# Patient Record
Sex: FEMALE | Race: WHITE | Employment: FULL TIME | URBAN - METROPOLITAN AREA
[De-identification: names, ages, dates, MRNs, and addresses within clinical notes are randomized per-mention and may not be internally consistent; named-entity substitution may affect disease eponyms.]

---

## 2017-01-03 ENCOUNTER — OFFICE VISIT (OUTPATIENT)
Dept: INTERNAL MEDICINE CLINIC | Age: 52
End: 2017-01-03

## 2017-01-03 VITALS
HEIGHT: 67 IN | DIASTOLIC BLOOD PRESSURE: 98 MMHG | BODY MASS INDEX: 35.06 KG/M2 | OXYGEN SATURATION: 99 % | WEIGHT: 223.4 LBS | RESPIRATION RATE: 20 BRPM | HEART RATE: 98 BPM | SYSTOLIC BLOOD PRESSURE: 132 MMHG

## 2017-01-03 DIAGNOSIS — R23.2 HOT FLASHES: ICD-10-CM

## 2017-01-03 DIAGNOSIS — F98.8 ADD (ATTENTION DEFICIT DISORDER): ICD-10-CM

## 2017-01-03 DIAGNOSIS — R13.11 ORAL PHASE DYSPHAGIA: ICD-10-CM

## 2017-01-03 DIAGNOSIS — J40 BRONCHITIS: ICD-10-CM

## 2017-01-03 DIAGNOSIS — J02.9 SORE THROAT: Primary | ICD-10-CM

## 2017-01-03 DIAGNOSIS — E03.9 ACQUIRED HYPOTHYROIDISM: ICD-10-CM

## 2017-01-03 DIAGNOSIS — F41.8 DEPRESSION WITH ANXIETY: ICD-10-CM

## 2017-01-03 RX ORDER — VENLAFAXINE 37.5 MG/1
37.5 TABLET ORAL 2 TIMES DAILY
Qty: 60 TAB | Refills: 0 | Status: SHIPPED | OUTPATIENT
Start: 2017-01-03 | End: 2017-01-26 | Stop reason: SDUPTHER

## 2017-01-03 RX ORDER — AZITHROMYCIN 250 MG/1
250 TABLET, FILM COATED ORAL SEE ADMIN INSTRUCTIONS
Qty: 6 TAB | Refills: 0 | Status: SHIPPED | OUTPATIENT
Start: 2017-01-03 | End: 2017-01-08

## 2017-01-03 NOTE — PROGRESS NOTES
1. Have you been to the ER, urgent care clinic since your last visit? Hospitalized since your last visit?no    2. Have you seen or consulted any other health care providers outside of the [facility] since your last visit? Include any pap smears or colon screening. No  Chief Complaint   Patient presents with    Sore Throat     sore throat x4 days with some nasal congestion and sinus pressure.

## 2017-01-03 NOTE — MR AVS SNAPSHOT
Visit Information Date & Time Provider Department Dept. Phone Encounter #  
 1/3/2017 11:30 AM Marcela Prescott, [address] [facility],[address] Internal Medicine [phone] [id number] Your Appointments 1/10/2017 10:00 AM  
New Patient with Juan Luis John PsyD Neurology [address] [place] [address]) Appt Note: NP. ..consult. ... ADD. ..lws; May be RTA eligible.; NP. ..consult. ... ADD. Denise Hurtado [facility]; NP. ..consult. .. Denise Hurtado ADD/ ajn; . [place] [address] [facility] [address] [place] [id number] [phone]  
  
   
 [place] [address] [facility] [address] [id number] [address] [place] Upcoming Health Maintenance Date Due DTaP/Tdap/Td series (1 - Tdap) 3/7/1988 PAP AKA CERVICAL CYTOLOGY 3/7/1988 Allergies as of 1/3/2017  Review Complete On: 1/3/2017 By: Marcela Prescott MD  
  
 Severity Noted Reaction Type Reactions Tylenol-codeine #3 [Acetaminophen-codeine]  01/13/2016    Unknown (comments) Takes hydrocodone at home. Has used oxycodone at well Current Immunizations  Never Reviewed No immunizations on file. Not reviewed this visit You Were Diagnosed With   
  
 Codes Comments Sore throat    -  Primary ICD-10-CM: J02.9 ICD-9-CM: 344 Oral phase dysphagia     ICD-10-CM: R13.11 ICD-9-CM: 787.21   
 ADD (attention deficit disorder)     ICD-10-CM: F98.8 ICD-9-CM: 314.00 Acquired hypothyroidism     ICD-10-CM: E03.9 ICD-9-CM: 244.9 Bronchitis     ICD-10-CM: J40 ICD-9-CM: 411 Hot flashes     ICD-10-CM: R23.2 ICD-9-CM: 782.62 Depression with anxiety     ICD-10-CM: F41.8 ICD-9-CM: 300.4 Vitals BP Pulse Resp Height(growth percentile) Weight(growth percentile) SpO2  
 (!) 132/98 (BP 1 Location: Left arm, BP Patient Position: Sitting) 98 20 5' 7\" (1.702 m) 223 lb 6.4 oz (101.3 kg) 99% BMI OB Status Smoking Status 34.99 kg/m2 Menopause Former Smoker BMI and BSA Data Body Mass Index Body Surface Area  34.99 kg/m 2 2.19 m 2  
  
 Preferred Pharmacy Pharmacy Name Phone [address] Your Updated Medication List  
  
   
This list is accurate as of: 1/3/17 12:34 PM.  Always use your most recent med list.  
  
  
  
  
 azithromycin 250 mg tablet Commonly known as:  Leanne Pickup Take 1 Tab by mouth See Admin Instructions for 5 days. dextroamphetamine-amphetamine 20 mg tablet Commonly known as:  ADDERALL Take 1 Tab by mouth two (2) times a day for 30 days. Max Daily Amount: 40 mg.  
  
 levothyroxine 75 mcg tablet Commonly known as:  SYNTHROID Take 1 Tab by mouth Daily (before breakfast). traMADol 50 mg tablet Commonly known as:  ULTRAM  
Take 100 mg by mouth every six (6) hours as needed for Pain. venlafaxine 37.5 mg tablet Commonly known as:  [facility] Take 1 Tab by mouth two (2) times a day for 30 days. Prescriptions Sent to Pharmacy Refills  
 azithromycin (ZITHROMAX) 250 mg tablet 0 Sig: Take 1 Tab by mouth See Admin Instructions for 5 days. Class: Normal  
 Pharmacy: [address] [place] [address] Ph #: [phone] Route: Oral  
 venlafaxine (EFFEXOR) 37.5 mg tablet 0 Sig: Take 1 Tab by mouth two (2) times a day for 30 days. Class: Normal  
 Pharmacy: [address] [place] [address] Ph #: [phone] Route: Oral  
  
Referral Information Referral ID Referred By Referred To  
  
 [id number] [facility] [address] [place] [address] [phone] [address] [place] [address] Visits Status Start Date End Date 1 New Request 1/3/17 1/3/18 If your referral has a status of pending review or denied, additional information will be sent to support the outcome of this decision. [facility]! Dear BODØ: Thank you for requesting a [employer] account. Our records indicate that you already have an active [employer] account. You can access your account anytime at [email or web address] Did you know that you can access your hospital and ER discharge instructions at any time in [employer]? You can also review all of your test results from your hospital stay or ER visit. Additional Information If you have questions, please visit the Frequently Asked Questions section of the [employer] website at [email or web address] Remember, [employer] is NOT to be used for urgent needs. For medical emergencies, dial 911. Now available from your iPhone and Android! Please provide this summary of care documentation to your next provider. Your primary care clinician is listed as Juan Bull. If you have any questions after today's visit, please call [phone].

## 2017-01-03 NOTE — PROGRESS NOTES
Written by Lorne Bueno, as dictated by Dr. Cary Corona MD.    Melvina Garcia is a 54 y.o. female. HPI  The patient comes in today C/O sore throat x 3 days. She has started coughing today and she has post nasal drip. She took mucinex x 2 days which has helped with her throat pain. She has been having trouble swallowing and she sometimes will vomit because the food will get stuck. This occurs 3-4 times a day. Her hair has been falling out and she has also been struggling with depression and not wanting to spend time with anyone. She has not had a period in 18 months and she has very bad hot flashes. She had her flu shot in November. 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 Max Daily Amount: 40 mg. 60 Tab 0    levothyroxine (SYNTHROID) 75 mcg tablet Take 1 Tab by mouth Daily (before breakfast). 90 Tab 1    traMADol (ULTRAM) 50 mg tablet Take 100 mg by mouth every six (6) hours as needed for Pain. No current facility-administered medications on file prior to visit. Allergies   Allergen Reactions    Tylenol-Codeine #3 [Acetaminophen-Codeine] Unknown (comments)     Takes hydrocodone at home.  Has used oxycodone at well       Past Medical History   Diagnosis Date    Arthritis     Colitis     [facility] [address] [address]     Ill-defined condition      ADHD    Ill-defined condition      Hep C    Thyroid disease     Ulcerative colitis ([facility] [address]        Past Surgical History   Procedure Laterality Date    Hx tonsillectomy  1988    Hx knee arthroscopy       x3- last done 9/2015 to harvest cartilage    Hx orthopaedic Left 1/14/2016     articular cartilage transplant       Family History   Problem Relation Age of Onset    Cancer Mother      melanoma    Hypertension Father     Cancer Father      melanoma    Arthritis-osteo Father     No Known Problems Sister        Social History     Social History    Marital status:      Spouse name: N/A    Number of children: N/A    Years of education: N/A     Occupational History    Not on file. Social History Main Topics    Smoking status: Former Smoker     Quit date: 11/25/2014    Smokeless tobacco: Current User      Comment: uses e cigarettes    Alcohol use No    Drug use: No    Sexual activity: No     Other Topics Concern    Not on file     Social History Narrative           Review of Systems   Constitutional: Negative for malaise/fatigue. HENT: Positive for sore throat. Negative for congestion. Respiratory: Positive for cough. Negative for wheezing. Cardiovascular: Negative for chest pain and palpitations. Musculoskeletal: Negative for joint pain and myalgias. Neurological: Negative for weakness and headaches. Psychiatric/Behavioral: Positive for depression. Negative for suicidal ideas. The patient is nervous/anxious. Visit Vitals    BP (!) 132/98 (BP 1 Location: Left arm, BP Patient Position: Sitting)    Pulse 98    Resp 20    Ht 5' 7\" (1.702 m)    Wt 223 lb 6.4 oz (101.3 kg)    SpO2 99%    BMI 34.99 kg/m2     Physical Exam   Constitutional: She is oriented to person, place, and time. She appears well-nourished. No distress. HENT:   Right Ear: External ear normal.   Left Ear: External ear normal.   Mouth/Throat: Posterior oropharyngeal edema present.    R ear erythema   Eyes: Conjunctivae and EOM are normal. Right eye exhibits no discharge. Left eye exhibits no discharge. Neck: Normal range of motion. Neck supple. Cardiovascular: Normal rate and regular rhythm. Pulmonary/Chest: Effort normal and breath sounds normal. She has no wheezes. L sided coarse breath sounds    Abdominal: Soft. Bowel sounds are normal. She exhibits no distension. Lymphadenopathy:     She has no cervical adenopathy. Neurological: She is alert and oriented to person, place, and time. Skin: Skin is intact. Psychiatric: She has a normal mood and affect. Nursing note and vitals reviewed. ASSESSMENT and PLAN    ICD-10-CM ICD-9-CM    1. Sore throat J02.9 462 I want her to do salt water gargles 4-5 times a day for 3-4 days. 2. Oral phase dysphagia R13.11 787.21 REFERRAL TO GASTROENTEROLOGY    I will send her to a gastroenterologist for an endoscopy. 3. ADD (attention deficit disorder) F98.8 314.00 She has an appointment for testing in February. 4. Acquired hypothyroidism E03.9 244.9 TSH 3RD GENERATION    I will check her levels today. LIPID PANEL   5. Bronchitis J40 490 azithromycin (ZITHROMAX) 250 mg tablet sent to pharmacy. I discussed that I will start her on Z-pack. 6. Hot flashes R23.2 782.62 venlafaxine (EFFEXOR) 37.5 mg tablet sent to pharmacy. 7. Depression with anxiety F41.8 300.4 venlafaxine (EFFEXOR) 37.5 mg tablet sent to pharmacy. I am going to start her on Effexor once she stops the Z-pack for hot flashes and I want to see her back in 4 weeks after she has been on the medication and this will help her with her anxiety as well. This plan was reviewed with the patient and patient agrees. All questions were answered. This scribe documentation was reviewed by me and accurately reflects the examination and decisions made by me. This note will not be viewable in [address]

## 2017-01-04 LAB
CHOLEST SERPL-MCNC: 185 MG/DL (ref 100–199)
HDLC SERPL-MCNC: 44 MG/DL
INTERPRETATION, 910389: NORMAL
LDLC SERPL CALC-MCNC: 118 MG/DL (ref 0–99)
TRIGL SERPL-MCNC: 113 MG/DL (ref 0–149)
TSH SERPL DL<=0.005 MIU/L-ACNC: 1.71 UIU/ML (ref 0.45–4.5)
VLDLC SERPL CALC-MCNC: 23 MG/DL (ref 5–40)

## 2017-01-04 NOTE — PROGRESS NOTES
Let her know TSH came back normal. Should continue same dose synthroid. LDL slightly elevated ,need exercise & low fat diet.

## 2017-01-15 DIAGNOSIS — F90.0 ATTENTION DEFICIT HYPERACTIVITY DISORDER (ADHD), PREDOMINANTLY INATTENTIVE TYPE: ICD-10-CM

## 2017-01-17 DIAGNOSIS — E03.9 ACQUIRED HYPOTHYROIDISM: ICD-10-CM

## 2017-01-17 RX ORDER — LEVOTHYROXINE SODIUM 75 UG/1
75 TABLET ORAL
Qty: 90 TAB | Refills: 1 | Status: SHIPPED | OUTPATIENT
Start: 2017-01-17 | End: 2017-11-30 | Stop reason: DRUGHIGH

## 2017-01-17 NOTE — TELEPHONE ENCOUNTER
From: Héctor Cardoza  To: Lauren Acevedo NP  Sent: 1/17/2017 10:22 AM EST  Subject: Medication Renewal Request    Original authorizing provider: PETER Laguna would like a refill of the following medications:  levothyroxine (SYNTHROID) 75 mcg tablet Lauren Acevedo NP]    Preferred pharmacy: [address] [place], [place] [address]    Comment:

## 2017-01-17 NOTE — TELEPHONE ENCOUNTER
From: Mechelle Genao  To: Rebecca Friend NP  Sent: 1/15/2017 2:11 PM EST  Subject: Medication Renewal Request    Original authorizing provider: PETER Perez would like a refill of the following medications:  dextroamphetamine-amphetamine (ADDERALL) 20 mg tablet Rebecca Freind NP]    Preferred pharmacy: [facility] [place]    Comment:

## 2017-01-18 RX ORDER — DEXTROAMPHETAMINE SACCHARATE, AMPHETAMINE ASPARTATE, DEXTROAMPHETAMINE SULFATE AND AMPHETAMINE SULFATE 5; 5; 5; 5 MG/1; MG/1; MG/1; MG/1
20 TABLET ORAL 2 TIMES DAILY
Qty: 60 TAB | Refills: 0 | Status: SHIPPED | OUTPATIENT
Start: 2017-01-18 | End: 2017-01-26 | Stop reason: SDUPTHER

## 2017-01-26 ENCOUNTER — OFFICE VISIT (OUTPATIENT)
Dept: INTERNAL MEDICINE CLINIC | Age: 52
End: 2017-01-26

## 2017-01-26 VITALS
HEART RATE: 106 BPM | TEMPERATURE: 99.6 F | SYSTOLIC BLOOD PRESSURE: 126 MMHG | HEIGHT: 67 IN | OXYGEN SATURATION: 98 % | DIASTOLIC BLOOD PRESSURE: 78 MMHG | BODY MASS INDEX: 34.34 KG/M2 | WEIGHT: 218.8 LBS | RESPIRATION RATE: 16 BRPM

## 2017-01-26 DIAGNOSIS — F51.01 PRIMARY INSOMNIA: ICD-10-CM

## 2017-01-26 DIAGNOSIS — G25.81 RESTLESS LEG SYNDROME: ICD-10-CM

## 2017-01-26 DIAGNOSIS — F41.8 DEPRESSION WITH ANXIETY: ICD-10-CM

## 2017-01-26 DIAGNOSIS — F90.0 ATTENTION DEFICIT HYPERACTIVITY DISORDER (ADHD), PREDOMINANTLY INATTENTIVE TYPE: Primary | ICD-10-CM

## 2017-01-26 RX ORDER — TRAZODONE HYDROCHLORIDE 100 MG/1
TABLET ORAL
Qty: 90 TAB | Refills: 2 | Status: SHIPPED | OUTPATIENT
Start: 2017-01-26 | End: 2017-07-07 | Stop reason: ALTCHOICE

## 2017-01-26 RX ORDER — GABAPENTIN 300 MG/1
600 CAPSULE ORAL
Qty: 60 CAP | Refills: 2 | Status: SHIPPED | OUTPATIENT
Start: 2017-01-26 | End: 2017-01-26 | Stop reason: SDUPTHER

## 2017-01-26 RX ORDER — TRAZODONE HYDROCHLORIDE 100 MG/1
100 TABLET ORAL
Qty: 30 TAB | Refills: 2 | Status: SHIPPED | OUTPATIENT
Start: 2017-01-26 | End: 2017-01-26 | Stop reason: SDUPTHER

## 2017-01-26 RX ORDER — DEXTROAMPHETAMINE SACCHARATE, AMPHETAMINE ASPARTATE, DEXTROAMPHETAMINE SULFATE AND AMPHETAMINE SULFATE 5; 5; 5; 5 MG/1; MG/1; MG/1; MG/1
20 TABLET ORAL 2 TIMES DAILY
Qty: 60 TAB | Refills: 0 | Status: SHIPPED | OUTPATIENT
Start: 2017-03-18 | End: 2017-04-17

## 2017-01-26 RX ORDER — DEXTROAMPHETAMINE SACCHARATE, AMPHETAMINE ASPARTATE, DEXTROAMPHETAMINE SULFATE AND AMPHETAMINE SULFATE 5; 5; 5; 5 MG/1; MG/1; MG/1; MG/1
20 TABLET ORAL 2 TIMES DAILY
Qty: 60 TAB | Refills: 0 | Status: SHIPPED | OUTPATIENT
Start: 2017-04-17 | End: 2017-05-17

## 2017-01-26 RX ORDER — GABAPENTIN 300 MG/1
CAPSULE ORAL
Qty: 180 CAP | Refills: 2 | Status: SHIPPED | OUTPATIENT
Start: 2017-01-26 | End: 2017-05-22 | Stop reason: DRUGHIGH

## 2017-01-26 RX ORDER — DEXTROAMPHETAMINE SACCHARATE, AMPHETAMINE ASPARTATE, DEXTROAMPHETAMINE SULFATE AND AMPHETAMINE SULFATE 5; 5; 5; 5 MG/1; MG/1; MG/1; MG/1
20 TABLET ORAL 2 TIMES DAILY
Qty: 60 TAB | Refills: 0 | Status: SHIPPED | OUTPATIENT
Start: 2017-02-16 | End: 2017-05-22 | Stop reason: SDUPTHER

## 2017-01-26 NOTE — PROGRESS NOTES
HISTORY OF PRESENT ILLNESS  Ramya Ugarte is a 54 y.o. female here to discuss her blood pressure and restless leg syndrome. 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 Has used oxycodone at well     Current Outpatient Prescriptions on File Prior to Visit   Medication Sig Dispense Refill    venlafaxine (EFFEXOR) 37.5 mg tablet Take 1 Tab by mouth two (2) times a day for 30 days. 60 Tab 0    levothyroxine (SYNTHROID) 75 mcg tablet Take 1 Tab by mouth Daily (before breakfast). 90 Tab 1    traMADol (ULTRAM) 50 mg tablet Take 100 mg by mouth every six (6) hours as needed for Pain. No current facility-administered medications on file prior to visit. Past Medical History   Diagnosis Date    Arthritis     Colitis     [facility] ulcerative ([facility] [address]     Ill-defined condition      ADHD    Ill-defined condition      Hep C    Thyroid disease     Ulcerative colitis ([facility] [address]      Past Surgical History   Procedure Laterality Date    Hx tonsillectomy  1988    Hx knee arthroscopy       x3- last done 9/2015 to harvest cartilage    Hx orthopaedic Left 1/14/2016     articular cartilage transplant     Social History     Social History    Marital status:      Spouse name: N/A    Number of children: N/A    Years of education: N/A     Occupational History    Not on file.      Social History Main Topics    Smoking status: Former Smoker     Quit date: 11/25/2014    Smokeless tobacco: Current User      Comment: uses e cigarettes    Alcohol use No    Drug use: No    Sexual activity: No     Other Topics Concern    Not on file     Social History Narrative     Family History   Problem Relation Age of Onset    Cancer Mother      melanoma    Hypertension Father     Cancer Father      melanoma    Arthritis-osteo Father     No Known Problems Sister      This note will not be viewable in [address] If Narcotics or controlled substances were prescribed, I personally reviewed the patient  history. HPI   Patient with chronic history of left leg pain reports that she has been seeing ortho who tells her she has restless leg syndrome which she reports has worsened over the past few months since she decided to come off of the chronic tramadol treatment of the leg pain. She feels that she has to move her legs all night long and has even had to get up and walk to relieve the symptoms. She denies having this problem previously as she would take a Tramadol \"or two\" before bed and she would sleep \"ok\"   She is concerned about her blood pressure that was elevated at two different appointments she has had in the past few weeks. She does have a history of depression/anxiety and she is unsure if this is related. She denies personal history of hypertension. She is having insomnia regularly and would like to take Trazodone until she gets the restless leg syndrome under control. She was on trazodone \"years ago\" and it worked for her. She also would like to schedule every 3 months for the adderall refills instead of having to come to the office monthly to receive her Rx. She will make quarterly appointments and we will provide the 3 months at a time for this. She is slightly tachycardic but she also admits she is anxious as well. Otherwise, no acute abnormalities are noted. Review of Systems   Constitutional: Negative for chills, fever, malaise/fatigue and weight loss. HENT: Negative for congestion, ear pain and sore throat.     Eyes: Negative for blurred vision and double vision. Respiratory: Negative for cough and shortness of breath. Cardiovascular: Negative for chest pain, palpitations and leg swelling. Gastrointestinal: Negative for abdominal pain, constipation, diarrhea, nausea and vomiting. Genitourinary: Negative for dysuria, frequency and urgency. Musculoskeletal: Negative for back pain, joint pain, myalgias and neck pain. Restless leg syndrome bilaterally     Skin: Negative for rash. Neurological: Negative for dizziness, tingling, sensory change, speech change, focal weakness and headaches. Psychiatric/Behavioral: Positive for depression. The patient is nervous/anxious and has insomnia. Physical Exam   Constitutional: She is oriented to person, place, and time. She appears well-developed. She is cooperative. She does not appear ill. No distress. Patient obese. Patient tachycardic. Patient anxious. Neck: Normal range of motion. Cardiovascular: Regular rhythm and normal heart sounds. Tachycardia present. Pulmonary/Chest: Effort normal and breath sounds normal. No respiratory distress. Musculoskeletal: Normal range of motion. She exhibits no edema. Neurological: She is alert and oriented to person, place, and time. She has normal strength. No cranial nerve deficit or sensory deficit. Gait normal.   Skin: Skin is warm and dry. She is not diaphoretic. Psychiatric: Her behavior is normal. Her mood appears anxious. Nursing note and vitals reviewed. ASSESSMENT and PLAN    ICD-10-CM ICD-9-CM    1. Attention deficit hyperactivity disorder (ADHD), predominantly inattentive type F90.0 314.00 dextroamphetamine-amphetamine (ADDERALL) 20 mg tablet      dextroamphetamine-amphetamine (ADDERALL) 20 mg tablet      dextroamphetamine-amphetamine (ADDERALL) 20 mg tablet   2. Restless leg syndrome G25.81 333.94 DISCONTINUED: gabapentin (NEURONTIN) 300 mg capsule   3.  Primary insomnia F51.01 307.42 DISCONTINUED: traZODone (DESYREL) 100 mg tablet   4. Depression with anxiety F41.8 300.4    Refill of adderall provided for 3 months (post-dated). Medication benefits, risks, indication, dosage, potential side effects and alternate medication options were discussed with patient who expressed understanding. Encouraged patient to continue current treatment plan as she has been doing well with this. Follow up in 3 months for routine visit and refills. I started the patient on Neurontin for the restless leg syndrome and gave trazodone refill to use until the medication is being effective. Medication benefits, risks, indication, dosage, potential side effects and alternate medication options were discussed with patient who expressed understanding. Return to office sooner if needed for new concerns or symptoms. Patient expressed understanding of instructions and agreement with treatment plan.

## 2017-01-26 NOTE — PROGRESS NOTES
Chief Complaint   Patient presents with    Blood Pressure Check     states that she went to doctor orthopedic and blood pressure, states it was elevated so she bought a cuff to keep track and is having a lot of elevated . with bottom number coming in 111 and 170 for top.  having blurred vision and seeing spots. can feel heart racing.  Leg Pain     has had three surgeries on left leg and ok during the day but after getting off from work cant sleep because the leg is in pain and feels like she has to move the leg to get it to feel better.

## 2017-01-26 NOTE — PATIENT INSTRUCTIONS
Thank you for choosing [facility]. We know you have many options when it comes to your healthcare; we appreciate that you picked us. Our goal is to provide exceptional  service and world class care for every patient. You may receive a survey in the mail or by email asking for your feedback. Please take a few minutes to share your thoughts about your recent visit. Your comments helps us understand what we do well and what we can do better. To ensure confidentiality, this survey is administered by an independent third-party, [employer] [place] participation will help us to improve the quality of care that we provide to you, your family, friends, and neighbors. Thank you! Learning About Anxiety Disorders  What are anxiety disorders? Anxiety disorders are a type of medical problem. They cause severe anxiety. When you feel anxious, you feel that something bad is about to happen. This feeling interferes with your life. These disorders include:  · Generalized anxiety disorder. You feel worried and stressed about many everyday events and activities. This goes on for several months and disrupts your life on most days. · Panic disorder. You have repeated panic attacks. A panic attack is a sudden, intense fear or anxiety. It may make you feel short of breath. Your heart may pound. · Social anxiety disorder. You feel very anxious about what you will say or do in front of people. For example, you may be scared to talk or eat in public. This problem affects your daily life. · Phobias. You are very scared of a specific object, situation, or activity. For example, you may fear spiders, high places, or small spaces. What are the symptoms? Generalized anxiety disorder  Symptoms may include:  · Feeling worried and stressed about many things almost every day. · Feeling tired or irritable. You may have a hard time concentrating. · Having headaches or muscle aches.   · Having a hard time swallowing. · Feeling shaky, sweating, or having hot flashes. Panic disorder  You may have repeated panic attacks when there is no reason for feeling afraid. You may change your daily activities because you worry that you will have another attack. Symptoms may include:  · Intense fear, terror, or anxiety. · Trouble breathing or very fast breathing. · Chest pain or tightness. · A heartbeat that races or is not regular. Social anxiety disorder  Symptoms may include:  · Fear about a social situation, such as eating in front of others or speaking in public. You may worry a lot. Or you may be afraid that something bad will happen. · Anxiety that can cause you to blush, sweat, and feel shaky. · A heartbeat that is faster than normal.  · A hard time focusing. Phobias  Symptoms may include:  · More fear than most people of being around an object, being in a situation, or doing an activity. You might also be stressed about the chance of being around the thing you fear. · Worry about losing control, panicking, fainting, or having physical symptoms like a faster heartbeat when you are around the situation or object. How are these disorders treated? Anxiety disorders can be treated with medicines or counseling. A combination of both may be used. Medicines may include:  · Antidepressants. These may help your symptoms by keeping chemicals in your brain in balance. · Benzodiazepines. These may give you short-term relief of your symptoms. Some people use cognitive-behavioral therapy. A therapist helps you learn to change stressful or bad thoughts into helpful thoughts. Lead a healthy lifestyle  A healthy lifestyle may help you feel better. · Get at least 30 minutes of exercise on most days of the week. Walking is a good choice. · Eat a healthy diet. Include fruits, vegetables, lean proteins, and whole grains in your diet each day. · Try to go to bed at the same time every night.  Try for 8 hours of sleep a night. · Find ways to manage stress. Try relaxation exercises. · Avoid alcohol and illegal drugs. Follow-up care is a key part of your treatment and safety. Be sure to make and go to all appointments, and call your doctor if you are having problems. It's also a good idea to know your test results and keep a list of the medicines you take. Where can you learn more? Go to http://rosales-gorge.info/. Enter E622 in the search box to learn more about \"Learning About Anxiety Disorders. \"  Current as of: July 26, 2016  Content Version: 11.1  © 4770-8897 [employer]. Care instructions adapted under license by [employer] (which disclaims liability or warranty for this information). If you have questions about a medical condition or this instruction, always ask your healthcare professional. [address] any warranty or liability for your use of this information. Learning About Attention Deficit Hyperactivity Disorder (ADHD) in Adults  What is ADHD? Attention deficit hyperactivity disorder (ADHD) is a condition in which people have a hard time paying attention. Adults with ADHD also may be more active than normal. They tend to act without thinking. ADHD may make it harder for them to focus, get organized, and finish tasks. ADHD most often starts in childhood and lasts into adulthood. Many adults don't know that they have ADHD until their children are diagnosed. Then they begin to see their own symptoms. Doctors don't know what causes ADHD. But it tends to run in families. What are the symptoms? The most common types of ADHD symptoms in adults are attention problems and hyperactivity. Attention problems  Adults with ADHD often find it hard to:  · Finish tasks that don't interest them or aren't easy. But they may become obsessed with activities that they find interesting and enjoy. · Keep relationships.   · Focus their attention on conversations, reading materials, or jobs. They may change jobs a lot. · Remember things. They may misplace or lose things. · Pay attention. They are easily distracted. They find it hard to focus on one task. · Think before they act. They may make quick decisions. They may act before they think about the effect of their actions. Hyperactivity  Adults with ADHD may:  · Fidget. They may swing their legs, shift in their seats, or tap their fingers. · Move around a lot. They may feel \"revved up\" or on the go. They may not be able to slow down until they are very tired. · Find it hard to relax. They may feel restless and find it hard to do quiet things like read or watch TV. How does ADHD affect daily life? ADHD in adults may affect:  · Job performance. They may find it hard to organize their work, manage their time, and focus on one task at a time. They may forget, misplace, or lose things. They may quit their jobs out of boredom. · Relationships. Adults with ADHD may find it hard to focus their attention on conversations. It is hard for them to \"read\" the behavior and moods of others and express their own feelings. · Temper. They may get easily frustrated. This often can make it harder for them to deal with stress. These adults may overreact and have a short, quick temper. · The ability to solve problems. Adults who have a hard time waiting for things they want may act before they think about the effect of their actions. They may take part in risky behaviors. These include unprotected sex, unsafe driving, alcohol and drug use, or unwise business ventures. How is ADHD treated? ADHD can be treated with medicines, behavior training, or counseling. Or it may be a combination of these treatments. Medicines  Stimulant medicines are most often used to treat ADHD. These may include:  · Amphetamines (such as Adderall and Dexedrine).   · Methylphenidate (such as Concerta, Daytrana, Focalin, Metadate, and Ritalin). Other medicines that may be used are:  · Atomoxetine, such as Strattera, a nonstimulant medicine for ADHD. · Antihypertensives. These include clonidine (such as Catapres) and guanfacine (such as Tenex). · Antidepressants, which include bupropion (Wellbutrin). Behavior training  Behavior training can help adults with ADHD learn how to:  · Get organized. A daily organizer or planner can help these adults organize their daily tasks. They can write down appointments and other things they need to remember. · Decrease distractions. They can set up their work or home environment so that there are fewer things that will distract them. They may find using headphones or a \"white noise\" machine helpful. College students can arrange a quiet living situation. They may need a single dorm room. · Work on relationships. Social skills training can help adults with ADHD relate to family, friends, and coworkers. Couples counseling or family therapy can also help improve relationships. Counseling  Counseling is not meant to treat inattention, hyperactivity, or impulsiveness. But it can help with some of the problems that go along with ADHD. These include not getting along well with others and having problems following rules. Where can you learn more? Go to http://rosales-gorge.info/. Enter T928 in the search box to learn more about \"Learning About Attention Deficit Hyperactivity Disorder (ADHD) in Adults. \"  Current as of: July 26, 2016  Content Version: 11.1  © 7375-4173 [employer]. Care instructions adapted under license by [employer] (which disclaims liability or warranty for this information). If you have questions about a medical condition or this instruction, always ask your healthcare professional. [place] [address] any warranty or liability for your use of this information.        Attention Deficit Hyperactivity Disorder (ADHD) in Adults: Care Instructions  Your Care Instructions  Attention deficit hyperactivity disorder, or ADHD, is a condition that makes it hard to pay attention. So you may have problems when you try to focus, get organized, and finish tasks. It might make you more active than other people. Or you might do things without thinking first.  ADHD is very common. It usually starts in early childhood. Many adults don't realize they have it until their children are diagnosed. Then they become aware of their own symptoms. Doctors don't know what causes ADHD. But it often runs in families. ADHD can be treated with medicines, behavior training, and counseling. Treatment can improve your life. Follow-up care is a key part of your treatment and safety. Be sure to make and go to all appointments, and call your doctor if you are having problems. It's also a good idea to know your test results and keep a list of the medicines you take. How can you care for yourself at home? · Learn all you can about ADHD. This will help you and your family understand it better. · Take your medicines exactly as prescribed. Call your doctor if you think you are having a problem with your medicine. You will get more details on the specific medicines your doctor prescribes. · If you miss a dose of your medicine, do not take an extra dose. · If your doctor suggests counseling, find a counselor you like and trust. Talk openly and honestly. Be willing to make some changes. · Find a support group for adults with ADHD. Talking to others with the same problems can help you feel better. It can also give you ideas about how to best cope with the condition. · Get rid of distractions at your work space. Keep your desk clean. Try not to face a window or busy hallway. · Use files, planners, and other tools to keep you organized. · Limit use of alcohol, and do not use illegal drugs. People with ADHD tend to become addicted more easily than others.  Tell your doctor if you need help to quit. Counseling, support groups, and sometimes medicines can help you stay free of alcohol or drugs. · Get at least 30 minutes of physical activity on most days of the week. Exercise has been shown to help people cope with ADHD. Walking is a good choice. You also may want to do other activities, such as running, swimming, cycling, or playing tennis or team sports. When should you call for help? Watch closely for changes in your health, and be sure to contact your doctor if:  · You feel sad a lot or cry all the time. · You have trouble sleeping, or you sleep too much. · You find it hard to concentrate, make decisions, or remember things. · You change how you normally eat. · You feel guilty for no reason. Where can you learn more? Go to http://rosales-gorge.info/. Enter B196 in the search box to learn more about \"Attention Deficit Hyperactivity Disorder (ADHD) in Adults: Care Instructions. \"  Current as of: July 26, 2016  Content Version: 11.1  © 6927-2833 [employer]. Care instructions adapted under license by [employer] (which disclaims liability or warranty for this information). If you have questions about a medical condition or this instruction, always ask your healthcare professional. [address] any warranty or liability for your use of this information. Insomnia: Care Instructions  Your Care Instructions  Insomnia is the inability to sleep well. It is a common problem for most people at some time. Insomnia may make it hard for you to get to sleep, stay asleep, or sleep as long as you need to. This can make you tired and grouchy during the day. It can also make you forgetful, less effective at work, and unhappy. Insomnia can be caused by conditions such as depression or anxiety. Pain can also affect your ability to sleep. When these problems are solved, the insomnia usually clears up.  But sometimes bad sleep habits can cause insomnia. If insomnia is affecting your work or your enjoyment of life, you can take steps to improve your sleep. Follow-up care is a key part of your treatment and safety. Be sure to make and go to all appointments, and call your doctor if you are having problems. It's also a good idea to know your test results and keep a list of the medicines you take. How can you care for yourself at home? What to avoid  · Do not have drinks with caffeine, such as coffee or black tea, for 8 hours before bed. · Do not smoke or use other types of tobacco near bedtime. Nicotine is a stimulant and can keep you awake. · Avoid drinking alcohol late in the evening, because it can cause you to wake in the middle of the night. · Do not eat a big meal close to bedtime. If you are hungry, eat a light snack. · Do not drink a lot of water close to bedtime, because the need to urinate may wake you up during the night. · Do not read or watch TV in bed. Use the bed only for sleeping and sexual activity. What to try  · Go to bed at the same time every night, and wake up at the same time every morning. Do not take naps during the day. · Keep your bedroom quiet, dark, and cool. · Sleep on a comfortable pillow and mattress. · If watching the clock makes you anxious, turn it facing away from you so you cannot see the time. · If you worry when you lie down, start a worry book. Well before bedtime, write down your worries, and then set the book and your concerns aside. · Try meditation or other relaxation techniques before you go to bed. · If you cannot fall asleep, get up and go to another room until you feel sleepy. Do something relaxing. Repeat your bedtime routine before you go to bed again. · Make your house quiet and calm about an hour before bedtime. Turn down the lights, turn off the TV, log off the computer, and turn down the volume on music. This can help you relax after a busy day.   When should you call for help?  Watch closely for changes in your health, and be sure to contact your doctor if:  · Your efforts to improve your sleep do not work. · Your insomnia gets worse. · You have been feeling down, depressed, or hopeless or have lost interest in things that you usually enjoy. Where can you learn more? Go to http://rosales-gorge.info/. Enter P513 in the search box to learn more about \"Insomnia: Care Instructions. \"  Current as of: July 26, 2016  Content Version: 11.1  © 2216-9465 [employer]. Care instructions adapted under license by [employer] (which disclaims liability or warranty for this information). If you have questions about a medical condition or this instruction, always ask your healthcare professional. [address] any warranty or liability for your use of this information. Restless Legs Syndrome: Care Instructions  Your Care Instructions  Restless legs syndrome is a common nervous system problem. People with this syndrome feel a creeping, achy, or unpleasant feeling in the legs and an overpowering urge to move them. It often occurs in the evening and at night and can lead to sleep problems and tiredness. Your doctor may suggest doing a study of your sleep patterns to figure out what is happening when you try to sleep. Many people get relief from symptoms when they get regular exercise, eat well, and avoid caffeine, alcohol, and tobacco.  Follow-up care is a key part of your treatment and safety. Be sure to make and go to all appointments, and call your doctor if you are having problems. It's also a good idea to know your test results and keep a list of the medicines you take. How can you care for yourself at home? · Take your medicines exactly as prescribed. Call your doctor if you think you are having a problem with your medicine. · Try bathing in hot or cold water.  Applying a heating pad or ice bag to your legs may also help symptoms. · Stretch and massage your legs before bed or when discomfort begins. · Get some exercise for at least 30 minutes a day on most days of the week. Stop exercising at least 3 hours before bedtime. · Try to plan for situations where you will need to remain seated for long stretches. For example, if you are traveling by car, plan some stops so you can get out and walk around. · Tell your doctor about any medicines you are taking. This includes all over-the-counter, prescription, and herbal medicines. Some medicines, such as antidepressants, antihistamines, and cold and sinus medicines, can make your symptoms worse. · Avoid caffeine products, such as coffee, tea, cola, and chocolate. Caffeine can interrupt your sleep and stimulate you. · Do not smoke. Nicotine can make restless legs worse. If you need help quitting, talk to your doctor about stop-smoking programs and medicines. These can increase your chances of quitting for good. · Do not drink alcohol late in the evening. Take steps to help you sleep better  · Get plenty of sunlight in the outdoors, particularly later in the afternoon. · Use the evening hours for settling down. Avoid activities that challenge you in the hours before bedtime. · Eat meals at regular times, and do not snack before bedtime. · Keep your bedroom quiet, dark, and cool. Try using a sleep mask and earplugs to help you sleep. · Limit how much you drink at night to reduce your need to get up to urinate. But do not go to bed thirsty. · Run a fan or other steady \"white noise\" during the night if noises wake you up. · [place] the bed for sleeping and sex. Do your reading or TV watching in another room. · Once you are in bed, relax from head to toe, and guide your mind to pleasant thoughts. · Do not stay in bed longer than 8 hours, and try to avoid naps.   · If your symptoms usually improve around 4 a.m. to 6 a.m., try going to bed later than usual or allowing extra time for sleeping in to help you get the rest you need. When should you call for help? Watch closely for changes in your health, and be sure to contact your doctor if:  · You are still not getting enough sleep. · Your symptoms become more severe or happen more often. Where can you learn more? Go to http://rosales-gorge.info/. Enter M013 in the search box to learn more about \"Restless Legs Syndrome: Care Instructions. \"  Current as of: February 19, 2016  Content Version: 11.1  © 0406-8023 [employer]. Care instructions adapted under license by [employer] (which disclaims liability or warranty for this information). If you have questions about a medical condition or this instruction, always ask your healthcare professional. [address] any warranty or liability for your use of this information.

## 2017-02-21 DIAGNOSIS — R23.2 HOT FLASHES: ICD-10-CM

## 2017-02-21 DIAGNOSIS — F41.8 DEPRESSION WITH ANXIETY: ICD-10-CM

## 2017-02-22 RX ORDER — VENLAFAXINE 37.5 MG/1
TABLET ORAL
Qty: 60 TAB | Refills: 0 | Status: SHIPPED | OUTPATIENT
Start: 2017-02-22 | End: 2017-04-17 | Stop reason: SDUPTHER

## 2017-04-17 DIAGNOSIS — R23.2 HOT FLASHES: ICD-10-CM

## 2017-04-17 DIAGNOSIS — F41.8 DEPRESSION WITH ANXIETY: ICD-10-CM

## 2017-04-21 RX ORDER — VENLAFAXINE 37.5 MG/1
TABLET ORAL
Qty: 60 TAB | Refills: 0 | Status: SHIPPED | OUTPATIENT
Start: 2017-04-21 | End: 2017-04-25 | Stop reason: SDUPTHER

## 2017-04-25 ENCOUNTER — TELEPHONE (OUTPATIENT)
Dept: INTERNAL MEDICINE CLINIC | Age: 52
End: 2017-04-25

## 2017-04-25 DIAGNOSIS — F41.8 DEPRESSION WITH ANXIETY: ICD-10-CM

## 2017-04-25 DIAGNOSIS — R23.2 HOT FLASHES: ICD-10-CM

## 2017-04-25 RX ORDER — VENLAFAXINE 37.5 MG/1
TABLET ORAL
Qty: 180 TAB | Refills: 0 | Status: SHIPPED | OUTPATIENT
Start: 2017-04-25 | End: 2017-07-07 | Stop reason: ALTCHOICE

## 2017-04-25 NOTE — TELEPHONE ENCOUNTER
[facility] is calling requesting a 90 day supply of the [facility].  60 tabs were approved on 4/21

## 2017-05-22 ENCOUNTER — OFFICE VISIT (OUTPATIENT)
Dept: INTERNAL MEDICINE CLINIC | Age: 52
End: 2017-05-22

## 2017-05-22 VITALS
DIASTOLIC BLOOD PRESSURE: 86 MMHG | SYSTOLIC BLOOD PRESSURE: 158 MMHG | OXYGEN SATURATION: 98 % | HEART RATE: 102 BPM | TEMPERATURE: 98.9 F | RESPIRATION RATE: 16 BRPM | BODY MASS INDEX: 35.56 KG/M2 | HEIGHT: 67 IN | WEIGHT: 226.6 LBS

## 2017-05-22 DIAGNOSIS — F90.0 ATTENTION DEFICIT HYPERACTIVITY DISORDER (ADHD), PREDOMINANTLY INATTENTIVE TYPE: Primary | ICD-10-CM

## 2017-05-22 DIAGNOSIS — G25.81 RESTLESS LEG SYNDROME: ICD-10-CM

## 2017-05-22 DIAGNOSIS — Z72.0 TOBACCO ABUSE: ICD-10-CM

## 2017-05-22 RX ORDER — GABAPENTIN 400 MG/1
800 CAPSULE ORAL
Qty: 180 CAP | Refills: 2 | Status: SHIPPED | OUTPATIENT
Start: 2017-05-22 | End: 2017-10-16 | Stop reason: ALTCHOICE

## 2017-05-22 RX ORDER — DEXTROAMPHETAMINE SACCHARATE, AMPHETAMINE ASPARTATE, DEXTROAMPHETAMINE SULFATE AND AMPHETAMINE SULFATE 5; 5; 5; 5 MG/1; MG/1; MG/1; MG/1
20 TABLET ORAL 2 TIMES DAILY
Qty: 60 TAB | Refills: 0 | Status: SHIPPED | OUTPATIENT
Start: 2017-05-22 | End: 2017-06-21

## 2017-05-22 RX ORDER — VARENICLINE TARTRATE 25 MG
KIT ORAL
Qty: 1 DOSE PACK | Refills: 0 | Status: SHIPPED | OUTPATIENT
Start: 2017-05-22 | End: 2017-07-07 | Stop reason: ALTCHOICE

## 2017-05-22 RX ORDER — DEXTROAMPHETAMINE SACCHARATE, AMPHETAMINE ASPARTATE, DEXTROAMPHETAMINE SULFATE AND AMPHETAMINE SULFATE 5; 5; 5; 5 MG/1; MG/1; MG/1; MG/1
20 TABLET ORAL 2 TIMES DAILY
Qty: 60 TAB | Refills: 0 | Status: SHIPPED | OUTPATIENT
Start: 2017-07-21 | End: 2017-08-28 | Stop reason: SDUPTHER

## 2017-05-22 RX ORDER — DEXTROAMPHETAMINE SACCHARATE, AMPHETAMINE ASPARTATE, DEXTROAMPHETAMINE SULFATE AND AMPHETAMINE SULFATE 5; 5; 5; 5 MG/1; MG/1; MG/1; MG/1
20 TABLET ORAL 2 TIMES DAILY
Qty: 60 TAB | Refills: 0 | Status: SHIPPED | OUTPATIENT
Start: 2017-06-21 | End: 2017-07-21

## 2017-05-22 NOTE — PROGRESS NOTES
HISTORY OF PRESENT ILLNESS  Elian Chahal is a 50 y.o. female here for adderall follow up. 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 Has used oxycodone at well     Current Outpatient Prescriptions on File Prior to Visit   Medication Sig Dispense Refill    venlafaxine (EFFEXOR) 37.5 mg tablet TAKE 1 TABLET BY MOUTH TWICE DAILY 180 Tab 0    levothyroxine (SYNTHROID) 75 mcg tablet Take 1 Tab by mouth Daily (before breakfast). 90 Tab 1    traZODone (DESYREL) 100 mg tablet TAKE 1 TABLET BY MOUTH EVERY NIGHT 90 Tab 2    traMADol (ULTRAM) 50 mg tablet Take 100 mg by mouth every six (6) hours as needed for Pain. No current facility-administered medications on file prior to visit. Past Medical History:   Diagnosis Date    Arthritis     Colitis     Colitis, ulcerative ([address]     Ill-defined condition     ADHD    Ill-defined condition     Hep C    Thyroid disease     Ulcerative colitis ([facility] [address]      Past Surgical History:   Procedure Laterality Date    HX KNEE ARTHROSCOPY      x3- last done 9/2015 to harvest cartilage    HX ORTHOPAEDIC Left 1/14/2016    articular cartilage transplant    HX TONSILLECTOMY  1988     Social History     Social History    Marital status:      Spouse name: N/A    Number of children: N/A    Years of education: N/A     Occupational History    Not on file.      Social History Main Topics    Smoking status: Former Smoker     Quit date: 11/25/2014    Smokeless tobacco: Current User      Comment: uses e cigarettes  Alcohol use No    Drug use: No    Sexual activity: No     Other Topics Concern    Not on file     Social History Narrative     Family History   Problem Relation Age of Onset    Cancer Mother      melanoma    Hypertension Father     Cancer Father      melanoma    Arthritis-osteo Father     No Known Problems Sister      This note will not be viewable in [address] If Narcotics or controlled substances were prescribed, I personally reviewed the patient  history. NAYANA Pantoja is a 50 y.o. female who is here for 3 month adderall refill. Denies insomnia, weight loss, chest pain, shortness of breath, palpitations, dizziness or headaches. Patient also reports doing well on the current dose. Denies any additional concerns or symptoms at this time. Would like to continue on this current treatment plan. She also has been doing well on the neurontin and wants to continue at 800 mg nightly. Finally, she would like a refill of the Chantix starter pack she used last year when she quit smoking. She has relapsed and wants to quit \"for good\". Denies any additional acute symptoms or abnormality. Review of Systems   Constitutional: Negative for chills and fever. HENT: Negative for congestion, ear pain and sore throat. Eyes: Negative for blurred vision and double vision. Respiratory: Negative for cough and shortness of breath. Cardiovascular: Negative for chest pain and palpitations. Gastrointestinal: Negative for abdominal pain, diarrhea, nausea and vomiting. Musculoskeletal: Negative for back pain, myalgias and neck pain. Skin: Negative for rash. Neurological: Negative for dizziness, tingling and headaches. Psychiatric/Behavioral: The patient does not have insomnia. Physical Exam   Constitutional: She is oriented to person, place, and time. She appears well-developed. She is cooperative. She does not appear ill. No distress. Patient obese.   Patient slightly hypertensive but asymptomatic. Neck: Normal range of motion. Neck supple. Cardiovascular: Normal rate, regular rhythm and normal heart sounds. Pulmonary/Chest: Effort normal and breath sounds normal. No respiratory distress. Musculoskeletal: She exhibits no edema. Lymphadenopathy:     She has no cervical adenopathy. Neurological: She is alert and oriented to person, place, and time. Skin: Skin is warm and dry. She is not diaphoretic. Psychiatric: She has a normal mood and affect. Her behavior is normal.   Nursing note and vitals reviewed. ASSESSMENT and PLAN    ICD-10-CM ICD-9-CM    1. Attention deficit hyperactivity disorder (ADHD), predominantly inattentive type F90.0 314.00 dextroamphetamine-amphetamine (ADDERALL) 20 mg tablet   2. Restless leg syndrome G25.81 333.94 gabapentin (NEURONTIN) 400 mg capsule   3. Tobacco abuse Z72.0 305.1 varenicline (CHANTIX STARTER VINICIUS) 0.5 mg (11)- 1 mg (42) DsPk   Refill of adderall provided for 3 months (post-dated). Medication benefits, risks, indication, dosage, potential side effects and alternate medication options were discussed with patient who expressed understanding. Encouraged patient to continue current treatment plan as she has been doing well with this. Follow up in 3 months for routine visit and refills. Return to office sooner if needed for new concerns or symptoms. I refilled the neurontin at a higher dose (800 mg QHS) and also provided a refill of the Chantix for smoking cessation. Medication benefits, risks, indication, dosage, potential side effects and alternate medication options were discussed with patient who expressed understanding. Encouraged smoking cessation and diet/weight loss for aid in blood pressure control. Patient expressed understanding of instructions and agreement with treatment plan.

## 2017-05-22 NOTE — PROGRESS NOTES
Chief Complaint   Patient presents with    Medication Refill     adderall, and wants to talk about upping the gabapentin a little

## 2017-05-22 NOTE — PATIENT INSTRUCTIONS
Thank you for choosing [facility]. We know you have many options when it comes to your healthcare; we appreciate that you picked us. Our goal is to provide exceptional  service and world class care for every patient. You may receive a survey in the mail or by email asking for your feedback. Please take a few minutes to share your thoughts about your recent visit. Your comments helps us understand what we do well and what we can do better. To ensure confidentiality, this survey is administered by an independent third-party, [employer] [place] participation will help us to improve the quality of care that we provide to you, your family, friends, and neighbors. Thank you! Attention Deficit Hyperactivity Disorder (ADHD) in Adults: Care Instructions  Your Care Instructions  Attention deficit hyperactivity disorder, or ADHD, is a condition that makes it hard to pay attention. So you may have problems when you try to focus, get organized, and finish tasks. It might make you more active than other people. Or you might do things without thinking first.  ADHD is very common. It usually starts in early childhood. Many adults don't realize they have it until their children are diagnosed. Then they become aware of their own symptoms. Doctors don't know what causes ADHD. But it often runs in families. ADHD can be treated with medicines, behavior training, and counseling. Treatment can improve your life. Follow-up care is a key part of your treatment and safety. Be sure to make and go to all appointments, and call your doctor if you are having problems. It's also a good idea to know your test results and keep a list of the medicines you take. How can you care for yourself at home? · Learn all you can about ADHD. This will help you and your family understand it better. · Take your medicines exactly as prescribed.  Call your doctor if you think you are having a problem with your medicine. You will get more details on the specific medicines your doctor prescribes. · If you miss a dose of your medicine, do not take an extra dose. · If your doctor suggests counseling, find a counselor you like and trust. Talk openly and honestly. Be willing to make some changes. · Find a support group for adults with ADHD. Talking to others with the same problems can help you feel better. It can also give you ideas about how to best cope with the condition. · Get rid of distractions at your work space. Keep your desk clean. Try not to face a window or busy hallway. · Use files, planners, and other tools to keep you organized. · Limit use of alcohol, and do not use illegal drugs. People with ADHD tend to become addicted more easily than others. Tell your doctor if you need help to quit. Counseling, support groups, and sometimes medicines can help you stay free of alcohol or drugs. · Get at least 30 minutes of physical activity on most days of the week. Exercise has been shown to help people cope with ADHD. Walking is a good choice. You also may want to do other activities, such as running, swimming, cycling, or playing tennis or team sports. When should you call for help? Watch closely for changes in your health, and be sure to contact your doctor if:  · You feel sad a lot or cry all the time. · You have trouble sleeping, or you sleep too much. · You find it hard to concentrate, make decisions, or remember things. · You change how you normally eat. · You feel guilty for no reason. Where can you learn more? Go to http://rosales-gorge.info/. Enter B196 in the search box to learn more about \"Attention Deficit Hyperactivity Disorder (ADHD) in Adults: Care Instructions. \"  Current as of: July 26, 2016  Content Version: 11.2  © 6407-9197 [employer].  Care instructions adapted under license by [employer] (which disclaims liability or warranty for this information). If you have questions about a medical condition or this instruction, always ask your healthcare professional. [address] any warranty or liability for your use of this information. Learning About Attention Deficit Hyperactivity Disorder (ADHD) in Adults  What is ADHD? Attention deficit hyperactivity disorder (ADHD) is a condition in which people have a hard time paying attention. Adults with ADHD also may be more active than normal. They tend to act without thinking. ADHD may make it harder for them to focus, get organized, and finish tasks. ADHD most often starts in childhood and lasts into adulthood. Many adults don't know that they have ADHD until their children are diagnosed. Then they begin to see their own symptoms. Doctors don't know what causes ADHD. But it tends to run in families. What are the symptoms? The most common types of ADHD symptoms in adults are attention problems and hyperactivity. Attention problems  Adults with ADHD often find it hard to:  · Finish tasks that don't interest them or aren't easy. But they may become obsessed with activities that they find interesting and enjoy. · Keep relationships. · Focus their attention on conversations, reading materials, or jobs. They may change jobs a lot. · Remember things. They may misplace or lose things. · Pay attention. They are easily distracted. They find it hard to focus on one task. · Think before they act. They may make quick decisions. They may act before they think about the effect of their actions. Hyperactivity  Adults with ADHD may:  · Fidget. They may swing their legs, shift in their seats, or tap their fingers. · Move around a lot. They may feel \"revved up\" or on the go. They may not be able to slow down until they are very tired. · Find it hard to relax. They may feel restless and find it hard to do quiet things like read or watch TV. How does ADHD affect daily life?   ADHD in adults may affect:  · Job performance. They may find it hard to organize their work, manage their time, and focus on one task at a time. They may forget, misplace, or lose things. They may quit their jobs out of boredom. · Relationships. Adults with ADHD may find it hard to focus their attention on conversations. It is hard for them to \"read\" the behavior and moods of others and express their own feelings. · Temper. They may get easily frustrated. This often can make it harder for them to deal with stress. These adults may overreact and have a short, quick temper. · The ability to solve problems. Adults who have a hard time waiting for things they want may act before they think about the effect of their actions. They may take part in risky behaviors. These include unprotected sex, unsafe driving, alcohol and drug use, or unwise business ventures. How is ADHD treated? ADHD can be treated with medicines, behavior training, or counseling. Or it may be a combination of these treatments. Medicines  Stimulant medicines are most often used to treat ADHD. These may include:  · Amphetamines (such as Adderall and Dexedrine). · Methylphenidate (such as Concerta, Daytrana, Focalin, Metadate, and Ritalin). Other medicines that may be used are:  · Atomoxetine, such as Strattera, a nonstimulant medicine for ADHD. · Antihypertensives. These include clonidine (such as Catapres) and guanfacine (such as Tenex). · Antidepressants, which include bupropion (Wellbutrin). Behavior training  Behavior training can help adults with ADHD learn how to:  · Get organized. A daily organizer or planner can help these adults organize their daily tasks. They can write down appointments and other things they need to remember. · Decrease distractions. They can set up their work or home environment so that there are fewer things that will distract them. They may find using headphones or a \"white noise\" machine helpful.  College students can arrange a quiet living situation. They may need a single dorm room. · Work on relationships. Social skills training can help adults with ADHD relate to family, friends, and coworkers. Couples counseling or family therapy can also help improve relationships. Counseling  Counseling is not meant to treat inattention, hyperactivity, or impulsiveness. But it can help with some of the problems that go along with ADHD. These include not getting along well with others and having problems following rules. Where can you learn more? Go to http://rosales-gorge.info/. Enter Q773 in the search box to learn more about \"Learning About Attention Deficit Hyperactivity Disorder (ADHD) in Adults. \"  Current as of: July 26, 2016  Content Version: 11.2  © 0698-8619 [employer]. Care instructions adapted under license by [employer] (which disclaims liability or warranty for this information). If you have questions about a medical condition or this instruction, always ask your healthcare professional. [place] [address] any warranty or liability for your use of this information. Restless Legs Syndrome: Care Instructions  Your Care Instructions  Restless legs syndrome is a common nervous system problem. People with this syndrome feel a creeping, achy, or unpleasant feeling in the legs and an overpowering urge to move them. It often occurs in the evening and at night and can lead to sleep problems and tiredness. Your doctor may suggest doing a study of your sleep patterns to figure out what is happening when you try to sleep. Many people get relief from symptoms when they get regular exercise, eat well, and avoid caffeine, alcohol, and tobacco.  Follow-up care is a key part of your treatment and safety. Be sure to make and go to all appointments, and call your doctor if you are having problems.  It's also a good idea to know your test results and keep a list of the medicines you take. How can you care for yourself at home? · Take your medicines exactly as prescribed. Call your doctor if you think you are having a problem with your medicine. · Try bathing in hot or cold water. Applying a heating pad or ice bag to your legs may also help symptoms. · Stretch and massage your legs before bed or when discomfort begins. · Get some exercise for at least 30 minutes a day on most days of the week. Stop exercising at least 3 hours before bedtime. · Try to plan for situations where you will need to remain seated for long stretches. For example, if you are traveling by car, plan some stops so you can get out and walk around. · Tell your doctor about any medicines you are taking. This includes all over-the-counter, prescription, and herbal medicines. Some medicines, such as antidepressants, antihistamines, and cold and sinus medicines, can make your symptoms worse. · Avoid caffeine products, such as coffee, tea, cola, and chocolate. Caffeine can interrupt your sleep and stimulate you. · Do not smoke. Nicotine can make restless legs worse. If you need help quitting, talk to your doctor about stop-smoking programs and medicines. These can increase your chances of quitting for good. · Do not drink alcohol late in the evening. Take steps to help you sleep better  · Get plenty of sunlight in the outdoors, particularly later in the afternoon. · Use the evening hours for settling down. Avoid activities that challenge you in the hours before bedtime. · Eat meals at regular times, and do not snack before bedtime. · Keep your bedroom quiet, dark, and cool. Try using a sleep mask and earplugs to help you sleep. · Limit how much you drink at night to reduce your need to get up to urinate. But do not go to bed thirsty. · Run a fan or other steady \"white noise\" during the night if noises wake you up. · [place] the bed for sleeping and sex.  Do your reading or TV watching in another room.  · Once you are in bed, relax from head to toe, and guide your mind to pleasant thoughts. · Do not stay in bed longer than 8 hours, and try to avoid naps. · If your symptoms usually improve around 4 a.m. to 6 a.m., try going to bed later than usual or allowing extra time for sleeping in to help you get the rest you need. When should you call for help? Watch closely for changes in your health, and be sure to contact your doctor if:  · You are still not getting enough sleep. · Your symptoms become more severe or happen more often. Where can you learn more? Go to http://rosales-gorge.info/. Enter A652 in the search box to learn more about \"Restless Legs Syndrome: Care Instructions. \"  Current as of: October 14, 2016  Content Version: 11.2  © 2544-4752 [employer]. Care instructions adapted under license by [employer] (which disclaims liability or warranty for this information). If you have questions about a medical condition or this instruction, always ask your healthcare professional. [address] any warranty or liability for your use of this information. Learning About Benefits From Quitting Smoking  How does quitting smoking make you healthier? If you're thinking about quitting smoking, you may have a few reasons to be smoke-free. Your health may be one of them. · When you quit smoking, you lower your risks for cancer, lung disease, heart attack, stroke, blood vessel disease, and blindness from macular degeneration. · When you're smoke-free, you get sick less often, and you heal faster. You are less likely to get colds, flu, bronchitis, and pneumonia. · As a nonsmoker, you may find that your mood is better and you are less stressed. When and how will you feel healthier? Quitting has real health benefits that start from day 1 of being smoke-free.  And the longer you stay smoke-free, the healthier you get and the better you feel.  The first hours  · After just 20 minutes, your blood pressure and heart rate go down. That means there's less stress on your heart and blood vessels. · Within 12 hours, the level of carbon monoxide in your blood drops back to normal. That makes room for more oxygen. With more oxygen in your body, you may notice that you have more energy than when you smoked. After 2 weeks  · Your lungs start to work better. · Your risk of heart attack starts to drop. After 1 month  · When your lungs are clear, you cough less and breathe deeper, so it's easier to be active. · Your sense of taste and smell return. That means you can enjoy food more than you have since you started smoking. Over the years  · After 1 year, your risk of heart disease is half what it would be if you kept smoking. · After 5 years, your risk of stroke starts to shrink. Within a few years after that, it's about the same as if you'd never smoked. · After 10 years, your risk of dying from lung cancer is cut by about half. And your risk for many other types of cancer is lower too. How would quitting help others in your life? When you quit smoking, you improve the health of everyone who now breathes in your smoke. · Their heart, lung, and cancer risks drop, much like yours. · They are sick less. For babies and small children, living smoke-free means they're less likely to have ear infections, pneumonia, and bronchitis. · If you're a woman who is or will be pregnant someday, quitting smoking means a healthier . · Children who are close to you are less likely to become adult smokers. Where can you learn more? Go to http://rosales-gorge.info/. Enter [id number] [id number] in the search box to learn more about \"Learning About Benefits From Quitting Smoking. \"  Current as of: May 26, 2016  Content Version: 11.2  © 3065-3905 [employer].  Care instructions adapted under license by [employer] (which disclaims liability or warranty for this information). If you have questions about a medical condition or this instruction, always ask your healthcare professional. [address] any warranty or liability for your use of this information. Deciding About Using Medicines To Quit Smoking  What are the medicines you can use? Your doctor may prescribe varenicline (Chantix) or bupropion (Zyban). These medicines can help you cope with cravings for tobacco. They are pills that don't contain nicotine. You also can use nicotine replacement products. These do contain nicotine. There are many types. · Gum and lozenges slowly release nicotine into your mouth. · Patches stick to your skin. They slowly release nicotine into your bloodstream.  · An inhaler has a byers that contains nicotine. You breathe in a puff of nicotine vapor through your mouth and throat. · Nasal spray releases a mist that contains nicotine. What are key points about this decision? · Using medicines can double your chances of quitting smoking. They can ease cravings and withdrawal symptoms. · Getting counseling along with using medicine can raise your chances of quitting even more. · If you smoke fewer than 5 cigarettes a day, you may not need medicines to help you quit smoking. · These medicines have less nicotine than cigarettes. And by itself, nicotine is not nearly as harmful as smoking. The tars, carbon monoxide, and other toxic chemicals in tobacco cause the harmful effects. · The side effects of nicotine replacement products depend on the type of product. For example, a patch can make your skin red and itchy. Medicines in pill form can make you sick to your stomach. They can also cause dry mouth and trouble sleeping. For most people, the side effects are not bad enough to make them stop using the products. · FDA warning. The [place]  Food and Drug Administration (FDA) warns that people who are taking bupropion or varenicline and who have any serious or unusual changes in mood or behavior or who feel like hurting themselves or someone else should stop taking the medicine and call a doctor right away. If you already have a mood or behavior problem, be sure to tell your doctor before you decide to use these medicines. Why might you choose to use medicines to quit smoking? · You have tried on your own to stop smoking, but you were not able to stop. · You smoke more than 5 cigarettes a day. · You want to increase your chances of quitting smoking. · You want to reduce your cravings and withdrawal symptoms. · You feel the benefits of medicine outweigh the side effects. Why might you choose not to use medicine? · You want to try quitting on your own by stopping all at once (\"cold turkey\"). · You want to cut back slowly on the number of cigarettes you smoke. · You smoke fewer than 5 cigarettes a day. · You do not like using medicine. · You feel the side effects of medicines outweigh the benefits. · You are worried about the cost of medicines. Your decision  Thinking about the facts and your feelings can help you make a decision that is right for you. Be sure you understand the benefits and risks of your options, and think about what else you need to do before you make the decision. Where can you learn more? Go to http://rosales-gorge.info/. Enter V521 in the search box to learn more about \"Deciding About Using Medicines To Quit Smoking. \"  Current as of: May 26, 2016  Content Version: 11.2  © 5390-5584 [employer]. Care instructions adapted under license by [employer] (which disclaims liability or warranty for this information). If you have questions about a medical condition or this instruction, always ask your healthcare professional. [place] [address] any warranty or liability for your use of this information.        Stopping Smoking: Care Instructions  Your Care Instructions  Cigarette smokers crave the nicotine in cigarettes. Giving it up is much harder than simply changing a habit. Your body has to stop craving the nicotine. It is hard to quit, but you can do it. There are many tools that people use to quit smoking. You may find that combining tools works best for you. There are several steps to quitting. First you get ready to quit. Then you get support to help you. After that, you learn new skills and behaviors to become a nonsmoker. For many people, a necessary step is getting and using medicine. Your doctor will help you set up the plan that best meets your needs. You may want to attend a smoking cessation program to help you quit smoking. When you choose a program, look for one that has proven success. Ask your doctor for ideas. You will greatly increase your chances of success if you take medicine as well as get counseling or join a cessation program.  Some of the changes you feel when you first quit tobacco are uncomfortable. Your body will miss the nicotine at first, and you may feel short-tempered and grumpy. You may have trouble sleeping or concentrating. Medicine can help you deal with these symptoms. You may struggle with changing your smoking habits and rituals. The last step is the tricky one: Be prepared for the smoking urge to continue for a time. This is a lot to deal with, but keep at it. You will feel better. Follow-up care is a key part of your treatment and safety. Be sure to make and go to all appointments, and call your doctor if you are having problems. Its also a good idea to know your test results and keep a list of the medicines you take. How can you care for yourself at home? · Ask your family, friends, and coworkers for support. You have a better chance of quitting if you have help and support. · Join a support group, such as Nicotine Anonymous, for people who are trying to quit smoking.   · Consider signing up for a smoking cessation program, such as the American Lung Association's Freedom from Smoking program.  · Set a quit date. Pick your date carefully so that it is not right in the middle of a big deadline or stressful time. Once you quit, do not even take a puff. Get rid of all ashtrays and lighters after your last cigarette. Clean your house and your clothes so that they do not smell of smoke. · Learn how to be a nonsmoker. Think about ways you can avoid those things that make you reach for a cigarette. ¨ Avoid situations that put you at greatest risk for smoking. For some people, it is hard to have a drink with friends without smoking. For others, they might skip a coffee break with coworkers who smoke. ¨ Change your daily routine. Take a different route to work or eat a meal in a different place. · Cut down on stress. Calm yourself or release tension by doing an activity you enjoy, such as reading a book, taking a hot bath, or gardening. · Talk to your doctor or pharmacist about nicotine replacement therapy, which replaces the nicotine in your body. You still get nicotine but you do not use tobacco. Nicotine replacement products help you slowly reduce the amount of nicotine you need. These products come in several forms, many of them available over-the-counter:  ¨ Nicotine patches  ¨ Nicotine gum and lozenges  ¨ Nicotine inhaler  · Ask your doctor about bupropion (Wellbutrin) or varenicline (Chantix), which are prescription medicines. They do not contain nicotine. They help you by reducing withdrawal symptoms, such as stress and anxiety. · Some people find hypnosis, acupuncture, and massage helpful for ending the smoking habit. · Eat a healthy diet and get regular exercise. Having healthy habits will help your body move past its craving for nicotine. · Be prepared to keep trying. Most people are not successful the first few times they try to quit. Do not get mad at yourself if you smoke again.  Make a list of things you learned and think about when you want to try again, such as next week, next month, or next year. Where can you learn more? Go to http://rosales-gorge.info/. Enter V701 in the search box to learn more about \"Stopping Smoking: Care Instructions. \"  Current as of: May 26, 2016  Content Version: 11.2  © 0470-5638 [employer]. Care instructions adapted under license by [employer] (which disclaims liability or warranty for this information). If you have questions about a medical condition or this instruction, always ask your healthcare professional. [place] [address] any warranty or liability for your use of this information.

## 2017-07-07 ENCOUNTER — OFFICE VISIT (OUTPATIENT)
Dept: INTERNAL MEDICINE CLINIC | Age: 52
End: 2017-07-07

## 2017-07-07 VITALS
HEIGHT: 67 IN | HEART RATE: 100 BPM | OXYGEN SATURATION: 98 % | RESPIRATION RATE: 14 BRPM | WEIGHT: 239 LBS | DIASTOLIC BLOOD PRESSURE: 78 MMHG | BODY MASS INDEX: 37.51 KG/M2 | TEMPERATURE: 99.1 F | SYSTOLIC BLOOD PRESSURE: 126 MMHG

## 2017-07-07 DIAGNOSIS — E03.9 ACQUIRED HYPOTHYROIDISM: ICD-10-CM

## 2017-07-07 DIAGNOSIS — J40 BRONCHITIS: Primary | ICD-10-CM

## 2017-07-07 DIAGNOSIS — M79.89 LEG SWELLING: ICD-10-CM

## 2017-07-07 DIAGNOSIS — F41.1 GENERALIZED ANXIETY DISORDER: ICD-10-CM

## 2017-07-07 DIAGNOSIS — R13.19 OTHER DYSPHAGIA: ICD-10-CM

## 2017-07-07 DIAGNOSIS — Z72.0 TOBACCO ABUSE: ICD-10-CM

## 2017-07-07 RX ORDER — DOXYCYCLINE HYCLATE 100 MG
100 TABLET ORAL 2 TIMES DAILY
COMMUNITY
Start: 2017-07-06 | End: 2017-07-27

## 2017-07-07 RX ORDER — GUAIFENESIN AND CODEINE PHOSPHATE 100; 10 MG/5ML; MG/5ML
SYRUP ORAL
Refills: 0 | COMMUNITY
Start: 2017-07-02 | End: 2017-10-16 | Stop reason: ALTCHOICE

## 2017-07-07 RX ORDER — FUROSEMIDE 20 MG/1
20 TABLET ORAL DAILY
Qty: 10 TAB | Refills: 0 | Status: SHIPPED | OUTPATIENT
Start: 2017-07-07 | End: 2017-07-17

## 2017-07-07 RX ORDER — PREDNISONE 20 MG/1
TABLET ORAL
COMMUNITY
Start: 2017-07-02 | End: 2017-07-07 | Stop reason: ALTCHOICE

## 2017-07-07 RX ORDER — GABAPENTIN 300 MG/1
CAPSULE ORAL
COMMUNITY
Start: 2017-05-14 | End: 2017-10-16 | Stop reason: ALTCHOICE

## 2017-07-07 RX ORDER — ALBUTEROL SULFATE 90 UG/1
1 AEROSOL, METERED RESPIRATORY (INHALATION)
Qty: 1 INHALER | Refills: 0 | Status: SHIPPED | OUTPATIENT
Start: 2017-07-07 | End: 2017-08-05 | Stop reason: SDUPTHER

## 2017-07-07 RX ORDER — HYDROCORTISONE 25 MG/G
OINTMENT TOPICAL
COMMUNITY
Start: 2017-04-10 | End: 2020-07-28

## 2017-07-07 RX ORDER — BUPROPION HYDROCHLORIDE 75 MG/1
75 TABLET ORAL 2 TIMES DAILY
Qty: 60 TAB | Refills: 0 | Status: SHIPPED | OUTPATIENT
Start: 2017-07-07 | End: 2017-08-05 | Stop reason: SDUPTHER

## 2017-07-07 NOTE — MR AVS SNAPSHOT
Visit Information Date & Time Provider Department Dept. Phone Encounter #  
 7/7/2017  1:00 PM Dheeraj Brown, [address] [facility],[address] Internal Medicine [phone] [id number] Upcoming Health Maintenance Date Due DTaP/Tdap/Td series (1 - Tdap) 3/7/1988 PAP AKA CERVICAL CYTOLOGY 3/7/1988 BREAST CANCER SCRN MAMMOGRAM 3/7/2017 FOBT Q 1 YEAR AGE 50-75 3/7/2017 INFLUENZA AGE 9 TO ADULT 8/1/2017 Allergies as of 7/7/2017  Review Complete On: 5/22/2017 By: Chris Bhatia NP Severity Noted Reaction Type Reactions Tylenol-codeine #3 [Acetaminophen-codeine]  01/13/2016    Unknown (comments) Takes hydrocodone at home. Has used oxycodone at well Current Immunizations  Never Reviewed No immunizations on file. Not reviewed this visit You Were Diagnosed With   
  
 Codes Comments Bronchitis    -  Primary ICD-10-CM: Q21 ICD-9-CM: 651 Acquired hypothyroidism     ICD-10-CM: E03.9 ICD-9-CM: 244.9 Tobacco abuse     ICD-10-CM: Z72.0 ICD-9-CM: 305.1 Generalized anxiety disorder     ICD-10-CM: F41.1 ICD-9-CM: 300.02 Other dysphagia     ICD-10-CM: R13.19 ICD-9-CM: 787.29 Leg swelling     ICD-10-CM: M79.89 ICD-9-CM: 729.81 Vitals BP Pulse Temp Resp Height(growth percentile) Weight(growth percentile) 126/78 (BP 1 Location: Left arm, BP Patient Position: Sitting) 100 99.1 °F (37.3 °C) (Oral) 14 5' 7\" (1.702 m) 239 lb (108.4 kg) SpO2 BMI OB Status Smoking Status 98% 37.43 kg/m2 Menopause Former Smoker Vitals History BMI and BSA Data Body Mass Index Body Surface Area  
 37.43 kg/m 2 2.26 m 2 Preferred Pharmacy Pharmacy Name Phone [address], [address] Your Updated Medication List  
  
   
This list is accurate as of: 7/7/17  1:39 PM.  Always use your most recent med list.  
  
  
  
  
 albuterol 90 mcg/actuation inhaler Commonly known as:  PROVENTIL HFA, VENTOLIN HFA, PROAIR HFA Take 1 Puff by inhalation every six (6) hours as needed for Wheezing for up to 30 days. buPROPion 75 mg tablet Commonly known as:  [facility] Take 1 Tab by mouth two (2) times a day for 30 days. * dextroamphetamine-amphetamine 20 mg tablet Commonly known as:  ADDERALL Take 1 Tab (20 mg total) by mouth two (2) times a dayEarliest Fill Date: 6/21/17. Max Daily Amount: 40 mg  
  
 * dextroamphetamine-amphetamine 20 mg tablet Commonly known as:  ADDERALL Take 1 Tab (20 mg total) by mouth two (2) times a dayEarliest Fill Date: 7/21/17. Max Daily Amount: 40 mg  
Start taking on:  7/21/2017  
  
 doxycycline 100 mg tablet Commonly known as:  VIBRA-TABS Take 100 mg by mouth two (2) times a day. furosemide 20 mg tablet Commonly known as:  LASIX Take 1 Tab by mouth daily for 10 doses. * gabapentin 300 mg capsule Commonly known as:  NEURONTIN  
  
 * gabapentin 400 mg capsule Commonly known as:  NEURONTIN Take 2 Caps by mouth nightly. guaiFENesin -10 mg/5 mL solution Generic drug:  guaiFENesin-codeine  
take 10 milliliters by mouth every 4 hours  
  
 hydrocortisone 2.5 % ointment Commonly known as:  HYTONE  
  
 levothyroxine 75 mcg tablet Commonly known as:  SYNTHROID Take 1 Tab by mouth Daily (before breakfast). * Notice: This list has 4 medication(s) that are the same as other medications prescribed for you. Read the directions carefully, and ask your doctor or other care provider to review them with you. Prescriptions Sent to Pharmacy Refills  
 albuterol (PROVENTIL HFA, VENTOLIN HFA, PROAIR HFA) 90 mcg/actuation inhaler 0 Sig: Take 1 Puff by inhalation every six (6) hours as needed for Wheezing for up to 30 days.   
 Class: Normal  
 Pharmacy: [facility], [address] [facility] [address] [facility] [place] [address] Ph #: [phone] Route: Inhalation buPROPion (WELLBUTRIN) 75 mg tablet 0 Sig: Take 1 Tab by mouth two (2) times a day for 30 days. Class: Normal  
 Pharmacy: [address] [facility], [address] [place] [address] 71 [address] [facility] Ph #: [phone] Route: Oral  
 furosemide (LASIX) 20 mg tablet 0 Sig: Take 1 Tab by mouth daily for 10 doses. Class: Normal  
 Pharmacy: [facility], [address] [place] [address] 71 1031 [facility] Ph #: [phone] Route: Oral  
  
We Performed the Following REFERRAL TO GASTROENTEROLOGY [IXH34 Custom] Referral Information Referral ID Referred By Referred To  
  
 [id number] [facility] [address] [facility] [phone] [facility], [address] Visits Status Start Date End Date 1 New Request 7/7/17 7/7/18 If your referral has a status of pending review or denied, additional information will be sent to support the outcome of this decision. Introducing hospitals & HEALTH SERVICES! Dear Chhaya Chin: Thank you for requesting a Impe[employer] account. Our records indicate that you already have an active Impeva account. You can access your account anytime at [email or web address] Did you know that you can access your hospital and ER discharge instructions at any time in Impeva? You can also review all of your test results from your hospital stay or ER visit. Additional Information If you have questions, please visit the Frequently Asked Questions section of the Impeva website at [email or web address] Remember, [employer]peva is NOT to be used for urgent needs. For medical emergencies, dial 911. Now available from your iPhone and Android! Please provide this summary of care documentation to your next provider. Your primary care clinician is listed as Carla Jerome.  If you have any questions after today's visit, please call [phone].

## 2017-07-07 NOTE — PROGRESS NOTES
Written by Lev Dixon, as dictated by Dr. Padma Carl MD.    Iris Samuel is a 50 y.o. female. HPI  The patient comes in today c/o cough x 3 weeks. Last weekend she got a high fever and severe sore throat in addition to the cough. She went to [facility] and got a chest XR, which was normal. She was given a steroid, codeine, and an antibiotic. She was told if it didn't improve in 3 days she should start doxycycline, which she did end up starting yesterday. Her face feels swollen. She has been having more frequent, smaller BMs but denies diarrhea or urinary issues. Today is the worst she has felt. She does smoke but has not been smoking lately. She tried Chantix but she did not feel well on it. She has not tried Wellbutrin. She has gained weight since her last visit, from 218 lbs to 239 lbs. She has been experiencing fluid retention and her legs are swollen. She has been taking Effexor for her hot flashes which is when he weight gain started. She has since d/c'ed Effexor. She has not been eating anything out of the ordinary. She is compliant on gabapentin. She has been experiencing trouble swallowing more often lately. Often food gets stuck in her throat and she needs to drink a lot of water to get it down. She has a fhx of a small esophagus, and a relative had to get one stretched out.  She would like to see a gastroenterologist.    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 180 Cap 2    dextroamphetamine-amphetamine (ADDERALL) 20 mg tablet Take 1 Tab (20 mg total) by mouth two (2) times a dayEarliest Fill Date: 6/21/17. Max Daily Amount: 40 mg 60 Tab 0    [START ON 7/21/2017] dextroamphetamine-amphetamine (ADDERALL) 20 mg tablet Take 1 Tab (20 mg total) by mouth two (2) times a dayEarliest Fill Date: 7/21/17. Max Daily Amount: 40 mg 60 Tab 0    levothyroxine (SYNTHROID) 75 mcg tablet Take 1 Tab by mouth Daily (before breakfast). 90 Tab 1     No current facility-administered medications on file prior to visit. Allergies   Allergen Reactions    Tylenol-Codeine #3 [Acetaminophen-Codeine] Unknown (comments)     Takes hydrocodone at home. Has used oxycodone at well       Past Medical History:   Diagnosis Date    Arthritis     Colitis     [facility]     Ill-defined condition     ADHD    Ill-defined condition     Hep C    Thyroid disease     Ulcerative colitis ([facility]        Past Surgical History:   Procedure Laterality Date    HX KNEE ARTHROSCOPY      x3- last done 9/2015 to harvest cartilage    HX ORTHOPAEDIC Left 1/14/2016    articular cartilage transplant    HX TONSILLECTOMY  1988       Family History   Problem Relation Age of Onset    Cancer Mother      melanoma    Hypertension Father     Cancer Father      melanoma    Arthritis-osteo Father     No Known Problems Sister        Social History     Social History    Marital status:      Spouse name: N/A    Number of children: N/A    Years of education: N/A     Occupational History    Not on file. Social History Main Topics    Smoking status: Former Smoker     Quit date: 11/25/2014    Smokeless tobacco: Current User      Comment: uses e cigarettes    Alcohol use No    Drug use: No    Sexual activity: No     Other Topics Concern    Not on file     Social History Narrative         Review of Systems   Constitutional: Negative for malaise/fatigue.    HENT: Negative for congestion. Respiratory: Positive for cough. Negative for shortness of breath. Cardiovascular: Positive for leg swelling. Negative for chest pain and palpitations. Musculoskeletal: Negative for joint pain and myalgias. Neurological: Negative for weakness and headaches. Psychiatric/Behavioral: Negative for depression, memory loss and substance abuse. The patient is nervous/anxious. Visit Vitals    /78 (BP 1 Location: Left arm, BP Patient Position: Sitting)    Pulse 100    Temp 99.1 °F (37.3 °C) (Oral)    Resp 14    Ht 5' 7\" (1.702 m)    Wt 239 lb (108.4 kg)    SpO2 98%    BMI 37.43 kg/m2       Physical Exam   Constitutional: She is oriented to person, place, and time. She appears well-developed. No distress. Obese   HENT:   Right Ear: External ear normal.   Left Ear: External ear normal.   Eyes: Conjunctivae and EOM are normal. Right eye exhibits no discharge. Left eye exhibits no discharge. Neck: Normal range of motion. Neck supple. Cardiovascular: Normal rate and regular rhythm. Pulmonary/Chest: Effort normal and breath sounds normal. She has no wheezes. Scattered rhonchi   Abdominal: Soft. Bowel sounds are normal. There is no tenderness. Lymphadenopathy:     She has no cervical adenopathy. Neurological: She is alert and oriented to person, place, and time. Skin: She is not diaphoretic. Psychiatric: She has a normal mood and affect. Her behavior is normal.   Nursing note and vitals reviewed. ASSESSMENT and PLAN    ICD-10-CM ICD-9-CM    1. Bronchitis J40 490 albuterol (PROVENTIL HFA, VENTOLIN HFA, PROAIR HFA) 90 mcg/actuation inhaler sent to pharmacy    I advised she finish the course of doxycycline. I recommended she use an albuterol inhaler BID and 2-3 saltwater gargles per day for her sore throat. 2. Acquired hypothyroidism E03.9 244.9 I discussed that we cannot get her blood work done while she is sick.    3. Tobacco abuse Z72.0 305.1   I discussed that smoking increases her risk of throat and mouth cancers so she needs to quit smoking. 4. Generalized anxiety disorder F41.1 300.02 buPROPion (WELLBUTRIN) 75 mg tablet sent to pharmacy    I discussed that Wellbutrin can help her quit smoking and lose weight in addition to helping with her anxiety. 5. Other dysphagia R13.19 787.29 REFERRAL TO GASTROENTEROLOGY    I referred her to gastroenterology for her dysphagia. 6. Leg swelling M79.89 729.81 furosemide (LASIX) 20 mg tablet sent to pharmacy    I discussed that steroids may have contributed to her fluid retention. This plan was reviewed with the patient and patient agrees. All questions were answered. This scribe documentation was reviewed by me and accurately reflects the examination and decisions made by me. This note will not be viewable in [address]

## 2017-07-07 NOTE — PROGRESS NOTES
1. Have you been to the ER, urgent care clinic since your last visit? Hospitalized since your last visit? Went to [facility] Sunday, chest x-ray was normal.  Was given antibiotic. 2. Have you seen or consulted any other health care providers outside of the [address] [place] [address] since your last visit? Include any pap smears or colon screening. See above. Due for pap smear and mammogram.    Chief Complaint   Patient presents with    Cough     x2wks, dry and nagging. Fever last Sunday. Started Antibiotic yesterday, is taking codiene cough syrup with some relief. No difficulty breathing.  Swelling     generalized.  Sore Throat     x2wks, worse with swallowing. Relief with hot fluids. Lost voice over the weekend. Not fasting.

## 2017-08-05 DIAGNOSIS — F41.1 GENERALIZED ANXIETY DISORDER: ICD-10-CM

## 2017-08-05 DIAGNOSIS — J40 BRONCHITIS: ICD-10-CM

## 2017-08-06 RX ORDER — BUPROPION HYDROCHLORIDE 75 MG/1
TABLET ORAL
Qty: 60 TAB | Refills: 0 | Status: SHIPPED | OUTPATIENT
Start: 2017-08-06 | End: 2017-09-03 | Stop reason: SDUPTHER

## 2017-08-06 RX ORDER — ALBUTEROL SULFATE 90 UG/1
AEROSOL, METERED RESPIRATORY (INHALATION)
Qty: 1 INHALER | Refills: 1 | Status: SHIPPED | OUTPATIENT
Start: 2017-08-06 | End: 2017-11-04

## 2017-08-28 RX ORDER — DEXTROAMPHETAMINE SACCHARATE, AMPHETAMINE ASPARTATE, DEXTROAMPHETAMINE SULFATE AND AMPHETAMINE SULFATE 5; 5; 5; 5 MG/1; MG/1; MG/1; MG/1
20 TABLET ORAL 2 TIMES DAILY
Qty: 60 TAB | Refills: 0 | Status: SHIPPED | OUTPATIENT
Start: 2017-08-28 | End: 2017-09-27

## 2017-08-28 NOTE — TELEPHONE ENCOUNTER
Last refill 7/21/17  Last visit 7/7/17    Do you want me to do a 3 month supply and pre date her med? Or do you want her to come in next month for a 3 month supply.

## 2017-09-03 DIAGNOSIS — F41.1 GENERALIZED ANXIETY DISORDER: ICD-10-CM

## 2017-09-04 RX ORDER — BUPROPION HYDROCHLORIDE 75 MG/1
TABLET ORAL
Qty: 60 TAB | Refills: 0 | Status: SHIPPED | OUTPATIENT
Start: 2017-09-04 | End: 2017-10-16 | Stop reason: ALTCHOICE

## 2017-10-16 ENCOUNTER — OFFICE VISIT (OUTPATIENT)
Dept: INTERNAL MEDICINE CLINIC | Age: 52
End: 2017-10-16

## 2017-10-16 VITALS
RESPIRATION RATE: 20 BRPM | DIASTOLIC BLOOD PRESSURE: 74 MMHG | BODY MASS INDEX: 38.3 KG/M2 | TEMPERATURE: 98 F | SYSTOLIC BLOOD PRESSURE: 128 MMHG | WEIGHT: 244 LBS | HEIGHT: 67 IN | OXYGEN SATURATION: 98 % | HEART RATE: 67 BPM

## 2017-10-16 DIAGNOSIS — Z23 NEED FOR VACCINE FOR TD (TETANUS-DIPHTHERIA): ICD-10-CM

## 2017-10-16 DIAGNOSIS — M25.561 CHRONIC PAIN OF BOTH KNEES: ICD-10-CM

## 2017-10-16 DIAGNOSIS — E66.9 OBESITY (BMI 30-39.9): ICD-10-CM

## 2017-10-16 DIAGNOSIS — F90.2 ATTENTION DEFICIT HYPERACTIVITY DISORDER (ADHD), COMBINED TYPE: ICD-10-CM

## 2017-10-16 DIAGNOSIS — R12 HEART BURN: Primary | ICD-10-CM

## 2017-10-16 DIAGNOSIS — Z23 ENCOUNTER FOR IMMUNIZATION: ICD-10-CM

## 2017-10-16 DIAGNOSIS — G89.29 CHRONIC PAIN OF BOTH KNEES: ICD-10-CM

## 2017-10-16 DIAGNOSIS — M25.562 CHRONIC PAIN OF BOTH KNEES: ICD-10-CM

## 2017-10-16 RX ORDER — OMEPRAZOLE 40 MG/1
40 CAPSULE, DELAYED RELEASE ORAL DAILY
Qty: 30 CAP | Refills: 0 | Status: SHIPPED | OUTPATIENT
Start: 2017-10-16 | End: 2017-11-08 | Stop reason: SDUPTHER

## 2017-10-16 RX ORDER — PHENTERMINE HYDROCHLORIDE 37.5 MG/1
37.5 TABLET ORAL
Qty: 14 TAB | Refills: 0 | Status: SHIPPED | OUTPATIENT
Start: 2017-10-16 | End: 2017-10-30

## 2017-10-16 RX ORDER — ESOMEPRAZOLE MAGNESIUM 40 MG/1
CAPSULE, DELAYED RELEASE ORAL DAILY
COMMUNITY
End: 2017-10-16 | Stop reason: ALTCHOICE

## 2017-10-16 NOTE — MR AVS SNAPSHOT
Visit Information Date & Time Provider Department Dept. Phone Encounter #  
 10/16/2017 12:30 PM Tim Hong MD [facility] Internal Medicine [phone] [id number] Your Appointments 11/1/2017 11:45 AM  
ROUTINE CARE with Tim Hong MD  
[facility] Internal Medicine [facility]) Appt Note: Follow Up  
 [facility]  [address] [facility] [id number]  
[address] [facility] [address] [place] [id number] Upcoming Health Maintenance Date Due  
 PAP AKA CERVICAL CYTOLOGY 3/7/1988 FOBT Q 1 YEAR AGE 50-75 3/7/2017 BREAST CANCER SCRN MAMMOGRAM 10/16/2019 DTaP/Tdap/Td series (2 - Td) 10/16/2027 Allergies as of 10/16/2017  Review Complete On: 10/16/2017 By: Isabelle Conn LPN Severity Noted Reaction Type Reactions Tylenol-codeine #3 [Acetaminophen-codeine]  01/13/2016    Unknown (comments) Takes hydrocodone at home. Has used oxycodone at well Current Immunizations  Never Reviewed Name Date Influenza Vaccine James Jamison) 10/16/2017 Not reviewed this visit You Were Diagnosed With   
  
 Codes Comments Heart burn    -  Primary ICD-10-CM: R12 
ICD-9-CM: 787.1 Chronic pain of both knees     ICD-10-CM: M25.561, M25.562, G89.29 ICD-9-CM: 719.46, 338.29 Obesity (BMI 30-39. 9)     ICD-10-CM: E66.9 ICD-9-CM: 278.00 Attention deficit hyperactivity disorder (ADHD), combined type     ICD-10-CM: F90.2 ICD-9-CM: 314.01 Need for vaccine for TD (tetanus-diphtheria)     ICD-10-CM: Cheral Pascale ICD-9-CM: V06.5 Encounter for immunization     ICD-10-CM: Z61 ICD-9-CM: V03.89 Vitals BP Pulse Temp Resp Height(growth percentile) Weight(growth percentile) 128/74 67 98 °F (36.7 °C) (Oral) 20 5' 7\" (1.702 m) 244 lb (110.7 kg) SpO2 BMI OB Status Smoking Status 98% 38.22 kg/m2 Menopause Former Smoker BMI and BSA Data  Body Mass Index Body Surface Area  
 38.22 kg/m 2 2.29 m 2  
  
 Preferred Pharmacy Pharmacy Name Phone [address], [address] [place] [address] [address] Your Updated Medication List  
  
   
This list is accurate as of: 10/16/17  3:31 PM.  Always use your most recent med list.  
  
  
  
  
 Diphth, Pertus(Acell), Tetanus 2.5-8-5 Lf-mcg-Lf/0.5mL Syrg Commonly known as:  ACEL  
0.5 mL by IntraMUSCular route once for 1 dose.  
  
 hydrocortisone 2.5 % ointment Commonly known as:  HYTONE  
  
 levothyroxine 75 mcg tablet Commonly known as:  SYNTHROID Take 1 Tab by mouth Daily (before breakfast). omeprazole 40 mg capsule Commonly known as:  PRILOSEC Take 1 Cap by mouth daily for 30 days. phentermine 37.5 mg tablet Commonly known as:  ADIPEX-P Take 1 Tab by mouth every morning for 14 doses. Max Daily Amount: 37.5 mg. VENTOLIN HFA 90 mcg/actuation inhaler Generic drug:  albuterol INHALE 1 PUFF BY MOUTH EVERY 6 HOURS AS NEEDED FOR WHEEZING Prescriptions Printed Refills  
 phentermine (ADIPEX-P) 37.5 mg tablet 0 Sig: Take 1 Tab by mouth every morning for 14 doses. Max Daily Amount: 37.5 mg.  
 Class: Print Route: Oral  
  
Prescriptions Sent to Pharmacy Refills  
 omeprazole (PRILOSEC) 40 mg capsule 0 Sig: Take 1 Cap by mouth daily for 30 days. Class: Normal  
 Pharmacy: [address] [place] [address] Ph #: [phone] Route: Oral  
 Diphth, Pertus,Acell,, Tetanus (ACEL) 2.5-8-5 Lf-mcg-Lf/0.5mL syrg 0 Si.5 mL by IntraMUSCular route once for 1 dose. Class: Normal  
 Pharmacy: [address] [place] [address] Ph #: [phone] Route: IntraMUSCular We Performed the Following INFLUENZA VACCINE QUADRIVALENT VIAL, SPLIT, 3 YRS PLUS IM [id number] CPT(R)] Introducing [facility] & HEALTH SERVICES! Dear Angie Altamirano: Thank you for requesting a Sagence account. Our records indicate that you already have an active Sagence account. You can access your account anytime at [email or web address] Did you know that you can access your hospital and ER discharge instructions at any time in Sagence? You can also review all of your test results from your hospital stay or ER visit. Additional Information If you have questions, please visit the Frequently Asked Questions section of the Sagence website at [email or web address] Remember, Sagence is NOT to be used for urgent needs. For medical emergencies, dial 911. Now available from your iPhone and Android! Please provide this summary of care documentation to your next provider. Your primary care clinician is listed as Romelia West. If you have any questions after today's visit, please call [phone].

## 2017-10-16 NOTE — PROGRESS NOTES
Written by Mariano Keenan, as dictated by Dr. Homer Tatum MD.    Billy Guzman is a 50 y.o. female. HPI  The patient comes in today to discuss weight gain. She had been taking gabapentin for her knee pain, but she had been experiencing a lot of swelling so she d/c'ed. She started yoga and stopped antidepressants, but she has still gained weight. She has tried Wellbutrin in the past for 3 months but did not lose any weight on it. 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 She would like to start on phentermine, which she has never tried before. She quit smoking 6 weeks ago. She used the patch. She experiences heartburn. She had a barium swallow test in the past, which was normal. She has been taking Nexium every day, which has been helping. She follows with a doctor at [facility], where she got her ADD testing done and is prescribed her Adderall from there. She has never had a mammogram. She is due for her next pap smear. She has never had a Tdap vaccine. 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 90 Tab 1     No current facility-administered medications on file prior to visit.         Allergies   Allergen Reactions    Tylenol-Codeine #3 [Acetaminophen-Codeine] Unknown (comments)     Takes hydrocodone at home. Has used oxycodone at well       Past Medical History:   Diagnosis Date    Arthritis     Colitis     [facility]     Ill-defined condition     ADHD    Ill-defined condition     Hep C    Thyroid disease     Ulcerative colitis ([facility]        Past Surgical History:   Procedure Laterality Date    HX KNEE ARTHROSCOPY      x3- last done 9/2015 to harvest cartilage    HX ORTHOPAEDIC Left 1/14/2016    articular cartilage transplant    HX TONSILLECTOMY  1988       Family History   Problem Relation Age of Onset    Cancer Mother      melanoma    Hypertension Father     Cancer Father      melanoma    Arthritis-osteo Father     No Known Problems Sister        Social History     Social History    Marital status:      Spouse name: N/A    Number of children: N/A    Years of education: N/A     Occupational History    Not on file. Social History Main Topics    Smoking status: Former Smoker     Quit date: 11/25/2014    Smokeless tobacco: Current User      Comment: uses e cigarettes    Alcohol use No    Drug use: No    Sexual activity: No     Other Topics Concern    Not on file     Social History Narrative           Review of Systems   Constitutional: Negative for malaise/fatigue. HENT: Negative for congestion. Respiratory: Negative for cough and shortness of breath. Gastrointestinal: Positive for heartburn. Negative for abdominal pain. Musculoskeletal: Positive for joint pain. Negative for myalgias. Neurological: Negative for weakness. Visit Vitals    /74    Pulse 67    Temp 98 °F (36.7 °C) (Oral)    Resp 20    Ht 5' 7\" (1.702 m)    Wt 244 lb (110.7 kg)    SpO2 98%    BMI 38.22 kg/m2       Physical Exam   Constitutional: She is oriented to person, place, and time. She appears well-developed. No distress.    Obese   HENT:   Right Ear: External ear normal.   Left Ear: External ear normal.   Eyes: Conjunctivae and EOM are normal. Right eye exhibits no discharge. Left eye exhibits no discharge. Neck: Normal range of motion. Neck supple. Cardiovascular: Normal rate and regular rhythm. Pulmonary/Chest: Effort normal and breath sounds normal. She has no wheezes. Abdominal: Soft. Bowel sounds are normal. There is no tenderness. Lymphadenopathy:     She has no cervical adenopathy. Neurological: She is alert and oriented to person, place, and time. Skin: She is not diaphoretic. Psychiatric: She has a normal mood and affect. Her behavior is normal.   Nursing note and vitals reviewed. ASSESSMENT and PLAN    ICD-10-CM ICD-9-CM    1. Heart burn R12 787.1 omeprazole (PRILOSEC) 40 mg capsule sent to pharmacy    Prilosec prescribed. 2. Chronic pain of both knees M25.561 719.46 Weight loss will help with this. M25.562 338.29     G89.29     3. Obesity (BMI 30-39. 9) E66.9 278.00 phentermine (ADIPEX-P) 37.5 mg tablet script given to patient    Phentermine prescribed. She should return in 2 weeks for an EKG and follow-up, and she should have lost 5-6 lbs by then. She should not take this medication with Adderall. Side effects discussed with pt.   4. Attention deficit hyperactivity disorder (ADHD), combined type F90.2 314.01 I advised she not take Adderall while she is taking phentermine. This plan was reviewed with the patient and patient agrees. All questions were answered. This scribe documentation was reviewed by me and accurately reflects the examination and decisions made by me. This note will not be viewable in [address]

## 2017-11-01 ENCOUNTER — OFFICE VISIT (OUTPATIENT)
Dept: INTERNAL MEDICINE CLINIC | Age: 52
End: 2017-11-01

## 2017-11-01 VITALS
BODY MASS INDEX: 36.76 KG/M2 | DIASTOLIC BLOOD PRESSURE: 80 MMHG | WEIGHT: 234.2 LBS | RESPIRATION RATE: 18 BRPM | OXYGEN SATURATION: 97 % | TEMPERATURE: 99.6 F | HEIGHT: 67 IN | SYSTOLIC BLOOD PRESSURE: 118 MMHG | HEART RATE: 104 BPM

## 2017-11-01 DIAGNOSIS — E66.9 OBESITY (BMI 30-39.9): ICD-10-CM

## 2017-11-01 DIAGNOSIS — R12 HEART BURN: ICD-10-CM

## 2017-11-01 DIAGNOSIS — R00.0 SINUS TACHYCARDIA: ICD-10-CM

## 2017-11-01 DIAGNOSIS — Z12.39 BREAST CANCER SCREENING: ICD-10-CM

## 2017-11-01 DIAGNOSIS — J06.9 URI, ACUTE: Primary | ICD-10-CM

## 2017-11-01 RX ORDER — PHENTERMINE HYDROCHLORIDE 37.5 MG/1
37.5 TABLET ORAL
Qty: 30 TAB | Refills: 0 | Status: SHIPPED | OUTPATIENT
Start: 2017-11-01 | End: 2017-11-29 | Stop reason: SDUPTHER

## 2017-11-01 RX ORDER — PHENTERMINE HYDROCHLORIDE 37.5 MG/1
37.5 TABLET ORAL
COMMUNITY
End: 2018-02-28 | Stop reason: ALTCHOICE

## 2017-11-01 NOTE — PROGRESS NOTES
Pt here for medication follow up. She also has c/o a cold that she states that she has had for 2 weeks. She states that she has been running a fever and today is the first day that she has felt better. Chief Complaint   Patient presents with    Follow-up     medication follow up    Cold Symptoms     for 2 weeks      Visit Vitals    /80 (BP 1 Location: Right arm, BP Patient Position: Sitting)    Pulse (!) 104    Temp 99.6 °F (37.6 °C) (Oral)    Resp 18    Ht 5' 7\" (1.702 m)    Wt 234 lb 3.2 oz (106.2 kg)    SpO2 97%    BMI 36.68 kg/m2     1. Have you been to the ER, urgent care clinic since your last visit? Hospitalized since your last visit? No     2. Have you seen or consulted any other health care providers outside of the [address] [place] [address] since your last visit? Include any pap smears or colon screening. Focus MD for adderall medication last saw the end of October.

## 2017-11-01 NOTE — PROGRESS NOTES
Written by Magui Cosme, as dictated by Dr. Keke Adkins MD.    Mathew Kwok is a 50 y.o. female. HPI  The patient comes in today for a 2-week phentermine follow-up. She has been taking phentermine and has lost weight, from 244 lbs on 10/16 to 234 lbs today. She denies palpitations or constipation. Her pulse is high in the office today at 104, but she admits she had several cups of coffee today. She has also joined [employer] and feels like it is working well with phentermine. She feels very thirsty and has been drinking a lot of water lately. She has not taken her phentermine today yet. She has not been taking Adderall. She has not been taking Synthroid, as her TSH was normal when last checked when she had not been taking it. She is compliant on omeprazole, which has been helping. She has been feeling sick x 4 days, and has not left bed since today. She admits to coughing, congestion, and sore throat. She has been taking vitamin C supplements. She got her flu shot last month. She has not had a mammogram or Pap smear done yet. 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 30 Cap 0    VENTOLIN HFA 90 mcg/actuation inhaler INHALE 1 PUFF BY MOUTH EVERY 6 HOURS AS NEEDED FOR WHEEZING 1 Inhaler 1    hydrocortisone (HYTONE) 2.5 % ointment       levothyroxine (SYNTHROID) 75 mcg tablet Take 1 Tab by mouth Daily (before breakfast).  90 Tab 1     No current facility-administered medications on file prior to visit. Allergies   Allergen Reactions    Tylenol-Codeine #3 [Acetaminophen-Codeine] Unknown (comments)     Takes hydrocodone at home. Has used oxycodone at well       Past Medical History:   Diagnosis Date    Arthritis     Colitis     [facility] [address]     Ill-defined condition     ADHD    Ill-defined condition     Hep C    Thyroid disease     Ulcerative colitis ([facility] [address]        Past Surgical History:   Procedure Laterality Date    HX KNEE ARTHROSCOPY      x3- last done 9/2015 to harvest cartilage    HX ORTHOPAEDIC Left 1/14/2016    articular cartilage transplant    HX TONSILLECTOMY  1988       Family History   Problem Relation Age of Onset    Cancer Mother      melanoma    Hypertension Father     Cancer Father      melanoma    Arthritis-osteo Father     No Known Problems Sister        Social History     Social History    Marital status:      Spouse name: N/A    Number of children: N/A    Years of education: N/A     Occupational History    Not on file. Social History Main Topics    Smoking status: Former Smoker     Quit date: 11/25/2014    Smokeless tobacco: Current User      Comment: uses e cigarettes    Alcohol use No    Drug use: No    Sexual activity: No     Other Topics Concern    Not on file     Social History Narrative       Review of Systems   Constitutional: Negative for malaise/fatigue. HENT: Positive for congestion. Respiratory: Positive for cough. Negative for shortness of breath. Cardiovascular: Negative for chest pain and palpitations. Gastrointestinal: Negative for abdominal pain, constipation and heartburn. Musculoskeletal: Negative for joint pain and myalgias. Neurological: Negative for weakness and headaches. Psychiatric/Behavioral: Negative for depression, memory loss and substance abuse.      Visit Vitals    /80 (BP 1 Location: Right arm, BP Patient Position: Sitting)    Pulse (!) 104    Temp 99.6 °F (37.6 °C) (Oral)    Resp 18    Ht 5' 7\" (1.702 m)    Wt 234 lb 3.2 oz (106.2 kg)    SpO2 97%    BMI 36.68 kg/m2       Physical Exam   Constitutional: She is oriented to person, place, and time. She appears well-developed. No distress. Obese   HENT:   Right Ear: External ear normal.   Left Ear: External ear normal.   Eyes: Conjunctivae and EOM are normal.   Neck: Normal range of motion. Neck supple. Cardiovascular: Regular rhythm. Tachycardiac   Pulmonary/Chest: Effort normal and breath sounds normal. She has no wheezes. Abdominal: Soft. Bowel sounds are normal. There is no tenderness. Lymphadenopathy:     She has no cervical adenopathy. Neurological: She is alert and oriented to person, place, and time. Skin: She is not diaphoretic. Psychiatric: She has a normal mood and affect. Her behavior is normal.   Nursing note and vitals reviewed. ASSESSMENT and PLAN    ICD-10-CM ICD-9-CM    1. URI, acute J06.9 465.9 Since she is already  feeling better I recommended rest, fluids, and vitamin C.   2. Heart burn R12 787.1 Compliant on omeprazole. 3. Obesity (BMI 30-39. 9) E66.9 278.00 AMB POC EKG ROUTINE W/ 12 LEADS, INTER & REP      phentermine (ADIPEX-P) 37.5 mg tablet script given to patient    EKG in office showed sinus tachycardia. Phentermine refilled for 30 days, and she should follow-up in one month. 4. Sinus tachycardia R00.0 427.89 Pt had several cups of coffee in office waiting room before her appointment. 5. Breast cancer screening Z12.31 V76.10 [facility] BI SCREENING INCL CAD    I want her to get her mammogram.        This plan was reviewed with the patient and patient agrees. All questions were answered. This scribe documentation was reviewed by me and accurately reflects the examination and decisions made by me. This note will not be viewable in [address]

## 2017-11-01 NOTE — MR AVS SNAPSHOT
Visit Information Date & Time Provider Department Dept. Phone Encounter #  
 11/1/2017 11:45 AM Kiara Sanchez MD [facility] [phone] [id number] Your Appointments 11/29/2017 12:15 PM  
ACUTE CARE with Kiara Sanchez MD  
[facility]) Appt Note: skin tag removal  
 [facility] [address] [facility] [address]  
[address] [address] Upcoming Health Maintenance Date Due  
 PAP AKA CERVICAL CYTOLOGY 3/7/1988 FOBT Q 1 YEAR AGE 50-75 3/7/2017 BREAST CANCER SCRN MAMMOGRAM 10/16/2019 DTaP/Tdap/Td series (2 - Td) 10/16/2027 Allergies as of 11/1/2017  Review Complete On: 11/1/2017 By: Wanda Sanchez LPN Severity Noted Reaction Type Reactions Tylenol-codeine #3 [Acetaminophen-codeine]  01/13/2016    Unknown (comments) Takes hydrocodone at home. Has used oxycodone at well Current Immunizations  Never Reviewed Name Date Influenza Vaccine Alvaro Lobstein) 10/16/2017 Not reviewed this visit You Were Diagnosed With   
  
 Codes Comments URI, acute    -  Primary ICD-10-CM: J06.9 ICD-9-CM: 465.9 Heart burn     ICD-10-CM: R12 
ICD-9-CM: 787.1 Obesity (BMI 30-39. 9)     ICD-10-CM: E66.9 ICD-9-CM: 278.00 Sinus tachycardia     ICD-10-CM: R00.0 ICD-9-CM: 427.89 Breast cancer screening     ICD-10-CM: Z12.31 
ICD-9-CM: V76.10 Vitals BP Pulse Temp Resp Height(growth percentile) Weight(growth percentile) 118/80 (BP 1 Location: Right arm, BP Patient Position: Sitting) (!) 104 99.6 °F (37.6 °C) (Oral) 18 5' 7\" (1.702 m) 234 lb 3.2 oz (106.2 kg) SpO2 BMI OB Status Smoking Status 97% 36.68 kg/m2 Menopause Former Smoker Vitals History BMI and BSA Data Body Mass Index Body Surface Area  
 36.68 kg/m 2 2.24 m 2 Preferred Pharmacy Pharmacy Name Phone 2018 [facility] [place], [facility] [address] Your Updated Medication List  
  
   
This list is accurate as of: 11/1/17  1:41 PM.  Always use your most recent med list.  
  
  
  
  
 hydrocortisone 2.5 % ointment Commonly known as:  HYTONE  
  
 levothyroxine 75 mcg tablet Commonly known as:  SYNTHROID Take 1 Tab by mouth Daily (before breakfast). omeprazole 40 mg capsule Commonly known as:  PRILOSEC Take 1 Cap by mouth daily for 30 days. * phentermine 37.5 mg tablet Commonly known as:  ADIPEX-P Take 37.5 mg by mouth every morning. * phentermine 37.5 mg tablet Commonly known as:  ADIPEX-P Take 1 Tab by mouth every morning for 30 days. Max Daily Amount: 37.5 mg. VENTOLIN HFA 90 mcg/actuation inhaler Generic drug:  albuterol INHALE 1 PUFF BY MOUTH EVERY 6 HOURS AS NEEDED FOR WHEEZING  
  
 * Notice: This list has 2 medication(s) that are the same as other medications prescribed for you. Read the directions carefully, and ask your doctor or other care provider to review them with you. Prescriptions Printed Refills  
 phentermine (ADIPEX-P) 37.5 mg tablet 0 Sig: Take 1 Tab by mouth every morning for 30 days. Max Daily Amount: 37.5 mg.  
 Class: Print Route: Oral  
  
We Performed the Following AMB POC EKG ROUTINE W/ 12 LEADS, INTER & REP [94143 CPT(R)] To-Do List   
 11/01/2017 Imaging:  [facility] BI SCREENING INCL CAD Introducing [facility]! Dear ALVA: Thank you for requesting a [employer] account. Our records indicate that you already have an active [employer] account. You can access your account anytime at [email or web address] Did you know that you can access your hospital and ER discharge instructions at any time in [employer]? You can also review all of your test results from your hospital stay or ER visit. Additional Information If you have questions, please visit the Frequently Asked Questions section of the [employer] website at [email or web address] Remember, [employer] is NOT to be used for urgent needs. For medical emergencies, dial 911. Now available from your iPhone and Android! Please provide this summary of care documentation to your next provider. Your primary care clinician is listed as Lynn Chavez. If you have any questions after today's visit, please call [phone].

## 2017-11-29 ENCOUNTER — OFFICE VISIT (OUTPATIENT)
Dept: INTERNAL MEDICINE CLINIC | Age: 52
End: 2017-11-29

## 2017-11-29 VITALS
TEMPERATURE: 98.5 F | RESPIRATION RATE: 20 BRPM | SYSTOLIC BLOOD PRESSURE: 118 MMHG | DIASTOLIC BLOOD PRESSURE: 86 MMHG | HEART RATE: 97 BPM | BODY MASS INDEX: 35.22 KG/M2 | HEIGHT: 67 IN | WEIGHT: 224.4 LBS | OXYGEN SATURATION: 96 %

## 2017-11-29 DIAGNOSIS — E78.2 MIXED HYPERLIPIDEMIA: ICD-10-CM

## 2017-11-29 DIAGNOSIS — E03.9 ACQUIRED HYPOTHYROIDISM: Primary | ICD-10-CM

## 2017-11-29 DIAGNOSIS — E66.9 OBESITY (BMI 30-39.9): ICD-10-CM

## 2017-11-29 DIAGNOSIS — L91.8 CUTANEOUS SKIN TAGS: ICD-10-CM

## 2017-11-29 RX ORDER — PHENTERMINE HYDROCHLORIDE 37.5 MG/1
37.5 TABLET ORAL
Qty: 30 TAB | Refills: 0 | Status: SHIPPED | OUTPATIENT
Start: 2017-11-29 | End: 2017-12-29

## 2017-11-29 NOTE — PROGRESS NOTES
Written by Lisa Bowden, as dictated by Dr. Jordan Rodriguez MD.    Melvina Garcia is a 50 y.o. female. HPI  The patient comes in today to have skin tags removed as it has been irritating neck skin area. She can`t wear any Jewelery. She is no longer taking gabapentin as it caused a lot of swelling. She has been taking phentermine for 6 weeks and has lost 20 lbs, from 244 lbs on 10/16 to 224 lbs today. She has also been following a weight watchers diet at the same time. She has stopped taking Adderall while taking phentermine. She has not been smoking. 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  hydrocortisone (HYTONE) 2.5 % ointment       levothyroxine (SYNTHROID) 75 mcg tablet Take 1 Tab by mouth Daily (before breakfast). 90 Tab 1     No current facility-administered medications on file prior to visit. Allergies   Allergen Reactions    Tylenol-Codeine #3 [Acetaminophen-Codeine] Unknown (comments)     Takes hydrocodone at home.  Has used oxycodone at well       Past Medical History:   Diagnosis Date    Arthritis     Colitis     [facility] ulcerative [facility] [address]     Ill-defined condition     ADHD    Ill-defined condition     Hep C    Thyroid disease     Ulcerative colitis ([facility] [address]        Past Surgical History:   Procedure Laterality Date    HX KNEE ARTHROSCOPY      x3- last done 9/2015 to harvest cartilage    HX ORTHOPAEDIC Left 1/14/2016    articular cartilage transplant    HX TONSILLECTOMY  1988       Family History   Problem Relation Age of Onset    Cancer Mother      melanoma    Hypertension Father     Cancer Father      melanoma    Arthritis-osteo Father     No Known Problems Sister        Social History     Social History    Marital status:      Spouse name: N/A    Number of children: N/A    Years of education: N/A     Occupational History    Not on file. Social History Main Topics    Smoking status: Former Smoker     Quit date: 11/25/2014    Smokeless tobacco: Current User      Comment: uses e cigarettes    Alcohol use No    Drug use: No    Sexual activity: No     Other Topics Concern    Not on file     Social History Narrative       Review of Systems   Constitutional: Negative for malaise/fatigue. HENT: Negative for congestion. Eyes: Negative for blurred vision and pain. Respiratory: Negative for cough and shortness of breath. Cardiovascular: Negative for chest pain and leg swelling. Gastrointestinal: Negative for abdominal pain and heartburn. Genitourinary: Negative for urgency. Musculoskeletal: Negative for joint pain and myalgias. Neurological: Negative for dizziness, sensory change and weakness. Psychiatric/Behavioral: Negative for memory loss and substance abuse. Visit Vitals    /86 (BP 1 Location: Right arm, BP Patient Position: Sitting)    Pulse 97    Temp 98.5 °F (36.9 °C) (Oral)    Resp 20    Ht 5' 7\" (1.702 m)    Wt 224 lb 6.4 oz (101.8 kg)    SpO2 96%    BMI 35.15 kg/m2       Physical Exam   Constitutional: She is oriented to person, place, and time. She appears well-developed. No distress. Obese   HENT:   Right Ear: External ear normal.   Left Ear: External ear normal.   Eyes: Conjunctivae and EOM are normal.   Neck: Normal range of motion. Neck supple. Cardiovascular: Normal rate and regular rhythm. Pulmonary/Chest: Effort normal and breath sounds normal.   Abdominal: Soft. Bowel sounds are normal. There is no tenderness. Neurological: She is alert and oriented to person, place, and time. Skin: No erythema. Skin tags on left side of neck. Psychiatric: She has a normal mood and affect. Her behavior is normal.   Nursing note and vitals reviewed. ASSESSMENT and PLAN    ICD-10-CM ICD-9-CM    1. Acquired hypothyroidism E03.9 244.9 TSH 3RD GENERATION      METABOLIC PANEL, COMPREHENSIVE    Will repeat TSH and CMP. 2. Obesity (BMI 30-39. 9) E66.9 278.00 phentermine (ADIPEX-P) 37.5 mg tablet script given to patient    Commended her on her weight loss and refilled phentermine for another month. 3. Cutaneous skin tags L91.8 701.9 REMOVAL OF SKIN TAGS    Informed consent obtained. Under aseptic measure 1% lidocaine & 1% epinephrine was injected on skin tags on L neck. Through elliptical radiofrequency electrodes at cut/coag mode skin tags were removed. No complication during procedure. 4. Mixed hyperlipidemia E78.2 272.2 LIPID PANEL    She has lost weight and she is fasted so we will recheck cholesterol today. This plan was reviewed with the patient and patient agrees. All questions were answered. This scribe documentation was reviewed by me and accurately reflects the examination and decisions made by me. This note will not be viewable in [address]

## 2017-11-29 NOTE — MR AVS SNAPSHOT
Visit Information Date & Time Provider Department Dept. Phone Encounter #  
 11/29/2017 12:15 PM Nigel Kay MD [facility] [phone] [id number] Upcoming Health Maintenance Date Due  
 PAP AKA CERVICAL CYTOLOGY 3/7/1988 FOBT Q 1 YEAR AGE 50-75 3/7/2017 BREAST CANCER SCRN MAMMOGRAM 10/16/2019 DTaP/Tdap/Td series (2 - Td) 10/16/2027 Allergies as of 11/29/2017  Review Complete On: 11/29/2017 By: Charlena Closs, LPN Severity Noted Reaction Type Reactions Tylenol-codeine #3 [Acetaminophen-codeine]  01/13/2016    Unknown (comments) Takes hydrocodone at home. Has used oxycodone at well Current Immunizations  Never Reviewed Name Date Influenza Vaccine Nadean Akhtar) 10/16/2017 Not reviewed this visit You Were Diagnosed With   
  
 Codes Comments Acquired hypothyroidism    -  Primary ICD-10-CM: E03.9 ICD-9-CM: 244.9 Obesity (BMI 30-39. 9)     ICD-10-CM: E66.9 ICD-9-CM: 278.00 Cutaneous skin tags     ICD-10-CM: L91.8 ICD-9-CM: 701.9 Mixed hyperlipidemia     ICD-10-CM: E78.2 ICD-9-CM: 272.2 Vitals BP Pulse Temp Resp Height(growth percentile) Weight(growth percentile) 118/86 (BP 1 Location: Right arm, BP Patient Position: Sitting) 97 98.5 °F (36.9 °C) (Oral) 20 5' 7\" (1.702 m) 224 lb 6.4 oz (101.8 kg) SpO2 BMI OB Status Smoking Status 96% 35.15 kg/m2 Menopause Former Smoker BMI and BSA Data Body Mass Index Body Surface Area  
 35.15 kg/m 2 2.19 m 2 Preferred Pharmacy Pharmacy Name Phone [address], [facility] [address] Your Updated Medication List  
  
   
This list is accurate as of: 11/29/17  1:13 PM.  Always use your most recent med list.  
  
  
  
  
 hydrocortisone 2.5 % ointment Commonly known as:  HYTONE  
  
 levothyroxine 75 mcg tablet Commonly known as:  SYNTHROID  
 Take 1 Tab by mouth Daily (before breakfast). omeprazole 40 mg capsule Commonly known as:  PRILOSEC  
TAKE 1 CAPSULE BY MOUTH DAILY * phentermine 37.5 mg tablet Commonly known as:  ADIPEX-P Take 37.5 mg by mouth every morning. * phentermine 37.5 mg tablet Commonly known as:  ADIPEX-P Take 1 Tab by mouth every morning for 30 days. Max Daily Amount: 37.5 mg.  
  
 * Notice: This list has 2 medication(s) that are the same as other medications prescribed for you. Read the directions carefully, and ask your doctor or other care provider to review them with you. Prescriptions Printed Refills  
 phentermine (ADIPEX-P) 37.5 mg tablet 0 Sig: Take 1 Tab by mouth every morning for 30 days. Max Daily Amount: 37.5 mg.  
 Class: Print Route: Oral  
  
We Performed the Following LIPID PANEL [50398 CPT(R)] METABOLIC PANEL, COMPREHENSIVE [90067 CPT(R)] REMOVAL OF SKIN TAGS [70056 CPT(R)] TSH 3RD GENERATION [55129 CPT(R)] Introducing [facility]! Dear Nuzhat Denton: Thank you for requesting a [employer] account. Our records indicate that you already have an active [employer] account. You can access your account anytime at [email or web address] Did you know that you can access your hospital and ER discharge instructions at any time in [employer]? You can also review all of your test results from your hospital stay or ER visit. Additional Information If you have questions, please visit the Frequently Asked Questions section of the [employer] website at [email or web address] Remember, [employer] is NOT to be used for urgent needs. For medical emergencies, dial 911. Now available from your iPhone and Android! Please provide this summary of care documentation to your next provider. Your primary care clinician is listed as James Ramirez. If you have any questions after today's visit, please call [phone].

## 2017-11-29 NOTE — PROGRESS NOTES
Patient here to be seen for skin tag removal. She has no other concerns at this time. Chief Complaint   Patient presents with    Skin Problem     skin tag removal      Visit Vitals    /86 (BP 1 Location: Right arm, BP Patient Position: Sitting)    Pulse 97    Temp 98.5 °F (36.9 °C) (Oral)    Resp 20    Ht 5' 7\" (1.702 m)    Wt 224 lb 6.4 oz (101.8 kg)    SpO2 96%    BMI 35.15 kg/m2     1. Have you been to the ER, urgent care clinic since your last visit? Hospitalized since your last visit? No    2. Have you seen or consulted any other health care providers outside of the [address] [place] [address] since your last visit? Include any pap smears or colon screening.  No

## 2017-11-30 DIAGNOSIS — E03.9 ACQUIRED HYPOTHYROIDISM: Primary | ICD-10-CM

## 2017-11-30 LAB
ALBUMIN SERPL-MCNC: 4.3 G/DL (ref 3.5–5.5)
ALBUMIN/GLOB SERPL: 1.3 {RATIO} (ref 1.2–2.2)
ALP SERPL-CCNC: 102 IU/L (ref 39–117)
ALT SERPL-CCNC: 39 IU/L (ref 0–32)
AST SERPL-CCNC: 47 IU/L (ref 0–40)
BILIRUB SERPL-MCNC: 0.6 MG/DL (ref 0–1.2)
BUN SERPL-MCNC: 9 MG/DL (ref 6–24)
BUN/CREAT SERPL: 14 (ref 9–23)
CALCIUM SERPL-MCNC: 9.2 MG/DL (ref 8.7–10.2)
CHLORIDE SERPL-SCNC: 100 MMOL/L (ref 96–106)
CHOLEST SERPL-MCNC: 164 MG/DL (ref 100–199)
CO2 SERPL-SCNC: 25 MMOL/L (ref 18–29)
CREAT SERPL-MCNC: 0.65 MG/DL (ref 0.57–1)
GFR SERPLBLD CREATININE-BSD FMLA CKD-EPI: 104 ML/MIN/1.73
GFR SERPLBLD CREATININE-BSD FMLA CKD-EPI: 120 ML/MIN/1.73
GLOBULIN SER CALC-MCNC: 3.2 G/DL (ref 1.5–4.5)
GLUCOSE SERPL-MCNC: 100 MG/DL (ref 65–99)
HDLC SERPL-MCNC: 42 MG/DL
INTERPRETATION, 910389: NORMAL
LDLC SERPL CALC-MCNC: 110 MG/DL (ref 0–99)
POTASSIUM SERPL-SCNC: 4.2 MMOL/L (ref 3.5–5.2)
PROT SERPL-MCNC: 7.5 G/DL (ref 6–8.5)
SODIUM SERPL-SCNC: 141 MMOL/L (ref 134–144)
SPECIMEN STATUS REPORT, ROLRST: NORMAL
TRIGL SERPL-MCNC: 58 MG/DL (ref 0–149)
TSH SERPL DL<=0.005 MIU/L-ACNC: 9.2 UIU/ML (ref 0.45–4.5)
VLDLC SERPL CALC-MCNC: 12 MG/DL (ref 5–40)

## 2017-11-30 RX ORDER — LEVOTHYROXINE SODIUM 112 UG/1
112 TABLET ORAL
Qty: 90 TAB | Refills: 0 | Status: SHIPPED | OUTPATIENT
Start: 2017-11-30 | End: 2017-12-27 | Stop reason: SDUPTHER

## 2017-12-01 NOTE — PROGRESS NOTES
BODØ, your TSH came back elevated. I will send a new synthroid dose to the pharmacy. Liver enzymes came back elevated as well which could be due to recent upper respiratory infection . I would suggest repeating labs in 8 weeks.

## 2017-12-27 DIAGNOSIS — R12 HEART BURN: ICD-10-CM

## 2017-12-27 DIAGNOSIS — E03.9 ACQUIRED HYPOTHYROIDISM: ICD-10-CM

## 2017-12-27 RX ORDER — OMEPRAZOLE 40 MG/1
CAPSULE, DELAYED RELEASE ORAL
Qty: 90 CAP | Refills: 2 | Status: SHIPPED | OUTPATIENT
Start: 2017-12-27 | End: 2018-12-22 | Stop reason: SDUPTHER

## 2017-12-27 RX ORDER — LEVOTHYROXINE SODIUM 112 UG/1
TABLET ORAL
Qty: 90 TAB | Refills: 0 | Status: SHIPPED | OUTPATIENT
Start: 2017-12-27 | End: 2018-02-28 | Stop reason: ALTCHOICE

## 2018-01-03 ENCOUNTER — OFFICE VISIT (OUTPATIENT)
Dept: INTERNAL MEDICINE CLINIC | Age: 53
End: 2018-01-03

## 2018-01-03 VITALS
OXYGEN SATURATION: 97 % | DIASTOLIC BLOOD PRESSURE: 76 MMHG | SYSTOLIC BLOOD PRESSURE: 124 MMHG | BODY MASS INDEX: 34.06 KG/M2 | HEART RATE: 111 BPM | TEMPERATURE: 100 F | HEIGHT: 67 IN | WEIGHT: 217 LBS | RESPIRATION RATE: 22 BRPM

## 2018-01-03 DIAGNOSIS — R06.2 WHEEZING ON AUSCULTATION: ICD-10-CM

## 2018-01-03 DIAGNOSIS — J40 BRONCHITIS: ICD-10-CM

## 2018-01-03 DIAGNOSIS — R68.89 FLU-LIKE SYMPTOMS: Primary | ICD-10-CM

## 2018-01-03 DIAGNOSIS — R05.9 COUGH: ICD-10-CM

## 2018-01-03 LAB
QUICKVUE INFLUENZA TEST: NEGATIVE
VALID INTERNAL CONTROL?: YES

## 2018-01-03 RX ORDER — AZITHROMYCIN 500 MG/1
500 TABLET, FILM COATED ORAL DAILY
Qty: 5 TAB | Refills: 0 | Status: SHIPPED | OUTPATIENT
Start: 2018-01-03 | End: 2018-01-08

## 2018-01-03 RX ORDER — METHYLPREDNISOLONE 4 MG/1
TABLET ORAL
Qty: 1 DOSE PACK | Refills: 0 | Status: SHIPPED | OUTPATIENT
Start: 2018-01-03 | End: 2018-02-28 | Stop reason: ALTCHOICE

## 2018-01-03 RX ORDER — CODEINE PHOSPHATE AND GUAIFENESIN 10; 100 MG/5ML; MG/5ML
10 SOLUTION ORAL
Qty: 118 ML | Refills: 0 | Status: SHIPPED | OUTPATIENT
Start: 2018-01-03 | End: 2018-01-08

## 2018-01-03 NOTE — PROGRESS NOTES
Pt here to be seen for cold symptoms that have not improved for 6 days, she states that she has been coughing non stop with generalized body aches. She is also having green mucous. She ran a fever for a few days, and has seen no improvement. Chief Complaint   Patient presents with    Cold Symptoms     cough      Visit Vitals    /76 (BP 1 Location: Left arm, BP Patient Position: Sitting)    Pulse (!) 111    Temp 100 °F (37.8 °C) (Oral)    Resp 22    Ht 5' 7\" (1.702 m)    Wt 217 lb (98.4 kg)    SpO2 97%    BMI 33.99 kg/m2     1. Have you been to the ER, urgent care clinic since your last visit? Hospitalized since your last visit? No    2. Have you seen or consulted any other health care providers outside of the [address] [place] [address] since your last visit? Include any pap smears or colon screening.  No

## 2018-01-03 NOTE — PROGRESS NOTES
HISTORY OF PRESENT ILLNESS  Sacha Magallanes is a 50 y.o. female. Patient seen today with cough, congestion, fever, chills and body aches. She has had symptoms for almost three to four days. Initially it started as a cough about ten days ago, then she had some congestion with symptoms starting to get worse about four to five days ago when her cough became more productive and she started having body aches. She kept taking OTC Mucinex twice a day for cough, but it was not helping. Last night she was feeling shortness of breath and chest tightness and made an appointment. In the office she has a fever today and she's also tachycardic. Current Outpatient Prescriptions   Medication Sig Dispense Refill    azithromycin (ZITHROMAX) 500 mg tab Take 1 Tab by mouth daily for 5 days. 5 Tab 0    methylPREDNISolone (MEDROL DOSEPACK) 4 mg tablet As directed. 1 Dose Pack 0    guaiFENesin-codeine (ROBITUSSIN AC) 100-10 mg/5 mL solution Take 10 mL by mouth two (2) times daily as needed for Cough for up to 5 days. Max Daily Amount: 20 mL. 118 mL 0    omeprazole (PRILOSEC) 40 mg capsule TAKE 1 CAPSULE BY MOUTH DAILY 90 Cap 2    levothyroxine (SYNTHROID) 112 mcg tablet TAKE 1 TABLET BY MOUTH DAILY BEFORE BREAKFAST 90 Tab 0    phentermine (ADIPEX-P) 37.5 mg tablet Take 37.5 mg by mouth every morning.  hydrocortisone (HYTONE) 2.5 % ointment        Past Medical History:   Diagnosis Date    Arthritis     Colitis     Colitis, ulcerative ([address]     Ill-defined condition     ADHD    Ill-defined condition     Hep C    Thyroid disease     Ulcerative colitis ([address]      Past Surgical History:   Procedure Laterality Date    HX KNEE ARTHROSCOPY      x3- last done 9/2015 to harvest cartilage    HX ORTHOPAEDIC Left 1/14/2016    articular cartilage transplant    HX TONSILLECTOMY  1988       HPI  Review of Systems   Constitutional: Positive for chills, fever and malaise/fatigue. HENT: Positive for congestion.  Negative for hearing loss. Eyes: Negative for discharge and redness. Respiratory: Positive for sputum production and shortness of breath. Negative for stridor. Cardiovascular: Positive for palpitations. Negative for chest pain and leg swelling. Gastrointestinal: Negative for abdominal pain and vomiting. Genitourinary: Negative for frequency and urgency. Musculoskeletal: Positive for joint pain and myalgias. Neurological: Positive for weakness. Physical Exam   Constitutional: She is oriented to person, place, and time. No distress. HENT:   Mouth/Throat: Oropharynx is clear and moist. No oropharyngeal exudate. Eyes: Conjunctivae are normal. Right eye exhibits no discharge. Left eye exhibits no discharge. Neck: Normal range of motion. Neck supple. Cardiovascular: Intact distal pulses. tachycardia   Pulmonary/Chest: She has wheezes. She has no rales. Coarse Rhonchi b/l   Abdominal: Soft. She exhibits no distension. There is no tenderness. Lymphadenopathy:     She has no cervical adenopathy. Neurological: She is alert and oriented to person, place, and time. No cranial nerve deficit. Skin: Skin is warm. She is not diaphoretic. No erythema. Nursing note and vitals reviewed. ASSESSMENT and PLAN    ICD-10-CM ICD-9-CM    1. Flu-like symptoms R68.89 780.99 AMB POC RAPID INFLUENZA TEST Flu test came back negative, but I told her she's already out of the window for Tamiflu, just recommended staying hydrated and having a Gatorade, chicken noodle soup and fluids with vitamin C.       2. Bronchitis J40 490 azithromycin (ZITHROMAX) 500 mg tab If no improvement she should call for a chest xray. We will send an order and she will get a chest xray done. 3. Cough R05 786.2 guaiFENesin-codeine (ROBITUSSIN AC) 100-10 mg/5 mL solution   4.  Wheezing on auscultation R06.2 786.07 methylPREDNISolone (MEDROL DOSEPACK) 4 mg tablet

## 2018-02-21 DIAGNOSIS — R79.89 ELEVATED TSH: Primary | ICD-10-CM

## 2018-02-21 DIAGNOSIS — R74.8 ELEVATED LIVER ENZYMES: ICD-10-CM

## 2018-02-22 LAB — SPECIMEN STATUS REPORT, ROLRST: NORMAL

## 2018-02-23 LAB
ALBUMIN SERPL-MCNC: 4.1 G/DL (ref 3.5–5.5)
ALBUMIN/GLOB SERPL: 1.4 {RATIO} (ref 1.2–2.2)
ALP SERPL-CCNC: 83 IU/L (ref 39–117)
ALT SERPL-CCNC: 40 IU/L (ref 0–32)
AST SERPL-CCNC: 33 IU/L (ref 0–40)
BILIRUB SERPL-MCNC: 0.3 MG/DL (ref 0–1.2)
BUN SERPL-MCNC: 11 MG/DL (ref 6–24)
BUN/CREAT SERPL: 20 (ref 9–23)
CALCIUM SERPL-MCNC: 9.3 MG/DL (ref 8.7–10.2)
CHLORIDE SERPL-SCNC: 103 MMOL/L (ref 96–106)
CO2 SERPL-SCNC: 23 MMOL/L (ref 18–29)
CREAT SERPL-MCNC: 0.54 MG/DL (ref 0.57–1)
GFR SERPLBLD CREATININE-BSD FMLA CKD-EPI: 110 ML/MIN/1.73
GFR SERPLBLD CREATININE-BSD FMLA CKD-EPI: 127 ML/MIN/1.73
GLOBULIN SER CALC-MCNC: 3 G/DL (ref 1.5–4.5)
GLUCOSE SERPL-MCNC: 114 MG/DL (ref 65–99)
POTASSIUM SERPL-SCNC: 4.1 MMOL/L (ref 3.5–5.2)
PROT SERPL-MCNC: 7.1 G/DL (ref 6–8.5)
SODIUM SERPL-SCNC: 141 MMOL/L (ref 134–144)
TSH SERPL DL<=0.005 MIU/L-ACNC: 0.2 UIU/ML (ref 0.45–4.5)

## 2018-02-23 NOTE — PROGRESS NOTES
Added Ha1c to patient labs. Also spoke with patient to let her know to schedule an appointment to be seen and transferred her up front to schedule an appointment for Next Thursday.

## 2018-02-28 ENCOUNTER — OFFICE VISIT (OUTPATIENT)
Dept: INTERNAL MEDICINE CLINIC | Age: 53
End: 2018-02-28

## 2018-02-28 VITALS
WEIGHT: 219.8 LBS | HEART RATE: 102 BPM | RESPIRATION RATE: 16 BRPM | BODY MASS INDEX: 34.5 KG/M2 | HEIGHT: 67 IN | OXYGEN SATURATION: 97 % | TEMPERATURE: 98.8 F | SYSTOLIC BLOOD PRESSURE: 136 MMHG | DIASTOLIC BLOOD PRESSURE: 88 MMHG

## 2018-02-28 DIAGNOSIS — B18.2 HEPATITIS C VIRUS CARRIER STATE (HCC): ICD-10-CM

## 2018-02-28 DIAGNOSIS — E03.9 ACQUIRED HYPOTHYROIDISM: Primary | ICD-10-CM

## 2018-02-28 DIAGNOSIS — R53.82 CHRONIC FATIGUE: ICD-10-CM

## 2018-02-28 DIAGNOSIS — R74.8 ELEVATED LIVER ENZYMES: ICD-10-CM

## 2018-02-28 DIAGNOSIS — F41.8 DEPRESSION WITH ANXIETY: ICD-10-CM

## 2018-02-28 DIAGNOSIS — R06.83 SNORES: ICD-10-CM

## 2018-02-28 RX ORDER — DEXTROAMPHETAMINE SACCHARATE, AMPHETAMINE ASPARTATE, DEXTROAMPHETAMINE SULFATE AND AMPHETAMINE SULFATE 7.5; 7.5; 7.5; 7.5 MG/1; MG/1; MG/1; MG/1
30 TABLET ORAL
COMMUNITY
End: 2020-04-07 | Stop reason: SDUPTHER

## 2018-02-28 RX ORDER — LEVOTHYROXINE SODIUM 112 UG/1
112 TABLET ORAL
Qty: 90 TAB | Refills: 0 | Status: SHIPPED | OUTPATIENT
Start: 2018-02-28 | End: 2018-06-29 | Stop reason: SDUPTHER

## 2018-02-28 RX ORDER — BUPROPION HYDROCHLORIDE 75 MG/1
75 TABLET ORAL 2 TIMES DAILY
Qty: 60 TAB | Refills: 0 | Status: SHIPPED | OUTPATIENT
Start: 2018-02-28 | End: 2018-03-30

## 2018-02-28 NOTE — PROGRESS NOTES
Written by Marixa Costa, as dictated by Dr. Lorena Valdez MD.    Michael Payne is a 50 y.o. female. HPI  The patient comes in today to discuss labs, drawn on 02/21. Her TSH was low at 0.198 and she is compliant on Synthroid 112 mcg. She admits she occasionally takes 2 tablets if she forgets to take one, or if she is unsure if she took it. She also admits she often forgets to take medications and is planning on getting a pill box to help her stay on track. Her BS was elevated at 114. HA1c was ordered which came back 5.6%. She also admits she was not fasted for labs. Her BP is high at 132/92, 136/88 on repeat. She thinks this may be because she had three cups of coffee today. She has a fhx (father) of hypertension. She states she usually has low BP, but over the last few years it has been trending higher. She has been doing Weight Watchers since 10/2017, though she is not actively using it right now and tracks her points from what she remembers from the program. She has gained 2 lbs since 01/03, from 217 lbs to 219 lbs, but overall she has lost weight from 244 lbs in 10/2017. She admits she \"cheated\" yesterday and ate out and this is what she attributes her small recent weight gain to. She has been eating healthily lately and likes making banana smoothies with skim milk and artificial sweetener, which she feels has helped with her leg pain due to the K from the bananas. She admits she is an emotional eater and she has been crying a lot lately, though she does not feel like she is depressed. She has tried Wellbutrin in the past but does not remember if  it helped her much. Her ALT was high at 40. Her liver enzymes in 11/2017 were 47 AST and 39 ALT. She has a hx of hepatitis C and is followed by Dr. Salvatore Barlow (hepatology). She was told her liver enzymes were not high enough to warrant treatment and she did not have an active infection.     She experiences brain fog and fatigue in the morning. She admits she does not sleep well and stays up late. She started taking iron supplements yesterday and feels she has more energy. 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 Allergies   Allergen Reactions    Tylenol-Codeine #3 [Acetaminophen-Codeine] Unknown (comments)     Takes hydrocodone at home. Has used oxycodone at well       Past Medical History:   Diagnosis Date    Arthritis     Colitis     [facility]     Ill-defined condition     ADHD    Ill-defined condition     Hep C    Thyroid disease     Ulcerative colitis ([facility]        Past Surgical History:   Procedure Laterality Date    HX KNEE ARTHROSCOPY      x3- last done 9/2015 to harvest cartilage    HX ORTHOPAEDIC Left 1/14/2016    articular cartilage transplant    HX TONSILLECTOMY  1988       Family History   Problem Relation Age of Onset    Cancer Mother      melanoma    Hypertension Father     Cancer Father      melanoma    Arthritis-osteo Father     No Known Problems Sister        Social History     Social History    Marital status:      Spouse name: N/A    Number of children: N/A    Years of education: N/A     Occupational History    Not on file.      Social History Main Topics    Smoking status: Former Smoker     Quit date: 11/25/2014    Smokeless tobacco: Current User      Comment: uses e cigarettes    Alcohol use No    Drug use: No    Sexual activity: No     Other Topics Concern    Not on file     Social History Narrative       Orders Only on 02/21/2018   Component Date Value Ref Range Status    Glucose 02/21/2018 114* 65 - 99 mg/dL Final    BUN 02/21/2018 11  6 - 24 mg/dL Final    Creatinine 02/21/2018 0.54* 0.57 - 1.00 mg/dL Final    GFR est non-AA 02/21/2018 110  >59 mL/min/1.73 Final    GFR est AA 02/21/2018 127  >59 mL/min/1.73 Final    BUN/Creatinine ratio 02/21/2018 20  9 - 23 Final    Sodium 02/21/2018 141  134 - 144 mmol/L Final    Potassium 02/21/2018 4.1  3.5 - 5.2 mmol/L Final    Chloride 02/21/2018 103  96 - 106 mmol/L Final    CO2 02/21/2018 23  18 - 29 mmol/L Final    Calcium 02/21/2018 9.3  8.7 - 10.2 mg/dL Final    Protein, total 02/21/2018 7.1  6.0 - 8.5 g/dL Final    Albumin 02/21/2018 4.1  3.5 - 5.5 g/dL Final    GLOBULIN, TOTAL 02/21/2018 3.0  1.5 - 4.5 g/dL Final    A-G Ratio 02/21/2018 1.4  1.2 - 2.2 Final    Bilirubin, total 02/21/2018 0.3  0.0 - 1.2 mg/dL Final    Alk.  phosphatase 02/21/2018 83  39 - 117 IU/L Final    AST (SGOT) 02/21/2018 33  0 - 40 IU/L Final    ALT (SGPT) 02/21/2018 40* 0 - 32 IU/L Final    TSH 02/21/2018 0.198* 0.450 - 4.500 uIU/mL Final   Office Visit on 01/03/2018   Component Date Value Ref Range Status    VALID INTERNAL CONTROL POC 01/03/2018 Yes   Final    QuickVue Influenza test 01/03/2018 Negative  Negative Final   Office Visit on 11/29/2017   Component Date Value Ref Range Status    TSH 11/29/2017 9.200* 0.450 - 4.500 uIU/mL Final    Glucose 11/29/2017 100* 65 - 99 mg/dL Final    BUN 11/29/2017 9  6 - 24 mg/dL Final    Creatinine 11/29/2017 0.65  0.57 - 1.00 mg/dL Final    GFR est non-AA 11/29/2017 104  >59 mL/min/1.73 Final    GFR est AA 11/29/2017 120  >59 mL/min/1.73 Final    BUN/Creatinine ratio 11/29/2017 14  9 - 23 Final    Sodium 11/29/2017 141  134 - 144 mmol/L Final    Potassium 11/29/2017 4.2  3.5 - 5.2 mmol/L Final    Chloride 11/29/2017 100  96 - 106 mmol/L Final    CO2 11/29/2017 25  18 - 29 mmol/L Final    Calcium 11/29/2017 9.2  8.7 - 10.2 mg/dL Final    Protein, total 11/29/2017 7.5  6.0 - 8.5 g/dL Final    Albumin 11/29/2017 4.3  3.5 - 5.5 g/dL Final    GLOBULIN, TOTAL 11/29/2017 3.2  1.5 - 4.5 g/dL Final    A-G Ratio 11/29/2017 1.3  1.2 - 2.2 Final    Bilirubin, total 11/29/2017 0.6  0.0 - 1.2 mg/dL Final    Alk. phosphatase 11/29/2017 102  39 - 117 IU/L Final    AST (SGOT) 11/29/2017 47* 0 - 40 IU/L Final    ALT (SGPT) 11/29/2017 39* 0 - 32 IU/L Final    Cholesterol, total 11/29/2017 164  100 - 199 mg/dL Final    Triglyceride 11/29/2017 58  0 - 149 mg/dL Final    HDL Cholesterol 11/29/2017 42  >39 mg/dL Final    VLDL, calculated 11/29/2017 12  5 - 40 mg/dL Final    LDL, calculated 11/29/2017 110* 0 - 99 mg/dL Final       Review of Systems   Constitutional: Positive for malaise/fatigue. HENT: Negative for congestion. Respiratory: Negative for cough and shortness of breath. Musculoskeletal: Negative for joint pain and myalgias. Neurological: Negative for weakness. Psychiatric/Behavioral: Positive for depression. Negative for memory loss and substance abuse. Visit Vitals    /88 (BP 1 Location: Left arm, BP Patient Position: Sitting)    Pulse (!) 102    Temp 98.8 °F (37.1 °C) (Oral)    Resp 16    Ht 5' 7\" (1.702 m)    Wt 219 lb 12.8 oz (99.7 kg)    SpO2 97%    BMI 34.43 kg/m2       Physical Exam   Constitutional: She is oriented to person, place, and time. She appears well-developed. No distress. Obese   HENT:   Right Ear: External ear normal.   Left Ear: External ear normal.   Eyes: Conjunctivae and EOM are normal. Right eye exhibits no discharge. Left eye exhibits no discharge. Neck: Normal range of motion. Neck supple. Cardiovascular: Normal rate and regular rhythm. Pulmonary/Chest: Effort normal and breath sounds normal. She has no wheezes. Abdominal: Soft.  Bowel sounds are normal. There is no tenderness. Lymphadenopathy:     She has no cervical adenopathy. Neurological: She is alert and oriented to person, place, and time. Skin: She is not diaphoretic. Psychiatric: She has a normal mood and affect. Her behavior is normal.   Nursing note and vitals reviewed. ASSESSMENT and PLAN    ICD-10-CM ICD-9-CM    1. Acquired hypothyroidism E03.9 244.9 levothyroxine (SYNTHROID) 112 mcg tablet sent to pharmacy    Discussed that her TSH is too low and can cause heart arrhythmias, and since her pulse has been trending high I want her to focus on taking her medication exactly as prescribed (once daily 30 minutes before meals) and to avoid doubling her dose. Will recheck TSH in 8 weeks and if it is still too low she will need to go down on her dose. 2. Snores R06.83 786.09 SLEEP MEDICINE REFERRAL    Referred to sleep medicine. 3. Elevated liver enzymes R74.8 790.5 HEPATITIS C QT BY PCR WITH REFLEX GENOTYPE   4. Hepatitis C virus carrier state ([facility] B18.2 V02.62 HEPATITIS C QT BY PCR WITH REFLEX GENOTYPE    Followed by hepatology. Will repeat hepatitis C panel as I discussed brain fog could be associated with hepatitis. 5. Chronic fatigue R53.82 780.79 CBC W/O DIFF    Will repeat CBC today. 6. Depression with anxiety F41.8 300.4 buPROPion (WELLBUTRIN) 75 mg tablet sent to pharmacy    I want her to try Wellbutrin again. Discussed it can help her lose weight. This plan was reviewed with the patient and patient agrees. All questions were answered. This scribe documentation was reviewed by me and accurately reflects the examination and decisions made by me. This note will not be viewable in [address]

## 2018-02-28 NOTE — MR AVS SNAPSHOT
[address] [facility] [address] [place] [address] [address] 
[phone] Patient: Gordon Cho MRN: [id number] :1965 Visit Information Date & Time Provider Department Dept. Phone Encounter #  
 2018  1:45 PM Kenya Infante, [address] [facility],[address] Internal Medicine [phone] [id number] Your Appointments 3/21/2018 11:30 AM  
Office Visit with PETER Pennington [address] [place] [address]) Appt Note: np- full skin exam  
 [facility] [address] [facility] [address]  
[address] [facility] [address] [place] [id number] Upcoming Health Maintenance Date Due  
 PAP AKA CERVICAL CYTOLOGY 3/7/1988 FOBT Q 1 YEAR AGE 50-75 3/7/2017 BREAST CANCER SCRN MAMMOGRAM 10/16/2019 DTaP/Tdap/Td series (2 - Td) 10/16/2027 Allergies as of 2018  Review Complete On: 2018 By: Kojo Luna LPN Severity Noted Reaction Type Reactions Tylenol-codeine #3 [Acetaminophen-codeine]  2016    Unknown (comments) Takes hydrocodone at home. Has used oxycodone at well Current Immunizations  Never Reviewed Name Date Influenza Vaccine Nati New) 10/16/2017 Not reviewed this visit You Were Diagnosed With   
  
 Codes Comments Acquired hypothyroidism    -  Primary ICD-10-CM: E03.9 ICD-9-CM: 244.9 Snores     ICD-10-CM: R06.83 
ICD-9-CM: 786.09 Elevated liver enzymes     ICD-10-CM: R74.8 ICD-9-CM: 790.5 Hepatitis C virus carrier state [facility])     ICD-10-CM: B18.2 ICD-9-CM: V02.62 Chronic fatigue     ICD-10-CM: R53.82 
ICD-9-CM: 780.79 Depression with anxiety     ICD-10-CM: F41.8 ICD-9-CM: 300.4 Vitals BP Pulse Temp Resp Height(growth percentile) Weight(growth percentile)  136/88 (BP 1 Location: Left arm, BP Patient Position: Sitting) (!) 102 98.8 °F (37.1 °C) (Oral) 16 5' 7\" (1.702 m) 219 lb 12.8 oz (99.7 kg) SpO2 BMI OB Status Smoking Status 97% 34.43 kg/m2 Menopause Former Smoker Vitals History BMI and BSA Data Body Mass Index Body Surface Area 34.43 kg/m 2 2.17 m 2 Preferred Pharmacy Pharmacy Name Phone [address], [address] [place] [address] [address] Your Updated Medication List  
  
   
This list is accurate as of 2/28/18  2:45 PM.  Always use your most recent med list.  
  
  
  
  
 ADDERALL 30 mg tablet Generic drug:  dextroamphetamine-amphetamine Take 30 mg by mouth. buPROPion 75 mg tablet Commonly known as:  [facility] Take 1 Tab by mouth two (2) times a day for 30 days. hydrocortisone 2.5 % ointment Commonly known as:  HYTONE  
  
 levothyroxine 112 mcg tablet Commonly known as:  SYNTHROID Take 1 Tab by mouth Daily (before breakfast) for 90 days. omeprazole 40 mg capsule Commonly known as:  PRILOSEC  
TAKE 1 CAPSULE BY MOUTH DAILY Prescriptions Sent to Pharmacy Refills  
 levothyroxine (SYNTHROID) 112 mcg tablet 0 Sig: Take 1 Tab by mouth Daily (before breakfast) for 90 days. Class: Normal  
 Pharmacy: [address] [place] [address] Ph #: [phone] Route: Oral  
 buPROPion (WELLBUTRIN) 75 mg tablet 0 Sig: Take 1 Tab by mouth two (2) times a day for 30 days. Class: Normal  
 Pharmacy: [address] [place] [address] Ph #: [phone] Route: Oral  
  
We Performed the Following CBC W/O DIFF [34850 CPT(R)] HEPATITIS C QT BY PCR WITH REFLEX GENOTYPE [[id number] Custom] SLEEP MEDICINE REFERRAL [CVL690 Custom] Referral Information  Referral ID Referred By Referred To  
  
 [id number] Sowmya PARRISH MD   
 [address] [place] [address] [address] Jessica Smith Phone: [phone] Fax: [phone] Visits Status Start Date End Date 1 New Request 2/28/18 2/28/19 If your referral has a status of pending review or denied, additional information will be sent to support the outcome of this decision. Introducing [address]! Dear Main Bourgeois: Thank you for requesting a [employer] account. Our records indicate that you already have an active [employer] account. You can access your account anytime at [email or web address] Did you know that you can access your hospital and ER discharge instructions at any time in [employer]? You can also review all of your test results from your hospital stay or ER visit. Additional Information If you have questions, please visit the Frequently Asked Questions section of the [employer] website at [email or web address] Remember, [employer] is NOT to be used for urgent needs. For medical emergencies, dial 911. Now available from your iPhone and Android! Please provide this summary of care documentation to your next provider. Your primary care clinician is listed as Rafiq Liriano. If you have any questions after today's visit, please call [phone].

## 2018-02-28 NOTE — PROGRESS NOTES
Chief Complaint   Patient presents with    Labs     Visit Vitals    BP (!) 132/92 (BP 1 Location: Left arm, BP Patient Position: Sitting)    Pulse (!) 102    Temp 98.8 °F (37.1 °C) (Oral)    Resp 16    Ht 5' 7\" (1.702 m)    Wt 219 lb 12.8 oz (99.7 kg)    SpO2 97%    BMI 34.43 kg/m2     1. Have you been to the ER, urgent care clinic since your last visit? Hospitalized since your last visit? No    2. Have you seen or consulted any other health care providers outside of the [address] [place] [address] since your last visit? Include any pap smears or colon screening. Focus MD

## 2018-03-02 LAB
ERYTHROCYTE [DISTWIDTH] IN BLOOD BY AUTOMATED COUNT: 13.4 % (ref 12.3–15.4)
HBA1C MFR BLD: 5.6 % (ref 4.8–5.6)
HCT VFR BLD AUTO: 39.9 % (ref 34–46.6)
HCV GENOTYPE: NORMAL
HCV GENTYP SERPL NAA+PROBE: NORMAL
HCV RNA SERPL NAA+PROBE-ACNC: 5320 IU/ML
HCV RNA SERPL NAA+PROBE-LOG IU: 3.73 LOG10 IU/ML
HGB BLD-MCNC: 13.6 G/DL (ref 11.1–15.9)
MCH RBC QN AUTO: 28.7 PG (ref 26.6–33)
MCHC RBC AUTO-ENTMCNC: 34.1 G/DL (ref 31.5–35.7)
MCV RBC AUTO: 84 FL (ref 79–97)
PLATELET # BLD AUTO: 283 X10E3/UL (ref 150–379)
PLEASE NOTE, 550474: NORMAL
RBC # BLD AUTO: 4.74 X10E6/UL (ref 3.77–5.28)
SPECIMEN STATUS REPORT, ROLRST: NORMAL
TEST INFORMATION: NORMAL
WBC # BLD AUTO: 7.4 X10E3/UL (ref 3.4–10.8)

## 2018-03-06 NOTE — PROGRESS NOTES
Eli Wren, your Hepatitis C level has gone up since last time. If you are feeling very tired , please make an appointment with Dr. Azeem Mcdonnell.

## 2018-03-21 ENCOUNTER — OFFICE VISIT (OUTPATIENT)
Dept: DERMATOLOGY | Facility: AMBULATORY SURGERY CENTER | Age: 53
End: 2018-03-21

## 2018-03-21 VITALS
HEIGHT: 67 IN | BODY MASS INDEX: 34.37 KG/M2 | SYSTOLIC BLOOD PRESSURE: 122 MMHG | OXYGEN SATURATION: 96 % | RESPIRATION RATE: 16 BRPM | HEART RATE: 96 BPM | WEIGHT: 219 LBS | DIASTOLIC BLOOD PRESSURE: 100 MMHG

## 2018-03-21 DIAGNOSIS — D18.01 CHERRY ANGIOMA: ICD-10-CM

## 2018-03-21 DIAGNOSIS — L91.8 SKIN TAG: ICD-10-CM

## 2018-03-21 DIAGNOSIS — L81.4 INK SPOT LENTIGO: ICD-10-CM

## 2018-03-21 DIAGNOSIS — Z80.8 FAMILY HISTORY OF MELANOMA: ICD-10-CM

## 2018-03-21 DIAGNOSIS — Z12.83 SCREENING FOR MALIGNANT NEOPLASM OF SKIN: ICD-10-CM

## 2018-03-21 DIAGNOSIS — L81.4 LENTIGINES: ICD-10-CM

## 2018-03-21 DIAGNOSIS — D22.39 FIBROUS PAPULE OF NOSE: ICD-10-CM

## 2018-03-21 DIAGNOSIS — Q82.5 VASCULAR BIRTHMARK: ICD-10-CM

## 2018-03-21 DIAGNOSIS — L57.0 ACTINIC KERATOSIS: Primary | ICD-10-CM

## 2018-03-21 DIAGNOSIS — D22.9 MULTIPLE BENIGN NEVI: ICD-10-CM

## 2018-03-21 DIAGNOSIS — L82.1 OTHER SEBORRHEIC KERATOSIS: ICD-10-CM

## 2018-03-21 NOTE — PROGRESS NOTES
Written by Yahaira Villa, as dictated by Haritha Arzola Has, [address]. Name: Camryn De La Torre       Age: 48 y.o. Date: 3/21/2018    Chief Complaint:   Chief Complaint   Patient presents with    Skin Exam       Subjective:    HPI  Ms. Camryn De La Torre is a 48 y.o. female who presents as a new patient to [facility] for a skin exam.  The patient was referred for this evaluation by Dr. Lui Zepeda. The patient has not had a skin exam in the past and the patient does have current complaints related to her skin. She reports a slow growing lesion on her left cheek present for months. The patient's pertinent skin history includes: Family history of melanoma skin cancer in her father and possible AK vs non melanoma skin cancer in her mother    ROS: Constitutional: Negative. Dermatological : positive for - skin lesion changes      Social History     Social History    Marital status:      Spouse name: N/A    Number of children: N/A    Years of education: N/A     Occupational History    Not on file.      Social History Main Topics    Smoking status: Former Smoker     Quit date: 11/25/2014    Smokeless tobacco: Current User      Comment: uses e cigarettes    Alcohol use No    Drug use: No    Sexual activity: No     Other Topics Concern    Not on file     Social History Narrative       Family History   Problem Relation Age of Onset    Cancer Mother      melanoma    Hypertension Father     Cancer Father      melanoma    Arthritis-osteo Father     No Known Problems Sister        Past Medical History:   Diagnosis Date    Arthritis     Colitis     Colitis, ulcerative ([address]     Family history of skin cancer     Ill-defined condition     ADHD    Ill-defined condition     Hep C    Skin cancer     Thyroid disease     Ulcerative colitis ([address]        Past Surgical History:   Procedure Laterality Date    HX KNEE ARTHROSCOPY      x3- last done 9/2015 to harvest cartilage    HX ORTHOPAEDIC Left 1/14/2016    articular cartilage transplant    HX TONSILLECTOMY  1988       Current Outpatient Prescriptions   Medication Sig Dispense Refill    dextroamphetamine-amphetamine (ADDERALL) 30 mg tablet Take 30 mg by mouth.  levothyroxine (SYNTHROID) 112 mcg tablet Take 1 Tab by mouth Daily (before breakfast) for 90 days. 90 Tab 0    buPROPion (WELLBUTRIN) 75 mg tablet Take 1 Tab by mouth two (2) times a day for 30 days. 60 Tab 0    omeprazole (PRILOSEC) 40 mg capsule TAKE 1 CAPSULE BY MOUTH DAILY 90 Cap 2    hydrocortisone (HYTONE) 2.5 % ointment          Allergies   Allergen Reactions    Tylenol-Codeine #3 [Acetaminophen-Codeine] Unknown (comments)     Takes hydrocodone at home. Has used oxycodone at well         Objective:    Visit Vitals    BP (!) 122/100 (BP 1 Location: Left arm, BP Patient Position: Sitting)    Pulse 96    Resp 16    Ht 5' 7\" (1.702 m)    Wt 219 lb (99.3 kg)    SpO2 96%    BMI 34.3 kg/m2       Silvina Arroyo is a 48 y.o. female who appears well and in no distress. She is awake, alert, and oriented. There is no preauricular, submandibular, or cervical lymphadenopathy. A skin examination was performed including her scalp, face (including eyelid), ears, neck, chest, back, abdomen, upper extremities (including digits/nails), lower extremities, breasts, buttocks; genital skin was not examined. She has a thin scaled actinic keratosis on her left cheek. There are pink intradermal nevi and flat brown junctional nevi, no concerning features. Some are pink and brown. She has a fibrous papule on her left ala. There are a few scattered waxy macules and keratotic papules consistent with seborrheic keratoses. There are lentigines on sun exposed areas and fair skin. She has several ink spot lentigines. She has scattered skin tags around the neck. She has scattered red papules consistent with cherry angiomas.  She has a vascular birthmark on her left thigh. Assessment/Plan:    1. Family history of melanoma skin cancer. I discussed sun protection, sunscreen use, the warning signs of skin cancer, the need for self-skin examinations, and the need for regular practitioner exams every 1 year. The patient should follow up sooner as needed if new, changing, or symptomatic skin lesions arise. 2. Actinic Keratosis, left cheek. The diagnosis of this precancerous lesion related to sun exposure was reviewed. Verbal consent was obtained. I treated 1 lesions with cryotherapy and post-cryotherapy care was reviewed. 3. Normal nevi. The diagnosis of normal nevi was reviewed. I discussed sun protection, sunscreen use, the warning signs of skin cancer, the need for self-skin examinations, and the need for regular practitioner exams every 1 year. The patient should follow up sooner as needed if new, changing, or symptomatic skin lesions arise. 4. Fibrous papule, nose. The diagnosis was dicussed and the patient was reassured. 5. Seborrheic keratoses. The diagnosis was reviewed and the patient was reassured that no treatment is needed for these benign lesions. 6. Solar lentigos. The diagnosis and relationship to sun exposure was reviewed. Sun protection advised. 7. Ink spot lentigos. The diagnosis was dicussed and the patient was reassured. 8. Skin tags. The diagnosis was dicussed and the patient was reassured. 9. Cherry angiomas. The diagnosis was reviewed and the patient was reassured that no treatment is needed for these benign lesions. 10. Vascular birthmark, left thigh. The diagnosis was dicussed and the patient was reassured. This plan was reviewed with the patient and patient agrees. All questions were answered. This scribe documentation was reviewed by me and accurately reflects the examination and decisions made by me.     [facility]   OFFICE PROCEDURE PROGRESS NOTE   Chart reviewed for the following:   Richard GONZALEZ, have reviewed the History, Physical and updated the Allergic reactions for Arliss Lively. TIME OUT performed immediately prior to start of procedure:   Magdalena GONZALEZ, have performed the following reviews on Arliss Lively   prior to the start of the procedure:     * Patient was identified by name and date of birth   * Agreement on procedure being performed was verified   * Risks and Benefits explained to the patient   * Procedure site verified and marked as necessary   * Patient was positioned for comfort   * Verbal consent was obtained    Time: 11:57 AM   Date of procedure: 3/21/2018  Procedure performed by: Ольга Valentin.  Chilango Junior DNP  Provider assisted by: self   Patient assisted by: self   How tolerated by patient: tolerated the procedure well with no complications   Comments: none

## 2018-03-21 NOTE — MR AVS SNAPSHOT
[facility] [address] 
[phone] Patient: Jean-Paul Yuen MRN: [id number] :1965 Visit Information Date & Time Provider Department Dept. Phone Encounter #  
 3/21/2018 11:30 AM PETER Chao [id number] Upcoming Health Maintenance Date Due  
 PAP AKA CERVICAL CYTOLOGY 3/7/1988 FOBT Q 1 YEAR AGE 50-75 3/7/2017 BREAST CANCER SCRN MAMMOGRAM 10/16/2019 DTaP/Tdap/Td series (2 - Td) 10/16/2027 Allergies as of 3/21/2018  Review Complete On: 3/21/2018 By: Lorri Aguiar LPN Severity Noted Reaction Type Reactions Tylenol-codeine #3 [Acetaminophen-codeine]  2016    Unknown (comments) Takes hydrocodone at home. Has used oxycodone at well Current Immunizations  Never Reviewed Name Date Influenza Vaccine Velna Oats) 10/16/2017 Not reviewed this visit Vitals BP Pulse Resp Height(growth percentile) Weight(growth percentile) SpO2  
 (!) 122/100 (BP 1 Location: Left arm, BP Patient Position: Sitting) 96 16 5' 7\" (1.702 m) 219 lb (99.3 kg) 96% BMI OB Status Smoking Status 34.3 kg/m2 Menopause Former Smoker BMI and BSA Data Body Mass Index Body Surface Area  
 34.3 kg/m 2 2.17 m 2 Preferred Pharmacy Pharmacy Name Phone [address], [address] Your Updated Medication List  
  
   
This list is accurate as of 3/21/18 11:45 AM.  Always use your most recent med list.  
  
  
  
  
 ADDERALL 30 mg tablet Generic drug:  dextroamphetamine-amphetamine Take 30 mg by mouth. buPROPion 75 mg tablet Commonly known as:  [facility] Take 1 Tab by mouth two (2) times a day for 30 days. hydrocortisone 2.5 % ointment Commonly known as:  HYTONE  
  
 levothyroxine 112 mcg tablet Commonly known as:  SYNTHROID Take 1 Tab by mouth Daily (before breakfast) for 90 days. omeprazole 40 mg capsule Commonly known as:  PRILOSEC  
TAKE 1 CAPSULE BY MOUTH DAILY Patient Instructions Self Skin Exam and Sunscreens Early detection and treatment is essential in the treatment of all forms of skin cancer. If caught early, all forms of skin cancer are curable. In addition to your regular visits, you should perform a monthly skin examination. Over time, you become familiar with what is normally found on your skin and can identify new or suspicious spots. One of the screening tools you can use to assess your skin is to follow the ABCDEs: 
 
A= Asymmetry (One half is unlike the other half) B= Border (An irregular, scalloped or poorly defined edge) C= Color (Is varied from one area to another, has shades of tan, brown/ black,       white, red or blue) D= Diameter (Spots larger than 6mm or a pencil eraser) E= Evolving (New spots or one that is changing in size, shape, or color) A follow- up interval will be customized based on your history of skin cancer or level of skin damage and risk factors. In any case, if you notice a suspicious or new spot, an appointment should be arranged between regular visits. Everyone should use sunscreen and sun-safe practices, which is especially important for those with a personal or family history of skin cancer. Suggestions for this include: 1. Use daily moisturizers containing SPF 30 or higher. 2. Wear long sleeve clothing with UPF ratings and a broad-brimmed hat. 3. Apply sunscreen with SPF 30 or higher to all sun exposed areas if you are going to be in the sun. A broad spectrum UVA/ UVB sunscreen is best.  Dont forget to REAPPLY every two hours or more often if swimming or sweating! 4. Avoid outside activities during peak sun hours, especially in the summer (10am- 2pm). 5. DO NOT use tanning beds. Using sunscreen and sun-safe practices can help reduce the likelihood of developing skin cancer or additional skin cancers in those previously diagnosed. Introducing [facility]! Dear [facility]: Thank you for requesting a [employer] account. Our records indicate that you already have an active [employer] account. You can access your account anytime at [email or web address] Did you know that you can access your hospital and ER discharge instructions at any time in [employer]? You can also review all of your test results from your hospital stay or ER visit. Additional Information If you have questions, please visit the Frequently Asked Questions section of the [employer] website at [email or web address] Remember, [employer] is NOT to be used for urgent needs. For medical emergencies, dial 911. Now available from your iPhone and Android! Please provide this summary of care documentation to your next provider. Your primary care clinician is listed as Venus Lui. If you have any questions after today's visit, please call [phone].

## 2018-06-29 DIAGNOSIS — F41.1 GENERALIZED ANXIETY DISORDER: ICD-10-CM

## 2018-06-29 DIAGNOSIS — E03.9 ACQUIRED HYPOTHYROIDISM: ICD-10-CM

## 2018-06-29 RX ORDER — BUPROPION HYDROCHLORIDE 75 MG/1
TABLET ORAL
Qty: 60 TAB | Refills: 0 | Status: SHIPPED | OUTPATIENT
Start: 2018-06-29 | End: 2018-10-17 | Stop reason: ALTCHOICE

## 2018-06-29 RX ORDER — LEVOTHYROXINE SODIUM 112 UG/1
TABLET ORAL
Qty: 90 TAB | Refills: 0 | Status: SHIPPED | OUTPATIENT
Start: 2018-06-29 | End: 2018-10-21 | Stop reason: DRUGHIGH

## 2018-07-23 RX ORDER — GABAPENTIN 300 MG/1
300 CAPSULE ORAL AS NEEDED
Qty: 30 CAP | Refills: 0 | Status: SHIPPED | OUTPATIENT
Start: 2018-07-23 | End: 2018-10-17 | Stop reason: ALTCHOICE

## 2018-07-24 DIAGNOSIS — G25.81 RESTLESS LEG SYNDROME: ICD-10-CM

## 2018-07-24 DIAGNOSIS — M79.606 PAIN OF LOWER EXTREMITY, UNSPECIFIED LATERALITY: Primary | ICD-10-CM

## 2018-07-24 RX ORDER — GABAPENTIN 300 MG/1
300 CAPSULE ORAL
Qty: 90 CAP | Refills: 0 | Status: SHIPPED | OUTPATIENT
Start: 2018-07-24 | End: 2018-10-17 | Stop reason: ALTCHOICE

## 2018-07-24 RX ORDER — GABAPENTIN 400 MG/1
800 CAPSULE ORAL
Qty: 180 CAP | Refills: 2 | OUTPATIENT
Start: 2018-07-24

## 2018-07-24 NOTE — TELEPHONE ENCOUNTER
Spoke to pharmacy per Dr. Moo Braun she filled Gabapentin 300 mg and not the 400 mg. Patient is being tapered down on this medication.

## 2018-10-17 ENCOUNTER — OFFICE VISIT (OUTPATIENT)
Dept: PRIMARY CARE CLINIC | Age: 53
End: 2018-10-17

## 2018-10-17 VITALS
HEART RATE: 103 BPM | WEIGHT: 240.6 LBS | BODY MASS INDEX: 37.76 KG/M2 | RESPIRATION RATE: 16 BRPM | DIASTOLIC BLOOD PRESSURE: 88 MMHG | HEIGHT: 67 IN | SYSTOLIC BLOOD PRESSURE: 128 MMHG | OXYGEN SATURATION: 98 % | TEMPERATURE: 98.6 F

## 2018-10-17 DIAGNOSIS — E03.9 ACQUIRED HYPOTHYROIDISM: Primary | ICD-10-CM

## 2018-10-17 DIAGNOSIS — B18.2 HEPATITIS C VIRUS CARRIER STATE (HCC): ICD-10-CM

## 2018-10-17 DIAGNOSIS — F90.0 ATTENTION DEFICIT HYPERACTIVITY DISORDER (ADHD), PREDOMINANTLY INATTENTIVE TYPE: ICD-10-CM

## 2018-10-17 DIAGNOSIS — G25.81 RESTLESS LEGS: ICD-10-CM

## 2018-10-17 DIAGNOSIS — F32.89 OTHER DEPRESSION: ICD-10-CM

## 2018-10-17 PROBLEM — E66.01 SEVERE OBESITY (HCC): Status: ACTIVE | Noted: 2018-10-17

## 2018-10-17 RX ORDER — ROPINIROLE 1 MG/1
1 TABLET, FILM COATED ORAL
Qty: 30 TAB | Refills: 0 | Status: SHIPPED | OUTPATIENT
Start: 2018-10-17 | End: 2018-11-14 | Stop reason: SDUPTHER

## 2018-10-17 RX ORDER — ALPRAZOLAM 2 MG/1
TABLET ORAL
COMMUNITY
End: 2018-10-17 | Stop reason: ALTCHOICE

## 2018-10-17 NOTE — PROGRESS NOTES
Written by Liza Bower, as dictated by Dr. Crystal Calderon MD. 
 
[address] [place] [address] Michael Pryor is a 48 y.o. female. HPI The patient presents today for a follow-up. Her hepatitis C viral load was high and she went to see a specialist, who told her that her levels were not high enough to qualify for treatment. Her viral load was 5,320 in 02/2018 which was higher than previous one. Patient would like to have her viral load checked again today. She notes that she has been experiencing a lot of fatigue, but is compliant on levothyroxine 112 mcg. She has gabapentin which has provided relief from her nerve pain from knee surgery. She does not take gabapentin daily because it causes swelling. The patient notes that she only takes gabapentin if her knee pain is keeping her from sleeping. Patient notes that she has been struggling to remember some things and experiencing \"brain fog. \" she has not tried Requip. BP is high today at 145/100, 128/88 on repeat. She notes that her BP has been high lately and she has not taken Adderall today. Patient notes that she only takes Adderall when she has to work because she does not like how it makes her feel. Followed by Dr. Roslyn Lewis, who prescribed her Adderall. She notes that she has always struggled with thinking about suicidal ideation and notes that it runs in her family. Patient notes that she will not act on these ideas. She notes that lately she has lost interest in her normal activities and notes that she plans to start working out with her friend. Patient notes that she had been spending a lot of time at [facility] because her son was recently in a head on collision on 09/18. She has been helping to provide his care at home. She has started taking a centrum multivitamin. She is reluctant to get a flu shot. Patient Active Problem List  
Diagnosis Code  ADD (attention deficit disorder) F98.8  Hypothyroidism E03.9  Hyperlipidemia E78.5  Hepatitis C B19.20  Osteoarthritis M19.90  Depression with anxiety F41.8  H/O knee surgery Z98.890  
 Osteochondritis dissecans of left knee M93.262  Severe obesity (HCC) E66.01  
  
 
Current Outpatient Medications on File Prior to Visit Medication Sig Dispense Refill  multivitamin, tx-iron-ca-min (THERA-M W/ IRON) 9 mg iron-400 mcg tab tablet Take 1 Tab by mouth daily.  levothyroxine (SYNTHROID) 112 mcg tablet TAKE 1 TABLET BY MOUTH DAILY BEFORE BREAKFAST 90 Tab 0  
 dextroamphetamine-amphetamine (ADDERALL) 30 mg tablet Take 30 mg by mouth.  omeprazole (PRILOSEC) 40 mg capsule TAKE 1 CAPSULE BY MOUTH DAILY 90 Cap 2  
 hydrocortisone (HYTONE) 2.5 % ointment No current facility-administered medications on file prior to visit. Allergies Allergen Reactions  Tylenol-Codeine #3 [Acetaminophen-Codeine] Unknown (comments) Takes hydrocodone at home. Has used oxycodone at well Past Medical History:  
Diagnosis Date  Arthritis  Colitis  Colitis, ulcerative ([address]  Family history of skin cancer  Ill-defined condition ADHD  Ill-defined condition Hep C  
 Skin cancer  Thyroid disease  Ulcerative colitis ([address] Past Surgical History:  
Procedure Laterality Date  HX KNEE ARTHROSCOPY x3- last done 9/2015 to harvest cartilage  HX ORTHOPAEDIC Left 1/14/2016  
 articular cartilage transplant [facility] Family History Problem Relation Age of Onset  Cancer Mother   
     melanoma  Hypertension Father  Cancer Father   
     melanoma  Arthritis-osteo Father  No Known Problems Sister Social History Socioeconomic History  Marital status:  Spouse name: Not on file  Number of children: Not on file  Years of education: Not on file  Highest education level: Not on file Social Needs  Financial resource strain: Not on file  Food insecurity - worry: Not on file  Food insecurity - inability: Not on file  Transportation needs - medical: Not on file  Transportation needs - non-medical: Not on file Occupational History  Not on file Tobacco Use  Smoking status: Former Smoker Last attempt to quit: 11/25/2014 Years since quitting: 3.8  Smokeless tobacco: Current User  Tobacco comment: uses e cigarettes Substance and Sexual Activity  Alcohol use: No  
 Drug use: No  
 Sexual activity: No  
Other Topics Concern  Not on file Social History Narrative  Not on file Review of Systems Constitutional: Positive for malaise/fatigue. HENT: Negative for congestion. Eyes: Negative for blurred vision and pain. Respiratory: Negative for cough and shortness of breath. Cardiovascular: Negative for chest pain and palpitations. Gastrointestinal: Negative for abdominal pain and heartburn. Genitourinary: Negative for frequency and urgency. Musculoskeletal: Positive for joint pain. Negative for myalgias. Neurological: Negative for dizziness, tingling, sensory change, weakness and headaches. Psychiatric/Behavioral: Positive for depression, memory loss and suicidal ideas. Negative for substance abuse. Visit Vitals BP (!) 145/100 (BP 1 Location: Left arm, BP Patient Position: Sitting) Pulse (!) 103 Temp 98.6 °F (37 °C) (Oral) Resp 16 Ht 5' 7\" (1.702 m) Wt 240 lb 9.6 oz (109.1 kg) SpO2 98% BMI 37.68 kg/m² Physical Exam  
Constitutional: She is oriented to person, place, and time. She appears well-developed. No distress. Obese HENT:  
Right Ear: External ear normal.  
Left Ear: External ear normal.  
Eyes: Conjunctivae and EOM are normal. Right eye exhibits no discharge. Left eye exhibits no discharge. Neck: Normal range of motion. Neck supple. Cardiovascular: Normal rate and regular rhythm.   
Pulmonary/Chest: Effort normal and breath sounds normal. She has no wheezes. Abdominal: Soft. Bowel sounds are normal. There is no tenderness. Lymphadenopathy:  
  She has no cervical adenopathy. Neurological: She is alert and oriented to person, place, and time. Skin: She is not diaphoretic. Psychiatric: She has a normal mood and affect. Her behavior is normal.  
Nursing note and vitals reviewed. ASSESSMENT and PLAN 
  ICD-10-CM ICD-9-CM 1. Acquired hypothyroidism E03.9 244.9 TSH 3RD GENERATION  
   METABOLIC PANEL, COMPREHENSIVE  
   CBC W/O DIFF 
 
TSH, CMP, and CBC ordered. Compliant on levothyroxine 112 mcg. 2. Restless legs G25.81 333.94 rOPINIRole (REQUIP) 1 mg tablet sent to pharmacy. Requip 1 mg prescribed. If 1 mg does not provide relief, she should take 2 tablets. Discussed that she can take up to 5 mg. She should not take gabapentin while she is taking Requip. 3. Attention deficit hyperactivity disorder (ADHD), predominantly inattentive type F90.0 314.00 She takes Adderall on days when she works. Told her to stay with Dr. Fer Cisse for refills. 4. Hepatitis C virus carrier state ([facility] B18.2 V02.62 HCV RNA JULIAN QUALITATIVE HCV RNA ordered. If her levels are high, I will contact her and she should make an appointment with her specialist.  
5. Other depression F32.89 311 No changes made to treatment at this time. This plan was reviewed with the patient and patient agrees. All questions were answered. This scribe documentation was reviewed by me and accurately reflects the examination and decisions made by me. This note will not be viewable in [address]

## 2018-10-17 NOTE — PROGRESS NOTES
Chief Complaint Patient presents with  Labs Visit Vitals BP (!) 145/100 (BP 1 Location: Left arm, BP Patient Position: Sitting) Pulse (!) 103 Temp 98.6 °F (37 °C) (Oral) Resp 16 Ht 5' 7\" (1.702 m) Wt 240 lb 9.6 oz (109.1 kg) SpO2 98% BMI 37.68 kg/m² 1. Have you been to the ER, urgent care clinic since your last visit? Hospitalized since your last visit?no 2. Have you seen or consulted any other health care providers outside of the [address] [place] [address] since your last visit? Include any pap smears or colon screening. ADD

## 2018-10-19 LAB
ALBUMIN SERPL-MCNC: 4.3 G/DL (ref 3.5–5.5)
ALBUMIN/GLOB SERPL: 1.5 {RATIO} (ref 1.2–2.2)
ALP SERPL-CCNC: 99 IU/L (ref 39–117)
ALT SERPL-CCNC: 18 IU/L (ref 0–32)
AST SERPL-CCNC: 21 IU/L (ref 0–40)
BILIRUB SERPL-MCNC: 0.3 MG/DL (ref 0–1.2)
BUN SERPL-MCNC: 10 MG/DL (ref 6–24)
BUN/CREAT SERPL: 16 (ref 9–23)
CALCIUM SERPL-MCNC: 9.4 MG/DL (ref 8.7–10.2)
CHLORIDE SERPL-SCNC: 100 MMOL/L (ref 96–106)
CO2 SERPL-SCNC: 26 MMOL/L (ref 20–29)
CREAT SERPL-MCNC: 0.64 MG/DL (ref 0.57–1)
ERYTHROCYTE [DISTWIDTH] IN BLOOD BY AUTOMATED COUNT: 13.9 % (ref 12.3–15.4)
GLOBULIN SER CALC-MCNC: 2.9 G/DL (ref 1.5–4.5)
GLUCOSE SERPL-MCNC: 100 MG/DL (ref 65–99)
HCT VFR BLD AUTO: 43.4 % (ref 34–46.6)
HCV RNA SERPL QL NAA+PROBE: POSITIVE
HGB BLD-MCNC: 14.6 G/DL (ref 11.1–15.9)
MCH RBC QN AUTO: 29.9 PG (ref 26.6–33)
MCHC RBC AUTO-ENTMCNC: 33.6 G/DL (ref 31.5–35.7)
MCV RBC AUTO: 89 FL (ref 79–97)
PLATELET # BLD AUTO: 285 X10E3/UL (ref 150–379)
POTASSIUM SERPL-SCNC: 4.5 MMOL/L (ref 3.5–5.2)
PROT SERPL-MCNC: 7.2 G/DL (ref 6–8.5)
RBC # BLD AUTO: 4.88 X10E6/UL (ref 3.77–5.28)
SODIUM SERPL-SCNC: 138 MMOL/L (ref 134–144)
TSH SERPL DL<=0.005 MIU/L-ACNC: 11.7 UIU/ML (ref 0.45–4.5)
WBC # BLD AUTO: 7.8 X10E3/UL (ref 3.4–10.8)

## 2018-10-21 RX ORDER — LEVOTHYROXINE SODIUM 150 UG/1
150 TABLET ORAL
Qty: 60 TAB | Refills: 0 | Status: SHIPPED | OUTPATIENT
Start: 2018-10-21 | End: 2018-12-22 | Stop reason: SDUPTHER

## 2018-10-21 NOTE — PROGRESS NOTES
Gio Resendiz, your TSH came back very high that is why you are feeling very tired. I am sending a new synthroid dose to the pharmacy ,please take it & you need a repeat TSH in 8 weeks. Please contact Dr. May Erickson office regarding your Hep C.

## 2018-11-14 DIAGNOSIS — G25.81 RESTLESS LEGS: ICD-10-CM

## 2018-11-14 DIAGNOSIS — M79.606 PAIN OF LOWER EXTREMITY, UNSPECIFIED LATERALITY: ICD-10-CM

## 2018-11-14 RX ORDER — ROPINIROLE 1 MG/1
TABLET, FILM COATED ORAL
Qty: 30 TAB | Refills: 0 | Status: SHIPPED | OUTPATIENT
Start: 2018-11-14 | End: 2018-12-22 | Stop reason: SDUPTHER

## 2018-11-14 RX ORDER — GABAPENTIN 300 MG/1
CAPSULE ORAL
Qty: 90 CAP | Refills: 0 | Status: SHIPPED | OUTPATIENT
Start: 2018-11-14 | End: 2019-11-01 | Stop reason: ALTCHOICE

## 2018-12-22 DIAGNOSIS — R12 HEART BURN: ICD-10-CM

## 2018-12-22 DIAGNOSIS — G25.81 RESTLESS LEGS: ICD-10-CM

## 2018-12-23 RX ORDER — ROPINIROLE 1 MG/1
TABLET, FILM COATED ORAL
Qty: 30 TAB | Refills: 0 | Status: SHIPPED | OUTPATIENT
Start: 2018-12-23 | End: 2019-01-30 | Stop reason: SDUPTHER

## 2018-12-23 RX ORDER — OMEPRAZOLE 40 MG/1
CAPSULE, DELAYED RELEASE ORAL
Qty: 90 CAP | Refills: 0 | Status: SHIPPED | OUTPATIENT
Start: 2018-12-23 | End: 2019-04-14 | Stop reason: SDUPTHER

## 2018-12-23 RX ORDER — LEVOTHYROXINE SODIUM 150 UG/1
TABLET ORAL
Qty: 60 TAB | Refills: 0 | Status: SHIPPED | OUTPATIENT
Start: 2018-12-23 | End: 2019-03-08 | Stop reason: SDUPTHER

## 2019-01-30 DIAGNOSIS — G25.81 RESTLESS LEGS: ICD-10-CM

## 2019-01-30 RX ORDER — ROPINIROLE 1 MG/1
TABLET, FILM COATED ORAL
Qty: 30 TAB | Refills: 0 | Status: SHIPPED | OUTPATIENT
Start: 2019-01-30 | End: 2019-03-06 | Stop reason: SDUPTHER

## 2019-03-06 DIAGNOSIS — G25.81 RESTLESS LEGS: ICD-10-CM

## 2019-03-06 RX ORDER — ROPINIROLE 1 MG/1
TABLET, FILM COATED ORAL
Qty: 30 TAB | Refills: 0 | Status: SHIPPED | OUTPATIENT
Start: 2019-03-06 | End: 2019-11-01 | Stop reason: ALTCHOICE

## 2019-03-08 RX ORDER — LEVOTHYROXINE SODIUM 150 UG/1
TABLET ORAL
Qty: 60 TAB | Refills: 0 | Status: SHIPPED | OUTPATIENT
Start: 2019-03-08 | End: 2019-05-16 | Stop reason: SDUPTHER

## 2019-04-14 DIAGNOSIS — R12 HEART BURN: ICD-10-CM

## 2019-04-14 RX ORDER — OMEPRAZOLE 40 MG/1
CAPSULE, DELAYED RELEASE ORAL
Qty: 90 CAP | Refills: 0 | Status: SHIPPED | OUTPATIENT
Start: 2019-04-14 | End: 2019-07-12 | Stop reason: SDUPTHER

## 2019-05-16 RX ORDER — LEVOTHYROXINE SODIUM 150 UG/1
TABLET ORAL
Qty: 60 TAB | Refills: 0 | Status: SHIPPED | OUTPATIENT
Start: 2019-05-16 | End: 2019-07-14 | Stop reason: SDUPTHER

## 2019-07-12 DIAGNOSIS — R12 HEART BURN: ICD-10-CM

## 2019-07-12 RX ORDER — OMEPRAZOLE 40 MG/1
CAPSULE, DELAYED RELEASE ORAL
Qty: 90 CAP | Refills: 0 | Status: SHIPPED | OUTPATIENT
Start: 2019-07-12 | End: 2019-11-27 | Stop reason: SDUPTHER

## 2019-11-01 ENCOUNTER — OFFICE VISIT (OUTPATIENT)
Dept: PRIMARY CARE CLINIC | Age: 54
End: 2019-11-01

## 2019-11-01 VITALS
DIASTOLIC BLOOD PRESSURE: 88 MMHG | HEART RATE: 102 BPM | HEIGHT: 67 IN | WEIGHT: 201.4 LBS | OXYGEN SATURATION: 94 % | TEMPERATURE: 98.1 F | BODY MASS INDEX: 31.61 KG/M2 | SYSTOLIC BLOOD PRESSURE: 136 MMHG | RESPIRATION RATE: 16 BRPM

## 2019-11-01 DIAGNOSIS — E03.9 ACQUIRED HYPOTHYROIDISM: Primary | ICD-10-CM

## 2019-11-01 DIAGNOSIS — E66.9 OBESITY (BMI 30.0-34.9): ICD-10-CM

## 2019-11-01 DIAGNOSIS — Z23 ENCOUNTER FOR IMMUNIZATION: ICD-10-CM

## 2019-11-01 DIAGNOSIS — M17.0 PRIMARY OSTEOARTHRITIS OF BOTH KNEES: ICD-10-CM

## 2019-11-01 DIAGNOSIS — K13.79 RECURRENT ORAL ULCERS: ICD-10-CM

## 2019-11-01 DIAGNOSIS — F90.1 ATTENTION DEFICIT HYPERACTIVITY DISORDER (ADHD), PREDOMINANTLY HYPERACTIVE TYPE: ICD-10-CM

## 2019-11-01 DIAGNOSIS — E78.2 MIXED HYPERLIPIDEMIA: ICD-10-CM

## 2019-11-01 DIAGNOSIS — Z72.0 TOBACCO ABUSE: ICD-10-CM

## 2019-11-01 PROBLEM — E66.01 SEVERE OBESITY (HCC): Status: RESOLVED | Noted: 2018-10-17 | Resolved: 2019-11-01

## 2019-11-01 RX ORDER — TRIAMCINOLONE ACETONIDE 1 MG/G
PASTE DENTAL 2 TIMES DAILY
Qty: 5 G | Refills: 0 | Status: SHIPPED | OUTPATIENT
Start: 2019-11-01 | End: 2019-12-01

## 2019-11-01 RX ORDER — ALPRAZOLAM 1 MG/1
1 TABLET ORAL AS NEEDED
Refills: 0 | COMMUNITY
Start: 2019-10-19 | End: 2020-04-07 | Stop reason: ALTCHOICE

## 2019-11-01 NOTE — PROGRESS NOTES
Room: 5    Identified pt with two pt identifiers(name and ). Reviewed record in preparation for visit and have obtained necessary documentation. All patient medications has been reviewed. Chief Complaint   Patient presents with    Thyroid Problem       After obtaining consent, and per verbal order from Dr. Harjit Ac, patient received Influenza vaccine given by Moreno Eller LPN in Left Deltoid. Influenza Vaccine 0.5 mL IM now. Patient was observed for 10 minutes post injection. Patient tolerated injection well. VIS given. Health Maintenance Due   Topic    PAP AKA CERVICAL CYTOLOGY     Shingrix Vaccine Age 50> (1 of 2)    FOBT Q 1 YEAR AGE 54-65     Influenza Age 5 to Adult        Vitals:    19 1559   BP: (!) 127/95   Pulse: (!) 102   Resp: 16   Temp: 98.1 °F (36.7 °C)   TempSrc: Oral   SpO2: 94%   Weight: 201 lb 6.4 oz (91.4 kg)   Height: 5' 7\" (1.702 m)   PainSc:   0 - No pain       1. Have you been to the ER, urgent care clinic since your last visit? Hospitalized since your last visit? No    2. Have you seen or consulted any other health care providers outside of the [address] [place] [address] since your last visit? Include any pap smears or colon screening. No    3. Would you like to receive your flu shot today? yes    4. Are you fasting for blood work today? NO    3) Do you have an Advanced Directive/ Living Will in place? NO  If yes, do we have a copy on file NO  If no, would you like information NO    Patient is accompanied by self I have received verbal consent from Peyton Danielle to discuss any/all medical information while they are present in the room.

## 2019-11-01 NOTE — PROGRESS NOTES
Written by Leighann Vick, as dictated by Dr. Sloan Meza MD.    Paulo Sims is a 48 y.o. female. HPI  The patient presents today for follow-up. She has lost weight. She weighs 201 lbs today and weighed 240 lbs on 10/17/18. She has been doing the Atkins/ Keto diet, and has been feeling great. She has been walking around 16 miles on the weekends because of her job. She would like to get her cholesterol and thyroid checked. She is taking Adderall 30 mg, which is prescribed by Dr. Carter Maxwell (psychology) at [facility], and uses it as she is now going to school at night during the week. She admits she is addicted to vaping, tobacco and nicotine drops, and believes that she may have oral cancer, which she would like to have checked. She does not have a dentist, as she \"doesn't have teeth,\" but is saving up to get implants. She previously tried Wellbutrin, but it made her depressed. She is interested in trying Chantix. Her knee pain has improved greatly with weight loss. She is no longer taking Gabapentin 300 mg, and reports Requip 1 mg did not help with her restless legs. BP is high today at 127/95, 136/88 on repeat. Her HR is elevated today at 102. She had her mammogram done. She is interested in getting the flu shot today. She still works at [employer], which has been very stressful, but has also started working at [employer] on the weekends. Patient Active Problem List   Diagnosis Code    ADD (attention deficit disorder) F98.8    Hypothyroidism E03.9    Hyperlipidemia E78.5    Hepatitis C B19.20    Osteoarthritis M19.90    Depression with anxiety F41.8    H/O knee surgery Z98.890    Osteochondritis dissecans of left knee M93.262        Current Outpatient Medications on File Prior to Visit   Medication Sig Dispense Refill    ALPRAZolam (XANAX) 1 mg tablet Take 1 mg by mouth as needed.   0    levothyroxine (SYNTHROID) 150 mcg tablet TAKE 1 TABLET BY MOUTH EVERY DAY BEFORE BREAKFAST 60 Tab 0    omeprazole (PRILOSEC) 40 mg capsule TAKE 1 CAPSULE BY MOUTH DAILY 90 Cap 0    dextroamphetamine-amphetamine (ADDERALL) 30 mg tablet Take 30 mg by mouth.  [DISCONTINUED] rOPINIRole (REQUIP) 1 mg tablet TAKE 1 TABLET BY MOUTH EVERY NIGHT (Patient taking differently: as needed. Indications: restless leg syndrome, an extreme discomfort in the calf muscles when sitting or lying down) 30 Tab 0    [DISCONTINUED] gabapentin (NEURONTIN) 300 mg capsule TAKE 1 CAPSULE BY MOUTH EVERY NIGHT AS NEEDED (Patient not taking: Reported on 11/1/2019) 90 Cap 0    multivitamin, tx-iron-ca-min (THERA-M W/ IRON) 9 mg iron-400 mcg tab tablet Take 1 Tab by mouth daily.  hydrocortisone (HYTONE) 2.5 % ointment        No current facility-administered medications on file prior to visit. Allergies   Allergen Reactions    Tylenol-Codeine #3 [Acetaminophen-Codeine] Unknown (comments)     Takes hydrocodone at home.  Has used oxycodone at well       Past Medical History:   Diagnosis Date    Arthritis     Colitis     [facility] ulcerative ([facility]     Family history of skin cancer     Ill-defined condition     ADHD    Ill-defined condition     Hep C    Skin cancer     Thyroid disease     Ulcerative colitis ([facility]        Past Surgical History:   Procedure Laterality Date    HX KNEE ARTHROSCOPY      x3- last done 9/2015 to harvest cartilage    HX ORTHOPAEDIC Left 1/14/2016    articular cartilage transplant    HX TONSILLECTOMY  1988       Family History   Problem Relation Age of Onset    Cancer Mother         melanoma    Hypertension Father     Cancer Father         melanoma    Arthritis-osteo Father     No Known Problems Sister        Social History     Socioeconomic History    Marital status:      Spouse name: Not on file    Number of children: Not on file    Years of education: Not on file    Highest education level: Not on file Occupational History    Not on file   Social Needs    Financial resource strain: Not on file    Food insecurity:     Worry: Not on file     Inability: Not on file    Transportation needs:     Medical: Not on file     Non-medical: Not on file   Tobacco Use    Smoking status: Former Smoker     Last attempt to quit: 2014     Years since quittin.9    Smokeless tobacco: Current User    Tobacco comment: uses e cigarettes   Substance and Sexual Activity    Alcohol use: No    Drug use: No    Sexual activity: Never   Lifestyle    Physical activity:     Days per week: Not on file     Minutes per session: Not on file    Stress: Not on file   Relationships    Social connections:     Talks on phone: Not on file     Gets together: Not on file     Attends Jehovah's witness service: Not on file     Active member of club or organization: Not on file     Attends meetings of clubs or organizations: Not on file     Relationship status: Not on file    Intimate partner violence:     Fear of current or ex partner: Not on file     Emotionally abused: Not on file     Physically abused: Not on file     Forced sexual activity: Not on file   Other Topics Concern    Not on file   Social History Narrative    Not on file       Review of Systems   Constitutional: Negative for malaise/fatigue and weight loss. HENT: Negative for congestion and hearing loss. Oral lesion   Eyes: Negative for blurred vision and photophobia. Respiratory: Negative for cough and shortness of breath. Cardiovascular: Negative for chest pain and leg swelling. Gastrointestinal: Negative for constipation, diarrhea and heartburn. Genitourinary: Negative for dysuria, frequency and urgency. Musculoskeletal: Positive for joint pain (Knee, improved). Negative for myalgias. Neurological: Negative for dizziness and headaches. Psychiatric/Behavioral: Positive for substance abuse (Tobacco, nicotine). Negative for depression.  The patient is not nervous/anxious and does not have insomnia. Visit Vitals  /88 (BP 1 Location: Left arm, BP Patient Position: Sitting)   Pulse (!) 102   Temp 98.1 °F (36.7 °C) (Oral)   Resp 16   Ht 5' 7\" (1.702 m)   Wt 201 lb 6.4 oz (91.4 kg)   SpO2 94%   BMI 31.54 kg/m²     Physical Exam   Constitutional: She is oriented to person, place, and time. She appears well-developed and well-nourished. No distress. HENT:   Head: Normocephalic and atraumatic. Right Ear: External ear normal.   Left Ear: External ear normal.   Mouth/Throat: Oral lesions (ulcer) present. Eyes: Pupils are equal, round, and reactive to light. Conjunctivae and EOM are normal. Right eye exhibits no discharge. Left eye exhibits no discharge. Neck: Normal range of motion. Neck supple. Cardiovascular: Normal rate, regular rhythm and normal heart sounds. Exam reveals no gallop and no friction rub. No murmur heard. Pulmonary/Chest: Effort normal and breath sounds normal. She has no wheezes. Abdominal: Soft. Bowel sounds are normal. There is no tenderness. Musculoskeletal: Normal range of motion. Neurological: She is alert and oriented to person, place, and time. She has normal reflexes. Skin: She is not diaphoretic. Psychiatric: She has a normal mood and affect. Her behavior is normal. Thought content normal.   Nursing note and vitals reviewed. ASSESSMENT and PLAN    ICD-10-CM ICD-9-CM    1. Acquired hypothyroidism E03.9 244.9 TSH RFX ON ABNORMAL TO FREE T4    TSH RFX on Abnormal ordered. 2. Mixed hyperlipidemia S38.5 125.5 METABOLIC PANEL, COMPREHENSIVE      CBC W/O DIFF      LIPID PANEL    CMP, CBC and Lipid Panel ordered. 3. Primary osteoarthritis of both knees M17.0 715.16 Pain has improved with weight loss. 4. Obesity (BMI 30.0-34. 9) E66.9 278.00 Commended on weight loss. Told her if cholesterol is in normal range then she continue her current diet.    5. Attention deficit hyperactivity disorder (ADHD), predominantly hyperactive type F90.1 314.01 Followed by Psychology. 6. Tobacco abuse Z72.0 305.1 Discussed cessation of nicotine lozenges and tobacco as it can cause oral cancer. 7. Recurrent oral ulcers K13.79 528.9 REFERRAL TO ORAL MAXILLOFACIAL SURGERY  triamcinolone acetonide (KENALOG) 0.1 % dental paste sent to pharmacy. Referred to Oral Maxillofacial Surgery. Kenalog 0.1 % dental paste prescribed. Potential side effects were discussed. Pt should apply to affected area BID. 8. Encounter for immunization Z23 V03.89 INFLUENZA VIRUS VAC QUAD,SPLIT,PRESV FREE SYRINGE IM    Influenza vaccine administered in office. This plan was reviewed with the patient and patient agrees. All questions were answered. This scribe documentation was reviewed by me and accurately reflects the examination and decisions made by me. This note will not be viewable in [address]

## 2019-11-05 DIAGNOSIS — E03.9 ACQUIRED HYPOTHYROIDISM: ICD-10-CM

## 2019-11-05 DIAGNOSIS — E78.2 MIXED HYPERLIPIDEMIA: ICD-10-CM

## 2019-11-06 LAB
ALBUMIN SERPL-MCNC: 4.4 G/DL (ref 3.5–5.5)
ALBUMIN/GLOB SERPL: 1.8 {RATIO} (ref 1.2–2.2)
ALP SERPL-CCNC: 77 IU/L (ref 39–117)
ALT SERPL-CCNC: 20 IU/L (ref 0–32)
AST SERPL-CCNC: 19 IU/L (ref 0–40)
BILIRUB SERPL-MCNC: 0.3 MG/DL (ref 0–1.2)
BUN SERPL-MCNC: 17 MG/DL (ref 6–24)
BUN/CREAT SERPL: 27 (ref 9–23)
CALCIUM SERPL-MCNC: 9.1 MG/DL (ref 8.7–10.2)
CHLORIDE SERPL-SCNC: 104 MMOL/L (ref 96–106)
CHOLEST SERPL-MCNC: 188 MG/DL (ref 100–199)
CO2 SERPL-SCNC: 21 MMOL/L (ref 20–29)
CREAT SERPL-MCNC: 0.62 MG/DL (ref 0.57–1)
ERYTHROCYTE [DISTWIDTH] IN BLOOD BY AUTOMATED COUNT: 13 % (ref 12.3–15.4)
GLOBULIN SER CALC-MCNC: 2.5 G/DL (ref 1.5–4.5)
GLUCOSE SERPL-MCNC: 98 MG/DL (ref 65–99)
HCT VFR BLD AUTO: 38.9 % (ref 34–46.6)
HDLC SERPL-MCNC: 52 MG/DL
HGB BLD-MCNC: 13.7 G/DL (ref 11.1–15.9)
LDLC SERPL CALC-MCNC: 112 MG/DL (ref 0–99)
MCH RBC QN AUTO: 31.1 PG (ref 26.6–33)
MCHC RBC AUTO-ENTMCNC: 35.2 G/DL (ref 31.5–35.7)
MCV RBC AUTO: 88 FL (ref 79–97)
PLATELET # BLD AUTO: 254 X10E3/UL (ref 150–450)
POTASSIUM SERPL-SCNC: 4.1 MMOL/L (ref 3.5–5.2)
PROT SERPL-MCNC: 6.9 G/DL (ref 6–8.5)
RBC # BLD AUTO: 4.41 X10E6/UL (ref 3.77–5.28)
SODIUM SERPL-SCNC: 141 MMOL/L (ref 134–144)
T4 FREE SERPL-MCNC: 1.09 NG/DL (ref 0.82–1.77)
TRIGL SERPL-MCNC: 122 MG/DL (ref 0–149)
TSH SERPL DL<=0.005 MIU/L-ACNC: 13.61 UIU/ML (ref 0.45–4.5)
VLDLC SERPL CALC-MCNC: 24 MG/DL (ref 5–40)
WBC # BLD AUTO: 6.8 X10E3/UL (ref 3.4–10.8)

## 2019-11-07 ENCOUNTER — TELEPHONE (OUTPATIENT)
Dept: PRIMARY CARE CLINIC | Age: 54
End: 2019-11-07

## 2019-11-07 DIAGNOSIS — E03.9 ACQUIRED HYPOTHYROIDISM: Primary | ICD-10-CM

## 2019-11-07 RX ORDER — LEVOTHYROXINE SODIUM 175 UG/1
175 TABLET ORAL
Qty: 90 TAB | Refills: 0 | Status: SHIPPED | OUTPATIENT
Start: 2019-11-07 | End: 2020-02-05

## 2019-11-07 NOTE — PROGRESS NOTES
Eladia Hernandez, your TSH came back very high. Are you taking synthroid daily? If yes , then we need to increase the dose.

## 2019-11-07 NOTE — PROGRESS NOTES
Attempted to call patient and phone rings and rings, no voice mail. Lab letter sent. End of encounter.

## 2019-11-07 NOTE — TELEPHONE ENCOUNTER
Patient called and stated she has been taking her thyroid medication but her levels are still high and wants to talk to a nurse.  She is unsure how to answer the message in my chart

## 2019-11-07 NOTE — TELEPHONE ENCOUNTER
Called and spoke with patient and she advises that she has been taking medication as prescribed and per directions. Advised patient she will require a medication adjustment and that Dr. Billy Magallanes will be sending a new RX to the pharmacy. Patient is agreeable to this. End of encounter.

## 2019-11-27 DIAGNOSIS — R12 HEART BURN: ICD-10-CM

## 2019-11-27 RX ORDER — OMEPRAZOLE 40 MG/1
CAPSULE, DELAYED RELEASE ORAL
Qty: 90 CAP | Refills: 0 | Status: SHIPPED | OUTPATIENT
Start: 2019-11-27 | End: 2020-08-31 | Stop reason: SDUPTHER

## 2020-03-16 ENCOUNTER — TELEPHONE (OUTPATIENT)
Dept: PRIMARY CARE CLINIC | Age: 55
End: 2020-03-16

## 2020-03-16 NOTE — TELEPHONE ENCOUNTER
Patient stated she got a call from Dr Lamar Olson at 8 this morning, my chart is not working so she can access any information.  Would like a call back

## 2020-03-18 ENCOUNTER — OFFICE VISIT (OUTPATIENT)
Dept: PRIMARY CARE CLINIC | Age: 55
End: 2020-03-18

## 2020-03-18 VITALS
RESPIRATION RATE: 8 BRPM | BODY MASS INDEX: 32.96 KG/M2 | WEIGHT: 210 LBS | DIASTOLIC BLOOD PRESSURE: 86 MMHG | TEMPERATURE: 98.6 F | HEART RATE: 109 BPM | SYSTOLIC BLOOD PRESSURE: 131 MMHG | OXYGEN SATURATION: 95 % | HEIGHT: 67 IN

## 2020-03-18 DIAGNOSIS — E03.9 ACQUIRED HYPOTHYROIDISM: Primary | ICD-10-CM

## 2020-03-18 DIAGNOSIS — L73.9 FOLLICULITIS: ICD-10-CM

## 2020-03-18 DIAGNOSIS — B18.2 HEPATITIS C VIRUS CARRIER STATE (HCC): ICD-10-CM

## 2020-03-18 DIAGNOSIS — K12.1 MOUTH ULCERS: ICD-10-CM

## 2020-03-18 DIAGNOSIS — R30.0 DYSURIA: ICD-10-CM

## 2020-03-18 DIAGNOSIS — R05.8 ALLERGIC COUGH: ICD-10-CM

## 2020-03-18 DIAGNOSIS — R35.0 FREQUENCY OF URINATION: ICD-10-CM

## 2020-03-18 LAB
BILIRUB UR QL STRIP: NEGATIVE
GLUCOSE UR-MCNC: NEGATIVE MG/DL
KETONES P FAST UR STRIP-MCNC: NEGATIVE MG/DL
PH UR STRIP: 7 [PH] (ref 4.6–8)
PROT UR QL STRIP: NEGATIVE
SP GR UR STRIP: 1.02 (ref 1–1.03)
UA UROBILINOGEN AMB POC: NORMAL (ref 0.2–1)
URINALYSIS CLARITY POC: CLEAR
URINALYSIS COLOR POC: YELLOW
URINE BLOOD POC: NORMAL
URINE LEUKOCYTES POC: NEGATIVE
URINE NITRITES POC: NEGATIVE

## 2020-03-18 RX ORDER — ALBUTEROL SULFATE 90 UG/1
1 AEROSOL, METERED RESPIRATORY (INHALATION)
Qty: 1 INHALER | Refills: 0 | Status: SHIPPED | OUTPATIENT
Start: 2020-03-18 | End: 2020-04-14

## 2020-03-18 RX ORDER — CLINDAMYCIN PHOSPHATE 10 MG/G
GEL TOPICAL 2 TIMES DAILY
Qty: 1 ML | Refills: 1 | Status: SHIPPED | OUTPATIENT
Start: 2020-03-18 | End: 2020-07-28

## 2020-03-18 RX ORDER — PHENAZOPYRIDINE HYDROCHLORIDE 200 MG/1
200 TABLET, FILM COATED ORAL
Qty: 9 TAB | Refills: 0 | Status: SHIPPED | OUTPATIENT
Start: 2020-03-18 | End: 2020-03-21

## 2020-03-18 NOTE — PROGRESS NOTES
Chief Complaint   Patient presents with    Cough     x 3 weeks, no fever, tired, rash on forearms         1. Have you been to the ER, urgent care clinic since your last visit? Hospitalized since your last visit? no    2. Have you seen or consulted any other health care providers outside of the [address] [place] [address] since your last visit? Include any pap smears or colon screening.  no

## 2020-03-18 NOTE — PROGRESS NOTES
Written by Helio Bauer, as dictated by Dr. Jordan Rodriguez MD.    Melvina Garcia is a 50 y.o. female. HPI  The patient presents today c/o cough x 3 weeks. She states that the cough has been causing sleep disturbance, and she notes that it is so severe that it makes her vomit. She had rhinorrhea previously, but this has since resolved. Denies fever. She reports that her son and friend were recently diagnosed with bronchitis. She has not tried Zyrtec or other OTC medications. She has stopped using nicotine, but notes that she would still like to have her cheeks evaluated, as there has been persistent white patches evident on the inside of her mouth, as well as soreness. She has a history of hepatitis C. She was told that her liver enzymes were increased, but has been unable to make a follow-up appointment recently with Hepatology. She reports constant fatigue. She has been compliant with Synthroid 175 mcg. She tried discontinuing the medication for one week, but noticed feeling worse, so she requested a refill and has since restarted. She also notes pruritic rash on her forearms that have been present for the past month. Denies purulent discharge. She reports some dysuria with no relief from Azo. She received the flu shot this year. 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 30 Cap 0    levothyroxine (SYNTHROID) 175 mcg tablet Take 1 Tab by mouth Daily (before breakfast) for 90 days.  90 Tab 1    omeprazole (PRILOSEC) 40 mg capsule TAKE 1 CAPSULE BY MOUTH EVERY DAY 90 Cap 0    ALPRAZolam (XANAX) 1 mg tablet Take 1 mg by mouth as needed. 0    multivitamin, tx-iron-ca-min (THERA-M W/ IRON) 9 mg iron-400 mcg tab tablet Take 1 Tab by mouth daily.  dextroamphetamine-amphetamine (ADDERALL) 30 mg tablet Take 30 mg by mouth.  hydrocortisone (HYTONE) 2.5 % ointment        No current facility-administered medications on file prior to visit. Allergies   Allergen Reactions    Tylenol-Codeine #3 [Acetaminophen-Codeine] Unknown (comments)     Takes hydrocodone at home.  Has used oxycodone at well       Past Medical History:   Diagnosis Date    Arthritis     Colitis     [facility]itis[facility] ulcerative ([facility]     Family history of skin cancer     Ill-defined condition     ADHD    Ill-defined condition     Hep C    Skin cancer     Thyroid disease     Ulcerative colitis ([facility]        Past Surgical History:   Procedure Laterality Date    HX KNEE ARTHROSCOPY      x3- last done 2015 to harvest cartilage    HX ORTHOPAEDIC Left 2016    articular cartilage transplant    HX TONSILLECTOMY  1988       Family History   Problem Relation Age of Onset    Cancer Mother         melanoma    Hypertension Father     Cancer Father         melanoma    Arthritis-osteo Father     No Known Problems Sister        Social History     Socioeconomic History    Marital status:      Spouse name: Not on file    Number of children: Not on file    Years of education: Not on file    Highest education level: Not on file   Occupational History    Not on file   Social Needs    Financial resource strain: Not on file    Food insecurity     Worry: Not on file     Inability: Not on file    Transportation needs     Medical: Not on file     Non-medical: Not on file   Tobacco Use    Smoking status: Former Smoker     Last attempt to quit: 2014     Years since quittin.3    Smokeless tobacco: Former User    Tobacco comment: uses e cigarettes Substance and Sexual Activity    Alcohol use: No    Drug use: No    Sexual activity: Never   Lifestyle    Physical activity     Days per week: Not on file     Minutes per session: Not on file    Stress: Not on file   Relationships    Social connections     Talks on phone: Not on file     Gets together: Not on file     Attends Synagogue service: Not on file     Active member of club or organization: Not on file     Attends meetings of clubs or organizations: Not on file     Relationship status: Not on file    Intimate partner violence     Fear of current or ex partner: Not on file     Emotionally abused: Not on file     Physically abused: Not on file     Forced sexual activity: Not on file   Other Topics Concern    Not on file   Social History Narrative    Not on file       Office Visit on 03/18/2020   Component Date Value Ref Range Status    Color (UA POC) 03/18/2020 Yellow   Preliminary    Clarity (UA POC) 03/18/2020 Clear   Preliminary    Glucose (UA POC) 03/18/2020 Negative  Negative Preliminary    Bilirubin (UA POC) 03/18/2020 Negative  Negative Preliminary    Ketones (UA POC) 03/18/2020 Negative  Negative Preliminary    Specific gravity (UA POC) 03/18/2020 1.025  1.001 - 1.035 Preliminary    Blood (UA POC) 03/18/2020 Trace  Negative Preliminary    pH (UA POC) 03/18/2020 7.0  4.6 - 8.0 Preliminary    Protein (UA POC) 03/18/2020 Negative  Negative Preliminary    Urobilinogen (UA POC) 03/18/2020 0.2 mg/dL  0.2 - 1 Preliminary    Nitrites (UA POC) 03/18/2020 Negative  Negative Preliminary    Leukocyte esterase (UA POC) 03/18/2020 Negative  Negative Preliminary       Review of Systems   Constitutional: Positive for malaise/fatigue. Negative for weight loss. HENT: Negative for congestion and hearing loss. Positive for buccal white patches and soreness. Eyes: Negative for blurred vision and photophobia. Respiratory: Positive for cough. Negative for shortness of breath. Cardiovascular: Negative for chest pain and leg swelling. Gastrointestinal: Negative for constipation, diarrhea and heartburn. Genitourinary: Positive for dysuria. Negative for frequency and urgency. Musculoskeletal: Negative for joint pain and myalgias. Skin: Positive for rash. Neurological: Negative for dizziness and headaches. Psychiatric/Behavioral: Negative for depression and substance abuse. The patient is not nervous/anxious and does not have insomnia. Visit Vitals  /86   Pulse (!) 109   Temp 98.6 °F (37 °C) (Oral)   Resp 8   Ht 5' 7\" (1.702 m)   Wt 210 lb (95.3 kg)   SpO2 95%   BMI 32.89 kg/m²       Physical Exam  Vitals signs and nursing note reviewed. Constitutional:       General: She is not in acute distress. Appearance: She is well-developed. She is not diaphoretic. HENT:      Head: Normocephalic and atraumatic. Right Ear: External ear normal.      Left Ear: External ear normal.   Eyes:      General:         Right eye: No discharge. Left eye: No discharge. Conjunctiva/sclera: Conjunctivae normal.      Pupils: Pupils are equal, round, and reactive to light. Neck:      Musculoskeletal: Normal range of motion and neck supple. Cardiovascular:      Rate and Rhythm: Normal rate and regular rhythm. Heart sounds: Normal heart sounds. No murmur. No friction rub. No gallop. Pulmonary:      Effort: Pulmonary effort is normal.      Breath sounds: Normal breath sounds. No wheezing. Abdominal:      General: Bowel sounds are normal.      Palpations: Abdomen is soft. Tenderness: There is no abdominal tenderness. Musculoskeletal: Normal range of motion. Skin:     Findings: Rash present. Comments: + Small, inflammatory pustules on both forearms and arms   Neurological:      Mental Status: She is alert and oriented to person, place, and time. Deep Tendon Reflexes: Reflexes are normal and symmetric.    Psychiatric:         Behavior: Behavior normal.         Thought Content: Thought content normal.       ASSESSMENT and PLAN    ICD-10-CM ICD-9-CM    1. Acquired hypothyroidism E03.9 244.9 TSH RFX ON ABNORMAL TO FREE T4    TSH ordered. 2. Frequency of urination R35.0 788.41 AMB POC URINALYSIS DIP STICK AUTO W/O MICRO    POC UA with trace blood, but otherwise unremarkable. 3. Dysuria R30.0 788. 1 CULTURE, URINE    Urine culture ordered and sent out for further evaluation. phenazopyridine (PYRIDIUM) 200 mg tablet sent to pharmacy    Pyridium 200 mg prescribed. Potential side effects were discussed. Pt should take 1 tab TID as needed for pain for up to 3 days. 4. Hep[facility] C virus carrier [facility] ([facility] B18.2 V02.62 HEPATITIS C QT BY PCR WITH REFLEX GENOTYPE      METABOLIC PANEL, COMPREHENSIVE    Hepatitis C QT and CMP ordered. REFERRAL TO LIVER HEPATOLOGY    Referred to Hepatology. 5. Mouth ulcers K12.1 528.9 REFERRAL TO ENT-OTOLARYNGOLOGY    Referred to ENT. 6. Allergic cough R05 786.2 albuterol (PROVENTIL HFA, VENTOLIN HFA, PROAIR HFA) 90 mcg/actuation inhaler sent to pharmacy    Albuterol inhaler prescribed. Potential side effects were discussed. Pt should use one puff as needed, especially at night, to help with the cough. She should also try OTC Allegra or Zyrtec. 7. Folliculitis R97.6 597.2 clindamycin (CLINDAGEL) 1 % topical gel sent to pharmacy    Clindamycin topical gel prescribed. Potential side effects were discussed. Pt should apply the gel over the affected area BID. This plan was reviewed with the patient and patient agrees. All questions were answered. This scribe documentation was reviewed by me and accurately reflects the examination and decisions made by me. This note will not be viewable in [address]

## 2020-03-18 NOTE — LETTER
NOTIFICATION RETURN TO WORK / SCHOOL 
 
3/18/2020 1:48 PM 
 
Ms. Judie Carrasco [facility] [id number] [phone] To Whom It May Concern: 
 
Judie Carrasoc is currently under the care of Jonny Vasquez. Due to her history of low immune system, I recommended that she stay home from work. If there are questions or concerns please contact our office. Sincerely, Kendall Nair MD

## 2020-03-20 LAB — BACTERIA UR CULT: NORMAL

## 2020-03-24 ENCOUNTER — TELEPHONE (OUTPATIENT)
Dept: PRIMARY CARE CLINIC | Age: 55
End: 2020-03-24

## 2020-03-24 NOTE — TELEPHONE ENCOUNTER
Called [facility] and spoke with Tomas Mireles. She checked and all has been completed just waiting on the hep c and then it will be completed and they will send results.   Hep C is still being processed

## 2020-03-24 NOTE — TELEPHONE ENCOUNTER
Called patient message left telling her that all labs will be released together, waiting on Hep C lab.
You can access the [email or web address], offered by [facility], by registering with the following website: [email or web address]

## 2020-03-24 NOTE — TELEPHONE ENCOUNTER
----- Message from Seamus Goodwin MD sent at 3/24/2020 11:48 AM EDT -----  Please call [facility] & find out about her labs . Thanks!

## 2020-03-30 ENCOUNTER — E-VISIT (OUTPATIENT)
Dept: PRIMARY CARE CLINIC | Age: 55
End: 2020-03-30

## 2020-03-30 DIAGNOSIS — R21 RASH: Primary | ICD-10-CM

## 2020-03-30 DIAGNOSIS — L29.9 ITCHING: ICD-10-CM

## 2020-03-30 RX ORDER — PERMETHRIN 50 MG/G
CREAM TOPICAL
Qty: 60 G | Refills: 0 | Status: SHIPPED | OUTPATIENT
Start: 2020-03-30 | End: 2020-07-28

## 2020-03-30 RX ORDER — HYDROXYZINE 50 MG/1
50 TABLET, FILM COATED ORAL
Qty: 10 TAB | Refills: 0 | Status: SHIPPED | OUTPATIENT
Start: 2020-03-30 | End: 2020-04-09

## 2020-03-30 NOTE — TELEPHONE ENCOUNTER
Patient thinks she has a scabies as rash is very itchy and worse at night. It has been there for 1 month      Multiple erythematous papules in both lower extremities, lower abdomen & arms. 1. Rash : Permethrin sent to the pharmacy. Will some instruction through My chart message. If no improvement in 3-5 days RTC. 2. Itching : Atarax for night time for 5 days.

## 2020-04-01 ENCOUNTER — TELEPHONE (OUTPATIENT)
Dept: PRIMARY CARE CLINIC | Age: 55
End: 2020-04-01

## 2020-04-01 NOTE — TELEPHONE ENCOUNTER
Received message from patient and called [facility] to check on her labs. Spoke with Yamini Beltran and she states that due to the over whelming need for covid testing there is a delay on resulting of labs and there is not a date of when will be completed. Hep c is why hers were not done right away.     notifid patient of the problem

## 2020-04-02 LAB
ALBUMIN SERPL-MCNC: 4 G/DL (ref 3.8–4.9)
ALBUMIN/GLOB SERPL: 1.7 {RATIO} (ref 1.2–2.2)
ALP SERPL-CCNC: 69 IU/L (ref 39–117)
ALT SERPL-CCNC: 14 IU/L (ref 0–32)
AST SERPL-CCNC: 19 IU/L (ref 0–40)
BILIRUB SERPL-MCNC: 0.3 MG/DL (ref 0–1.2)
BUN SERPL-MCNC: 11 MG/DL (ref 6–24)
BUN/CREAT SERPL: 19 (ref 9–23)
CALCIUM SERPL-MCNC: 9.1 MG/DL (ref 8.7–10.2)
CHLORIDE SERPL-SCNC: 103 MMOL/L (ref 96–106)
CO2 SERPL-SCNC: 27 MMOL/L (ref 20–29)
CREAT SERPL-MCNC: 0.59 MG/DL (ref 0.57–1)
GLOBULIN SER CALC-MCNC: 2.4 G/DL (ref 1.5–4.5)
GLUCOSE SERPL-MCNC: 104 MG/DL (ref 65–99)
HCV GENOTYPE: NORMAL
HCV RNA SERPL NAA+PROBE-ACNC: 860 IU/ML
HCV RNA SERPL NAA+PROBE-LOG IU: 2.93 LOG10 IU/ML
POTASSIUM SERPL-SCNC: 4.5 MMOL/L (ref 3.5–5.2)
PROT SERPL-MCNC: 6.4 G/DL (ref 6–8.5)
SODIUM SERPL-SCNC: 141 MMOL/L (ref 134–144)
T4 FREE SERPL-MCNC: 0.73 NG/DL (ref 0.82–1.77)
TEST INFORMATION: NORMAL
TSH SERPL DL<=0.005 MIU/L-ACNC: 12.1 UIU/ML (ref 0.45–4.5)

## 2020-04-03 NOTE — PROGRESS NOTES
Rika Tejeda, your blood work finally came back . TSH came elevated. Are you taking synthroid daily? If yes, then we need dose adjustment. Your Hep C test  showed low viral load. How is your itching and rash?

## 2020-04-07 ENCOUNTER — VIRTUAL VISIT (OUTPATIENT)
Dept: PRIMARY CARE CLINIC | Age: 55
End: 2020-04-07

## 2020-04-07 DIAGNOSIS — R21 RASH AND NONSPECIFIC SKIN ERUPTION: ICD-10-CM

## 2020-04-07 DIAGNOSIS — E03.9 ACQUIRED HYPOTHYROIDISM: Primary | ICD-10-CM

## 2020-04-07 DIAGNOSIS — F90.1 ATTENTION DEFICIT HYPERACTIVITY DISORDER (ADHD), PREDOMINANTLY HYPERACTIVE TYPE: ICD-10-CM

## 2020-04-07 DIAGNOSIS — B18.2 CHRONIC HEPATITIS C WITHOUT HEPATIC COMA (HCC): ICD-10-CM

## 2020-04-07 RX ORDER — LEVOTHYROXINE SODIUM 200 UG/1
200 TABLET ORAL
Qty: 90 TAB | Refills: 0 | Status: SHIPPED | OUTPATIENT
Start: 2020-04-07 | End: 2020-07-06

## 2020-04-07 RX ORDER — DEXTROAMPHETAMINE SACCHARATE, AMPHETAMINE ASPARTATE, DEXTROAMPHETAMINE SULFATE AND AMPHETAMINE SULFATE 7.5; 7.5; 7.5; 7.5 MG/1; MG/1; MG/1; MG/1
30 TABLET ORAL 2 TIMES DAILY
Qty: 60 TAB | Refills: 0 | Status: SHIPPED | OUTPATIENT
Start: 2020-04-07 | End: 2020-05-07

## 2020-04-07 RX ORDER — TRIAMCINOLONE ACETONIDE 1 MG/G
OINTMENT TOPICAL 2 TIMES DAILY
Qty: 30 G | Refills: 0 | Status: SHIPPED | OUTPATIENT
Start: 2020-04-07 | End: 2020-07-28

## 2020-04-07 RX ORDER — LIOTHYRONINE SODIUM 5 UG/1
5 TABLET ORAL DAILY
Qty: 90 TAB | Refills: 0 | Status: SHIPPED | OUTPATIENT
Start: 2020-04-07 | End: 2020-07-06

## 2020-04-07 NOTE — PROGRESS NOTES
Written by Cynthia Ferrari, as dictated by Dr. Wilver Gayle MD.    Tessy Polo is a 50 y.o. female. HPI  I was in the office while conducting this encounter. Consent:  She and/or her healthcare decision maker is aware that this patient-initiated Telehealth encounter is a billable service, with coverage as determined by her insurance carrier. She is aware that she may receive a bill and has provided verbal consent to proceed: Yes    This virtual visit was conducted via [address] Pursuant to the emergency declaration under the [address] waiver authority and the [employer] Act, this Virtual  Visit was conducted to reduce the patient's risk of exposure to COVID-19 and provide continuity of care for an established patient. Services were provided through a video synchronous discussion virtually to substitute for in-person clinic visit. Due to this being a TeleHealth evaluation, many elements of the physical examination are unable to be assessed. The patient presents today for a follow-up. She has been compliant on Synthroid 200 mcg since November 2019, at which time her TSH was elevated at 13.610. However, lab work performed on 03/18/2020 showed that her TSH continues to be elevated at 12.100. She notes taking the medication in the mornings daily. Hepatitis C QT was also checked on 03/18/2020, which showed a quantitation of 860 and HCV log10 of 2.934. Compared to lab work from 02/28/2018, her Hepatitis C quantitation was 5,320 with HCV log10 of 3.726. She has been told by Dr. Renea El (Hepatology) that she should continue to get her Hepatitis C QT annually. She was also advised to only start Hepatitis C treatment if her readings came back elevated, as she has been living with the disease for years. She notes that her rash is persistent and worse at night.  She reports around 30-40 marks on her arms with some improvement from permethrin topical treatment. She states that her rash continues to be erythematous and pruritic and \"looks like chicken pox\". She notes that the rash is spreading on both of her arms, but is not present on any other parts of her body. She notes that Dr. Marian Dueñas (Psychology) has been giving her Adderall 30 mg prescriptions one month in advance, but she recently accidentally threw her paper prescriptions away. As such, she requests a new prescription today. She has a follow-up appointment scheduled next month, and she has been seeing Dr. Marian Dueñas every two months regularly. Patient Active Problem List   Diagnosis Code    ADD (attention deficit disorder) F98.8    Hypothyroidism E03.9    Hyperlipidemia E78.5    Hepatitis C B19.20    Osteoarthritis M19.90    Depression with anxiety F41.8    H/O knee surgery Z98.890    Osteochondritis dissecans of left knee M93.262        Current Outpatient Medications on File Prior to Visit   Medication Sig Dispense Refill    permethrin (ACTICIN) 5 % topical cream apply sparingly as directed 60 g 0    hydrOXYzine HCL (ATARAX) 50 mg tablet Take 1 Tab by mouth nightly for 10 days. 10 Tab 0    albuterol (PROVENTIL HFA, VENTOLIN HFA, PROAIR HFA) 90 mcg/actuation inhaler Take 1 Puff by inhalation every six (6) hours as needed for Wheezing for up to 30 days. 1 Inhaler 0    clindamycin (CLINDAGEL) 1 % topical gel Apply  to affected area two (2) times a day. use thin film on affected area 1 mL 1    omeprazole (PRILOSEC) 40 mg capsule Take 1 Cap by mouth daily for 30 days. 30 Cap 0    [DISCONTINUED] levothyroxine (SYNTHROID) 175 mcg tablet Take 1 Tab by mouth Daily (before breakfast) for 90 days. 90 Tab 1    omeprazole (PRILOSEC) 40 mg capsule TAKE 1 CAPSULE BY MOUTH EVERY DAY 90 Cap 0    [DISCONTINUED] ALPRAZolam (XANAX) 1 mg tablet Take 1 mg by mouth as needed.   0    multivitamin, tx-iron-ca-min (THERA-M W/ IRON) 9 mg iron-400 mcg tab tablet Take 1 Tab by mouth daily.  [DISCONTINUED] dextroamphetamine-amphetamine (ADDERALL) 30 mg tablet Take 30 mg by mouth.  hydrocortisone (HYTONE) 2.5 % ointment        No current facility-administered medications on file prior to visit. Allergies   Allergen Reactions    Tylenol-Codeine #3 [Acetaminophen-Codeine] Unknown (comments)     Takes hydrocodone at home.  Has used oxycodone at well       Past Medical History:   Diagnosis Date    Arthritis     Colitis     [facility]ative ([facility] [address]     Family history of skin cancer     Ill-defined condition     ADHD    Ill-defined condition     Hep C    Skin cancer     Thyroid disease     Ulcerative colitis ([facility] [address]        Past Surgical History:   Procedure Laterality Date    HX KNEE ARTHROSCOPY      x3- last done 2015 to harvest cartilage    HX ORTHOPAEDIC Left 2016    articular cartilage transplant    HX TONSILLECTOMY  1988       Family History   Problem Relation Age of Onset    Cancer Mother         melanoma    Hypertension Father     Cancer Father         melanoma    Arthritis-osteo Father     No Known Problems Sister        Social History     Socioeconomic History    Marital status:      Spouse name: Not on file    Number of children: Not on file    Years of education: Not on file    Highest education level: Not on file   Occupational History    Not on file   Social Needs    Financial resource strain: Not on file    Food insecurity     Worry: Not on file     Inability: Not on file    Transportation needs     Medical: Not on file     Non-medical: Not on file   Tobacco Use    Smoking status: Former Smoker     Last attempt to quit: 2014     Years since quittin.3    Smokeless tobacco: Former User    Tobacco comment: uses e cigarettes   Substance and Sexual Activity    Alcohol use: No    Drug use: No    Sexual activity: Never   Lifestyle    Physical activity     Days per week: Not on file     Minutes per session: Not on file    Stress: Not on file   Relationships    Social connections     Talks on phone: Not on file     Gets together: Not on file     Attends [employer] service: Not on file     Active member of club or organization: Not on file     Attends meetings of clubs or organizations: Not on file     Relationship status: Not on file    Intimate partner violence     Fear of current or ex partner: Not on file     Emotionally abused: Not on file     Physically abused: Not on file     Forced sexual activity: Not on file   Other Topics Concern    Not on file   Social History Narrative    Not on file       Office Visit on 03/18/2020   Component Date Value Ref Range Status    Color (UA POC) 03/18/2020 Yellow   Preliminary    Clarity (UA POC) 03/18/2020 Clear   Preliminary    Glucose (UA POC) 03/18/2020 Negative  Negative Preliminary    Bilirubin (UA POC) 03/18/2020 Negative  Negative Preliminary    Ketones (UA POC) 03/18/2020 Negative  Negative Preliminary    Specific gravity (UA POC) 03/18/2020 1.025  1.001 - 1.035 Preliminary    Blood (UA POC) 03/18/2020 Trace  Negative Preliminary    pH (UA POC) 03/18/2020 7.0  4.6 - 8.0 Preliminary    Protein (UA POC) 03/18/2020 Negative  Negative Preliminary    Urobilinogen (UA POC) 03/18/2020 0.2 mg/dL  0.2 - 1 Preliminary    Nitrites (UA POC) 03/18/2020 Negative  Negative Preliminary    Leukocyte esterase (UA POC) 03/18/2020 Negative  Negative Preliminary    Urine Culture, Routine 03/18/2020    Final                    Value:Mixed urogenital elise  25,000-50,000 colony forming units per mL      Hepatitis C Quantitation 03/18/2020 860  IU/mL Final    HCV log10 03/18/2020 2.934  log10 IU/mL Final    TEST INFORMATION 03/18/2020 Comment   Final    The quantitative range of this assay is 15 IU/mL to 100 million IU/mL.  HCV Genotype 03/18/2020 CANCELED   Final-Edited    Result canceled by the ancillary.     Glucose 03/18/2020 104* 65 - 99 mg/dL Final    BUN 03/18/2020 11  6 - 24 mg/dL Final    Creatinine 03/18/2020 0.59  0.57 - 1.00 mg/dL Final    GFR est non-AA 03/18/2020 105  >59 mL/min/1.73 Final    GFR est AA 03/18/2020 121  >59 mL/min/1.73 Final    BUN/Creatinine ratio 03/18/2020 19  9 - 23 Final    Sodium 03/18/2020 141  134 - 144 mmol/L Final    Potassium 03/18/2020 4.5  3.5 - 5.2 mmol/L Final    Chloride 03/18/2020 103  96 - 106 mmol/L Final    CO2 03/18/2020 27  20 - 29 mmol/L Final    Calcium 03/18/2020 9.1  8.7 - 10.2 mg/dL Final    Protein, total 03/18/2020 6.4  6.0 - 8.5 g/dL Final    Albumin 03/18/2020 4.0  3.8 - 4.9 g/dL Final    GLOBULIN, TOTAL 03/18/2020 2.4  1.5 - 4.5 g/dL Final    A-G Ratio 03/18/2020 1.7  1.2 - 2.2 Final    Bilirubin, total 03/18/2020 0.3  0.0 - 1.2 mg/dL Final    Alk. phosphatase 03/18/2020 69  39 - 117 IU/L Final    AST (SGOT) 03/18/2020 19  0 - 40 IU/L Final    ALT (SGPT) 03/18/2020 14  0 - 32 IU/L Final    TSH 03/18/2020 12.100* 0.450 - 4.500 uIU/mL Final    T4,Free (Direct)^ 03/18/2020 0.73* 0.82 - 1.77 ng/dL Final       Review of Systems   Constitutional: Negative for malaise/fatigue and weight loss. HENT: Negative for congestion. Respiratory: Negative for cough and shortness of breath. Cardiovascular: Negative for chest pain and leg swelling. Gastrointestinal: Negative for constipation, diarrhea and heartburn. Musculoskeletal: Negative for joint pain and myalgias. Skin: Positive for rash. Neurological: Negative for dizziness and headaches. Psychiatric/Behavioral: Negative for depression. The patient is not nervous/anxious and does not have insomnia. There were no vitals taken for this visit. Physical Exam  Constitutional:       General: She is not in acute distress. Appearance: She is well-developed. She is not diaphoretic. HENT:      Head: Normocephalic and atraumatic. Nose: No congestion. Eyes:      General:         Right eye: No discharge. Left eye: No discharge. Conjunctiva/sclera: Conjunctivae normal.   Pulmonary:      Effort: Pulmonary effort is normal. No respiratory distress. Neurological:      Mental Status: She is alert and oriented to person, place, and time. Psychiatric:         Behavior: Behavior normal.         Thought Content: Thought content normal.       ASSESSMENT and PLAN    ICD-10-CM ICD-9-CM    1. Acquired hypothyroidism E03.9 244.9 levothyroxine (SYNTHROID) 200 mcg tablet sent to pharmacy    Synthroid 200 mcg refilled. liothyronine (CYTOMEL) 5 mcg tablet sent to pharmacy    Cytomel 5 mcg prescribed. Potential side effects were discussed. Pt should take 1 tab daily. She should also continue on Synthroid (brand name) 175 mcg and take both medications in the morning. We will plan to get repeat TSH in 6-8 weeks. She should let me know if she experiences any side effects of the medication. If so, I will refer her to Endocrinology for further evaluation, although I advised her that they are not taking any new patients at this moment in time. 2. Chronic hepatitis C without hepatic coma (HCC) B18.2 070.54 Lab work reviewed with the patient. Hepatitis C Quantitation was 860 on 03/18/2020.   3. Rash and nonspecific skin eruption R21 782.1 triamcinolone acetonide (KENALOG) 0.1 % ointment sent to pharmacy    Triamcinolone acetonide 0.1% ointment prescribed. Potential side effects were discussed. Pt should apply over the affected area. I will plan on referring her to Dermatology if her rash persists. 4. Attention deficit hyperactivity disorder (ADHD), predominantly hyperactive type F90.1 314.01 dextroamphetamine-amphetamine (AdderalL) 30 mg tablet sent to pharmacy    Adderall 30 mg refilled. Followed by Psychology. Total Time: minutes: 21 minutes. This plan was reviewed with the patient and patient agrees. All questions were answered.      This scribe documentation was reviewed by me and accurately reflects the examination and decisions made by me. This note will not be viewable in [address]

## 2020-04-14 DIAGNOSIS — R05.8 ALLERGIC COUGH: ICD-10-CM

## 2020-04-14 RX ORDER — OMEPRAZOLE 40 MG/1
CAPSULE, DELAYED RELEASE ORAL
Qty: 30 CAP | Refills: 0 | Status: SHIPPED | OUTPATIENT
Start: 2020-04-14 | End: 2020-05-17

## 2020-04-14 RX ORDER — ALBUTEROL SULFATE 90 UG/1
AEROSOL, METERED RESPIRATORY (INHALATION)
Qty: 18 G | Refills: 0 | Status: SHIPPED | OUTPATIENT
Start: 2020-04-14 | End: 2020-07-28

## 2020-05-17 RX ORDER — OMEPRAZOLE 40 MG/1
CAPSULE, DELAYED RELEASE ORAL
Qty: 30 CAP | Refills: 0 | Status: SHIPPED | OUTPATIENT
Start: 2020-05-17 | End: 2020-07-28

## 2020-06-11 ENCOUNTER — TELEPHONE (OUTPATIENT)
Dept: PRIMARY CARE CLINIC | Age: 55
End: 2020-06-11

## 2020-06-11 NOTE — TELEPHONE ENCOUNTER
Confirmed patient id and patient states that she has had abdominal pain over night. States that she has been running a fever of 99 and now it is 101. She vomited last night but is not nauseos now. States that there is a cough. Advised that she could go to the [facility] here in [place] to be tested for covid or she could go to the [facility] clinic on [facility]. She can also book an appointment with [facility] on line for covid testing. Received verbal understanding and she will keep us informed of results.

## 2020-07-23 ENCOUNTER — VIRTUAL VISIT (OUTPATIENT)
Dept: PRIMARY CARE CLINIC | Age: 55
End: 2020-07-23

## 2020-07-23 DIAGNOSIS — K80.20 GALLSTONES: Primary | ICD-10-CM

## 2020-07-23 RX ORDER — TRAMADOL HYDROCHLORIDE 50 MG/1
50 TABLET ORAL
Qty: 15 TAB | Refills: 0 | Status: SHIPPED | OUTPATIENT
Start: 2020-07-23 | End: 2020-07-26

## 2020-07-23 NOTE — PROGRESS NOTES
[facility]   [address], [address] [facility]  P: [phone]  F: [phone]    HAZEL Funk is a 50 y.o. female who is seen over telehealth for Abdominal Pain and recent ER visit at [facility] where she was diagnosed with a gallstone. She presents today to primarily discuss pain and referral options. She states she was given IV Dilaudid in the hospital, and given an Rx for Percocet to take until she sees the surgeon. She states today, however that Percocet makes her nauseous and sick. She is requesting an Rx for tramadol which she has tolerated well in the past.  She states she was referred to a surgeon with [facility] doctors, but is requesting a referral today within [facility]. She denies fevers today, and reports that her abdominal pain is improving. She was told while hospitalized that her gallstone was medium to large sized, but did not require emergent surgery.   Patient Active Problem List    Diagnosis    Osteochondritis dissecans of left knee    H/O knee surgery    Osteoarthritis    Depression with anxiety    Hypothyroidism    Hyperlipidemia    Hepatitis C    ADD (attention deficit disorder)          Past Medical History:   Diagnosis Date    Arthritis     Colitis     [address]itis, ulcerative ([address]     Family history of skin cancer     Ill-defined condition     ADHD    Ill-defined condition     Hep C    Skin cancer     Thyroid disease     Ulcerative colitis ([facility] [address]      Past Surgical History:   Procedure Laterality Date    HX KNEE ARTHROSCOPY      x3- last done 9/2015 to harvest cartilage    HX ORTHOPAEDIC Left 1/14/2016    articular cartilage transplant    HX TONSILLECTOMY  1988     Social History     Socioeconomic History    Marital status:      Spouse name: Not on file    Number of children: Not on file    Years of education: Not on file    Highest education level: Not on file   Occupational History    Not on file   Social Needs    Financial resource strain: Not on file    Food insecurity     Worry: Not on file     Inability: Not on file    Transportation needs     Medical: Not on file     Non-medical: Not on file   Tobacco Use    Smoking status: Former Smoker     Last attempt to quit: 2014     Years since quittin.6    Smokeless tobacco: Former User    Tobacco comment: uses e cigarettes   Substance and Sexual Activity    Alcohol use: No    Drug use: No    Sexual activity: Never   Lifestyle    Physical activity     Days per week: Not on file     Minutes per session: Not on file    Stress: Not on file   Relationships    Social connections     Talks on phone: Not on file     Gets together: Not on file     Attends Worship service: Not on file     Active member of club or organization: Not on file     Attends meetings of clubs or organizations: Not on file     Relationship status: Not on file    Intimate partner violence     Fear of current or ex partner: Not on file     Emotionally abused: Not on file     Physically abused: Not on file     Forced sexual activity: Not on file   Other Topics Concern    Not on file   Social History Narrative    Not on file     Family History   Problem Relation Age of Onset    Cancer Mother         melanoma    Hypertension Father     Cancer Father         melanoma    Arthritis-osteo Father     No Known Problems Sister      Allergies   Allergen Reactions    Tylenol-Codeine #3 [Acetaminophen-Codeine] Unknown (comments)     Takes hydrocodone at home. Has used oxycodone at well       Current Outpatient Medications   Medication Sig Dispense Refill    traMADoL (ULTRAM) 50 mg tablet Take 1 Tab by mouth every eight (8) hours as needed for Pain for up to 3 days.  Max Daily Amount: 150 mg. 15 Tab 0    omeprazole (PRILOSEC) 40 mg capsule TAKE 1 CAPSULE BY MOUTH DAILY 30 Cap 0    Ventolin HFA 90 mcg/actuation inhaler INHALE 1 PUFF BY MOUTH EVERY 6 HOURS AS NEEDED FOR WHEEZING 18 g 0    triamcinolone acetonide (KENALOG) 0.1 % ointment Apply  to affected area two (2) times a day. use thin layer 30 g 0    permethrin (ACTICIN) 5 % topical cream apply sparingly as directed 60 g 0    clindamycin (CLINDAGEL) 1 % topical gel Apply  to affected area two (2) times a day. use thin film on affected area 1 mL 1    omeprazole (PRILOSEC) 40 mg capsule TAKE 1 CAPSULE BY MOUTH EVERY DAY 90 Cap 0    multivitamin, tx-iron-ca-min (THERA-M W/ IRON) 9 mg iron-400 mcg tab tablet Take 1 Tab by mouth daily.  hydrocortisone (HYTONE) 2.5 % ointment              The medications were reviewed and updated in the medical record. The past medical history, past surgical history, and family history were reviewed and updated in the medical record. REVIEW OF SYSTEMS   Review of Systems   Constitutional: Negative for fever and malaise/fatigue. HENT: Negative for congestion. Eyes: Negative for blurred vision and pain. Respiratory: Negative for cough and shortness of breath. Cardiovascular: Negative for chest pain and palpitations. Gastrointestinal: Positive for abdominal pain. Negative for heartburn. Genitourinary: Negative for frequency and urgency. Musculoskeletal: Negative for joint pain and myalgias. Neurological: Negative for dizziness, tingling, sensory change, weakness and headaches. Psychiatric/Behavioral: Negative for depression, memory loss and substance abuse. PHYSICAL EXAM   NO VITALS WERE TAKEN FOR THIS VISIT  Physical Exam  Constitutional:       Appearance: Normal appearance. HENT:      Head: Normocephalic and atraumatic. Right Ear: External ear normal.      Left Ear: External ear normal.   Eyes:      Conjunctiva/sclera: Conjunctivae normal.   Pulmonary:      Effort: Pulmonary effort is normal.   Neurological:      Mental Status: She is alert and oriented to person, place, and time. Mental status is at baseline.    Psychiatric:         Speech: Speech normal. Behavior: Behavior normal. Behavior is cooperative. Thought Content: Thought content normal.       ASSESSMENT/ PLAN   Diagnoses and all orders for this visit:    1. Gallstones  -     REFERRAL TO GENERAL SURGERY  -     traMADoL (ULTRAM) 50 mg tablet; Take 1 Tab by mouth every eight (8) hours as needed for Pain for up to 3 days. Max Daily Amount: 150 mg.  -We discussed prompt follow-up with [facility] surgery, if experiencing fevers or persistent pain will need to present again to the ER. Fill tramadol Rx per her request.   -Reviewed increased hydration and a low-fat diet. I was in the office while conducting this encounter. Consent:  She and/or her healthcare decision maker is aware that this patient-initiated Telehealth encounter is a billable service, with coverage as determined by her insurance carrier. She is aware that she may receive a bill and has provided verbal consent to proceed: Yes    This virtual visit was conducted via [employer]. Pursuant to the emergency declaration under the [facility] waiver authority and the [employer] Act, this Virtual  Visit was conducted to reduce the patient's risk of exposure to COVID-19 and provide continuity of care for an established patient. Services were provided through a video synchronous discussion virtually to substitute for in-person clinic visit. Due to this being a TeleHealth evaluation, many elements of the physical examination are unable to be assessed. Total Time: minutes: 11-20 minutes. Disclaimer:  Advised patient to call back or return to office if symptoms worsen/change/persist.  Discussed expected course/resolution/complications of diagnosis in detail with patient. Medication risks/benefits/alternatives discussed with patient. Patient was given an after visit summary which includes diagnoses, current medications, & vitals. Discussed patient instructions and advised to read to all patient instructions regarding care. Patient expressed understanding with the diagnosis and plan. This note will not be viewable in [address]         Akua De La Torre NP  7/23/2020        (This document has been electronically signed)

## 2020-07-27 ENCOUNTER — OFFICE VISIT (OUTPATIENT)
Dept: SURGERY | Age: 55
End: 2020-07-27

## 2020-07-27 VITALS
TEMPERATURE: 99 F | WEIGHT: 230 LBS | HEART RATE: 110 BPM | OXYGEN SATURATION: 97 % | BODY MASS INDEX: 36.1 KG/M2 | DIASTOLIC BLOOD PRESSURE: 89 MMHG | HEIGHT: 67 IN | RESPIRATION RATE: 18 BRPM | SYSTOLIC BLOOD PRESSURE: 145 MMHG

## 2020-07-27 DIAGNOSIS — N30.90 CYSTITIS: Primary | ICD-10-CM

## 2020-07-27 DIAGNOSIS — K80.20 SYMPTOMATIC CHOLELITHIASIS: Primary | ICD-10-CM

## 2020-07-27 RX ORDER — DEXTROAMPHETAMINE SACCHARATE, AMPHETAMINE ASPARTATE, DEXTROAMPHETAMINE SULFATE AND AMPHETAMINE SULFATE 7.5; 7.5; 7.5; 7.5 MG/1; MG/1; MG/1; MG/1
30 TABLET ORAL 2 TIMES DAILY
COMMUNITY
Start: 2020-07-16

## 2020-07-27 RX ORDER — HYDROMORPHONE HYDROCHLORIDE 2 MG/1
2 TABLET ORAL
Qty: 20 TAB | Refills: 0 | Status: SHIPPED | OUTPATIENT
Start: 2020-07-27 | End: 2020-07-31 | Stop reason: SDUPTHER

## 2020-07-27 RX ORDER — CIPROFLOXACIN 500 MG/1
500 TABLET ORAL 2 TIMES DAILY
Qty: 20 TAB | Refills: 0 | Status: SHIPPED | OUTPATIENT
Start: 2020-07-27 | End: 2020-08-06

## 2020-07-27 RX ORDER — ALPRAZOLAM 1 MG/1
0.5 TABLET ORAL
COMMUNITY
Start: 2020-07-15 | End: 2020-11-06 | Stop reason: ALTCHOICE

## 2020-07-27 NOTE — PROGRESS NOTES
1. Have you been to the ER, urgent care clinic since your last visit? Hospitalized since your last visit? No    2. Have you seen or consulted any other health care providers outside of the [address] [place] [address] since your last visit? Include any pap smears or colon screening.  No

## 2020-07-27 NOTE — LETTER
7/27/20 Patient: Luis M Baptiste YOB: 1965 Date of Visit: 7/27/2020 Williams Paez MD 
[address] [place] [address] VIA In Basket Dear Williams Paez MD, Thank you for referring Ms. Nitesh Spence to [address] AT [place] [address] for evaluation. My notes for this consultation are attached. If you have questions, please do not hesitate to call me. I look forward to following your patient along with you. Sincerely, Fany Mosquera MD

## 2020-07-27 NOTE — H&P (VIEW-ONLY)
Subjective:  
  
Uzma Funk  is a 50 y.o. female who presents for evaluation of gallstones. Pt presented to ED at [facility] for abdominal pain on 20. Abdominal US at that time showed single gallstone with no evidence of cholecystitis. Pt reports she's had a total of 4 \"attacks\" of RUQ pain that radiates to her back and nausea. Past Medical History:  
Diagnosis Date  Arthritis  Colitis  Colitis, ulcerative ([facility] [address]  Family history of skin cancer  Ill-defined condition ADHD  Ill-defined condition Hep C  
 Skin cancer  Symptomatic cholelithiasis 2020  Thyroid disease  Ulcerative colitis ([facility] [address] Past Surgical History:  
Procedure Laterality Date  HX KNEE ARTHROSCOPY x3- last done 2015 to harvest cartilage  HX ORTHOPAEDIC Left 2016  
 articular cartilage transplant [facility] Social History Tobacco Use  Smoking status: Former Smoker Last attempt to quit: 2014 Years since quittin.6  Smokeless tobacco: Former User  Tobacco comment: uses e cigarettes Substance Use Topics  Alcohol use: No  
 
 
Family History Problem Relation Age of Onset  Cancer Mother   
     melanoma  Hypertension Father  Cancer Father   
     melanoma  Arthritis-osteo Father  No Known Problems Sister Current Outpatient Medications on File Prior to Visit Medication Sig Dispense Refill  dextroamphetamine-amphetamine (ADDERALL) 30 mg tablet TK 1 T PO BID  ALPRAZolam (XANAX) 1 mg tablet TK 1/2 TO 1 T PO D PRA OR SLP  [] traMADoL (ULTRAM) 50 mg tablet Take 1 Tab by mouth every eight (8) hours as needed for Pain for up to 3 days. Max Daily Amount: 150 mg. 15 Tab 0  
 omeprazole (PRILOSEC) 40 mg capsule TAKE 1 CAPSULE BY MOUTH DAILY 30 Cap 0  Ventolin HFA 90 mcg/actuation inhaler INHALE 1 PUFF BY MOUTH EVERY 6 HOURS AS NEEDED FOR WHEEZING 18 g 0  
  triamcinolone acetonide (KENALOG) 0.1 % ointment Apply  to affected area two (2) times a day. use thin layer 30 g 0  permethrin (ACTICIN) 5 % topical cream apply sparingly as directed 60 g 0  
 clindamycin (CLINDAGEL) 1 % topical gel Apply  to affected area two (2) times a day. use thin film on affected area 1 mL 1  
 omeprazole (PRILOSEC) 40 mg capsule TAKE 1 CAPSULE BY MOUTH EVERY DAY 90 Cap 0  
 multivitamin, tx-iron-ca-min (THERA-M W/ IRON) 9 mg iron-400 mcg tab tablet Take 1 Tab by mouth daily.  hydrocortisone (HYTONE) 2.5 % ointment No current facility-administered medications on file prior to visit. Allergies Allergen Reactions  Tylenol-Codeine #3 [Acetaminophen-Codeine] Unknown (comments) Takes hydrocodone at home. Has used oxycodone at well Review of Systems: A comprehensive review of systems was negative except for that written in the History of Present Illness. Objective:  
 
Visit Vitals /89 (BP 1 Location: Left arm, BP Patient Position: Sitting) Pulse (!) 110 Temp 99 °F (37.2 °C) (Oral) Resp 18 Ht 5' 7\" (1.702 m) Wt 230 lb (104.3 kg) SpO2 97% BMI 36.02 kg/m² Physical Exam: 
LUNG: clear to auscultation bilaterally HEART: regular rate and rhythm, S1, S2 normal, no murmur, click, rub or gallop ABDOMEN: Soft, moderately tender in the RUQ, no masses, no organomegaly Labs: No results found for this or any previous visit (from the past 24 hour(s)). Data Review:   
 
Abdominal US at [facility] on 07/22/20: 
IMPRESSION: Cholelithiasis.  
-Solitary sub centimeter shadowing stones in the gallbladder without evidence of cholecystitis. -Gallbladder wall is not thickened and there was no pericholecystic fluid -Technologist indicated a negative Anand's sign. -CBD measures 4 mm Assessment and Plan: ICD-10-CM ICD-9-CM 1. Symptomatic cholelithiasis  K80.20 574.20 Recommend pt have a laparoscopic cholecystectomy to be done as an outpatient. Explained how this is due to genetics and that it will likely continue to be symptomatic until the gall bladder is removed. Thoroughly explained their diagnosis, discussed the procedure, and discussed what they should expect for recovery. Advised them to take at least 3-4 days off to allow ample time for them to recover. Pt should take at least 1 week before slowly starting back into exercises. There is no surgical urgency and can be scheduled at pt's convenience. All questions were answered. They agree with this plan and will schedule this accordingly. The patient was counseled at length about the risks of rachel Covid-19 during their perioperative period and any recovery window from their procedure. The patient was made aware that rachel Covid-19  may worsen their prognosis for recovering from their procedure and lend to a higher morbidity and/or mortality risk. All material risks, benefits, and reasonable alternatives including postponing the procedure were discussed. The patient does  wish to proceed with the procedure at this time. This document was scribed by Virgie Vazquez as dictated by Dr. Wesley Newman. Signed By: Ksenia Devlin MD   
 07/27/20

## 2020-07-27 NOTE — PROGRESS NOTES
Subjective:      Mame Vicente  is a 50 y.o. female who presents for evaluation of gallstones. Pt presented to ED at [facility] for abdominal pain on 20. Abdominal US at that time showed single gallstone with no evidence of cholecystitis. Pt reports she's had a total of 4 \"attacks\" of RUQ pain that radiates to her back and nausea. Past Medical History:   Diagnosis Date    Arthritis     Colitis     [facility]itis[facility] ulcerative ([facility] [address]     Family history of skin cancer     Ill-defined condition     ADHD    Ill-defined condition     Hep C    Skin cancer     Symptomatic cholelithiasis 2020    Thyroid disease     Ulcerative colitis ([facility] [address]        Past Surgical History:   Procedure Laterality Date    HX KNEE ARTHROSCOPY      x3- last done 2015 to harvest cartilage    HX ORTHOPAEDIC Left 2016    articular cartilage transplant    HX TONSILLECTOMY  1988       Social History     Tobacco Use    Smoking status: Former Smoker     Last attempt to quit: 2014     Years since quittin.6    Smokeless tobacco: Former User    Tobacco comment: uses e cigarettes   Substance Use Topics    Alcohol use: No       Family History   Problem Relation Age of Onset    Cancer Mother         melanoma    Hypertension Father     Cancer Father         melanoma    Arthritis-osteo Father     No Known Problems Sister        Current Outpatient Medications on File Prior to Visit   Medication Sig Dispense Refill    dextroamphetamine-amphetamine (ADDERALL) 30 mg tablet TK 1 T PO BID      ALPRAZolam (XANAX) 1 mg tablet TK 1/2 TO 1 T PO D PRA OR SLP      [] traMADoL (ULTRAM) 50 mg tablet Take 1 Tab by mouth every eight (8) hours as needed for Pain for up to 3 days.  Max Daily Amount: 150 mg. 15 Tab 0    omeprazole (PRILOSEC) 40 mg capsule TAKE 1 CAPSULE BY MOUTH DAILY 30 Cap 0    Ventolin HFA 90 mcg/actuation inhaler INHALE 1 PUFF BY MOUTH EVERY 6 HOURS AS NEEDED FOR WHEEZING 18 g 0    triamcinolone acetonide (KENALOG) 0.1 % ointment Apply  to affected area two (2) times a day. use thin layer 30 g 0    permethrin (ACTICIN) 5 % topical cream apply sparingly as directed 60 g 0    clindamycin (CLINDAGEL) 1 % topical gel Apply  to affected area two (2) times a day. use thin film on affected area 1 mL 1    omeprazole (PRILOSEC) 40 mg capsule TAKE 1 CAPSULE BY MOUTH EVERY DAY 90 Cap 0    multivitamin, tx-iron-ca-min (THERA-M W/ IRON) 9 mg iron-400 mcg tab tablet Take 1 Tab by mouth daily.  hydrocortisone (HYTONE) 2.5 % ointment        No current facility-administered medications on file prior to visit. Allergies   Allergen Reactions    Tylenol-Codeine #3 [Acetaminophen-Codeine] Unknown (comments)     Takes hydrocodone at home. Has used oxycodone at well         Review of Systems:    A comprehensive review of systems was negative except for that written in the History of Present Illness. Objective:     Visit Vitals  /89 (BP 1 Location: Left arm, BP Patient Position: Sitting)   Pulse (!) 110   Temp 99 °F (37.2 °C) (Oral)   Resp 18   Ht 5' 7\" (1.702 m)   Wt 230 lb (104.3 kg)   SpO2 97%   BMI 36.02 kg/m²        Physical Exam:  LUNG: clear to auscultation bilaterally  HEART: regular rate and rhythm, S1, S2 normal, no murmur, click, rub or gallop  ABDOMEN: Soft, moderately tender in the RUQ, no masses, no organomegaly    Labs: No results found for this or any previous visit (from the past 24 hour(s)). Data Review:      Abdominal US at [facility] on 07/22/20:  IMPRESSION: Cholelithiasis.   -Solitary sub centimeter shadowing stones in the gallbladder without evidence of cholecystitis. -Gallbladder wall is not thickened and there was no pericholecystic fluid   -Technologist indicated a negative Anand's sign. -CBD measures 4 mm     Assessment and Plan:       ICD-10-CM ICD-9-CM    1.  Symptomatic cholelithiasis  K80.20 574.20        Recommend pt have a laparoscopic cholecystectomy to be done as an outpatient. Explained how this is due to genetics and that it will likely continue to be symptomatic until the gall bladder is removed. Thoroughly explained their diagnosis, discussed the procedure, and discussed what they should expect for recovery. Advised them to take at least 3-4 days off to allow ample time for them to recover. Pt should take at least 1 week before slowly starting back into exercises. There is no surgical urgency and can be scheduled at pt's convenience. All questions were answered. They agree with this plan and will schedule this accordingly. The patient was counseled at length about the risks of rachel Covid-19 during their perioperative period and any recovery window from their procedure. The patient was made aware that rachel Covid-19  may worsen their prognosis for recovering from their procedure and lend to a higher morbidity and/or mortality risk. All material risks, benefits, and reasonable alternatives including postponing the procedure were discussed. The patient does  wish to proceed with the procedure at this time. This document was scribed by Joel Turk as dictated by Dr. Jero Hutchins.      Signed By: Kane Underwood MD     07/27/20

## 2020-07-28 ENCOUNTER — HOSPITAL ENCOUNTER (OUTPATIENT)
Dept: PREADMISSION TESTING | Age: 55
Discharge: HOME | End: 2020-07-28
Payer: COMMERCIAL

## 2020-07-28 ENCOUNTER — DOCUMENTATION ONLY (OUTPATIENT)
Dept: SURGERY | Age: 55
End: 2020-07-28

## 2020-07-28 VITALS
HEIGHT: 67 IN | WEIGHT: 227.96 LBS | RESPIRATION RATE: 18 BRPM | SYSTOLIC BLOOD PRESSURE: 128 MMHG | TEMPERATURE: 98.6 F | HEART RATE: 103 BPM | BODY MASS INDEX: 35.78 KG/M2 | DIASTOLIC BLOOD PRESSURE: 88 MMHG

## 2020-07-28 LAB
ALBUMIN SERPL-MCNC: 3.6 G/DL (ref 3.5–5)
ALBUMIN/GLOB SERPL: 1.1 {RATIO} (ref 1.1–2.2)
ALP SERPL-CCNC: 79 U/L (ref 45–117)
ALT SERPL-CCNC: 28 U/L (ref 12–78)
ANION GAP SERPL CALC-SCNC: 5 MMOL/L (ref 5–15)
APTT PPP: 27.9 SEC (ref 22.1–32)
AST SERPL-CCNC: 18 U/L (ref 15–37)
BASOPHILS # BLD: 0.1 K/UL (ref 0–0.1)
BASOPHILS NFR BLD: 1 % (ref 0–1)
BILIRUB SERPL-MCNC: 0.4 MG/DL (ref 0.2–1)
BUN SERPL-MCNC: 15 MG/DL (ref 6–20)
BUN/CREAT SERPL: 20 (ref 12–20)
CALCIUM SERPL-MCNC: 8.4 MG/DL (ref 8.5–10.1)
CHLORIDE SERPL-SCNC: 102 MMOL/L (ref 97–108)
CO2 SERPL-SCNC: 28 MMOL/L (ref 21–32)
CREAT SERPL-MCNC: 0.75 MG/DL (ref 0.55–1.02)
DIFFERENTIAL METHOD BLD: NORMAL
EOSINOPHIL # BLD: 0.4 K/UL (ref 0–0.4)
EOSINOPHIL NFR BLD: 5 % (ref 0–7)
ERYTHROCYTE [DISTWIDTH] IN BLOOD BY AUTOMATED COUNT: 12.5 % (ref 11.5–14.5)
GLOBULIN SER CALC-MCNC: 3.3 G/DL (ref 2–4)
GLUCOSE SERPL-MCNC: 129 MG/DL (ref 65–100)
HCT VFR BLD AUTO: 40.5 % (ref 35–47)
HGB BLD-MCNC: 13.5 G/DL (ref 11.5–16)
IMM GRANULOCYTES # BLD AUTO: 0 K/UL (ref 0–0.04)
IMM GRANULOCYTES NFR BLD AUTO: 0 % (ref 0–0.5)
INR PPP: 1 (ref 0.9–1.1)
LYMPHOCYTES # BLD: 2.3 K/UL (ref 0.8–3.5)
LYMPHOCYTES NFR BLD: 30 % (ref 12–49)
MCH RBC QN AUTO: 30.1 PG (ref 26–34)
MCHC RBC AUTO-ENTMCNC: 33.3 G/DL (ref 30–36.5)
MCV RBC AUTO: 90.4 FL (ref 80–99)
MONOCYTES # BLD: 0.6 K/UL (ref 0–1)
MONOCYTES NFR BLD: 8 % (ref 5–13)
NEUTS SEG # BLD: 4.5 K/UL (ref 1.8–8)
NEUTS SEG NFR BLD: 56 % (ref 32–75)
NRBC # BLD: 0 K/UL (ref 0–0.01)
NRBC BLD-RTO: 0 PER 100 WBC
PLATELET # BLD AUTO: 274 K/UL (ref 150–400)
PMV BLD AUTO: 9.3 FL (ref 8.9–12.9)
POTASSIUM SERPL-SCNC: 4.4 MMOL/L (ref 3.5–5.1)
PROT SERPL-MCNC: 6.9 G/DL (ref 6.4–8.2)
PROTHROMBIN TIME: 10.1 SEC (ref 9–11.1)
RBC # BLD AUTO: 4.48 M/UL (ref 3.8–5.2)
SODIUM SERPL-SCNC: 135 MMOL/L (ref 136–145)
THERAPEUTIC RANGE,PTTT: NORMAL SECS (ref 58–77)
WBC # BLD AUTO: 7.8 K/UL (ref 3.6–11)

## 2020-07-28 PROCEDURE — 80053 COMPREHEN METABOLIC PANEL: CPT

## 2020-07-28 PROCEDURE — 85025 COMPLETE CBC W/AUTO DIFF WBC: CPT

## 2020-07-28 PROCEDURE — 36415 COLL VENOUS BLD VENIPUNCTURE: CPT

## 2020-07-28 PROCEDURE — 85730 THROMBOPLASTIN TIME PARTIAL: CPT

## 2020-07-28 PROCEDURE — 85610 PROTHROMBIN TIME: CPT

## 2020-07-28 RX ORDER — LIOTHYRONINE SODIUM 5 UG/1
5 TABLET ORAL DAILY
COMMUNITY
End: 2022-05-31 | Stop reason: ALTCHOICE

## 2020-07-28 RX ORDER — LEVOTHYROXINE SODIUM 200 UG/1
200 TABLET ORAL
COMMUNITY
End: 2020-08-11 | Stop reason: CLARIF

## 2020-07-28 NOTE — PERIOP NOTES
NO ORDERS FAXED OR IN CC. PONCHO AMEZCUA SENT ORDERS THROUGH CC TO BE DONE PER ANESTHESIA PROTOCOL. DO NOT EAT ANYTHING AFTER MIDNIGHT, except as instructed THE NIGHT BEFORE SURGERY. PT GIVEN OPPORTUNITY TO ASK ADDITIONAL QUESTIONS. Patient given two bottles CHG soap and instruction sheet, instructions for use reviewed with patient. Patient given surgical site infection information FAQs handout and hand hygiene tips sheet. Pre-operative instructions reviewed and patient verbalizes understanding of instructions. Patient has been given the opportunity to ask additional questions. [address] APPOINTMENTS REVIEWED AND GIVEN TO PATIENT, INCLUDING PHONE NUMBER TO REGISTRATION. PT HAD A URINE CX DONE AT [facility] ED AND IS NOW BEING TREATED FOR A UTI PER DR PARRISH.

## 2020-07-28 NOTE — PROGRESS NOTES
[facility] paperwork for [employer] completed, faxed, and confirmation received. Release on file.

## 2020-07-29 ENCOUNTER — DOCUMENTATION ONLY (OUTPATIENT)
Dept: SURGERY | Age: 55
End: 2020-07-29

## 2020-07-29 ENCOUNTER — PATIENT MESSAGE (OUTPATIENT)
Dept: SURGERY | Age: 55
End: 2020-07-29

## 2020-07-29 ENCOUNTER — TELEPHONE (OUTPATIENT)
Dept: SURGERY | Age: 55
End: 2020-07-29

## 2020-07-29 ENCOUNTER — ANESTHESIA EVENT (OUTPATIENT)
Dept: SURGERY | Age: 55
End: 2020-07-29
Payer: COMMERCIAL

## 2020-07-29 NOTE — TELEPHONE ENCOUNTER
----- Message from Suzy Sosa RN sent at 7/29/2020  9:04 AM EDT -----  Melvi Vargas,  Please be sure Dr Ankur Mendez is aware patient is currently being treated for UTI. Thanks.   Tanya Alva RN  [phone]

## 2020-07-29 NOTE — PROGRESS NOTES
[employer] [facility] paperwork updated and faxed confirmation received. Release on file.

## 2020-07-29 NOTE — PERIOP NOTES
MESSAGE SENT THROUGH [facility] TO [facility] DR DUENAS [facility] NURSE THAT PATIENT IS CURRENTLY BEING TREATED FOR UTI.

## 2020-07-29 NOTE — TELEPHONE ENCOUNTER
Patient identified with two patient identifiers. Patient states she received messages. Informed her updated form was received, she will take the recommended time off and revisit after surgery instead of intermittent leave. I will completed and resend. She will return call if any other questions or concerns.

## 2020-07-29 NOTE — TELEPHONE ENCOUNTER
Left message for patient to return call regarding email sent in outlook from her regarding time off for surgery. Patient advised not to send any questions via email through outlook. She needs to use [employer] and or call the office. She can send blank paperwork via email if needed for me to print and complete, but no questions.

## 2020-07-29 NOTE — TELEPHONE ENCOUNTER
[place] emails received:  Ronni Oseguera know how Francisco Mckeon works but Im not going to work this week tomorrow I have to get ekg blood work preadmissiion and wed clean (my day off) Thursday operation theN Friday recovery if you could do max for as many times a week I would need  after operation that would be great I dont get paid and in 7 years never needed Matt i do not know how my body will react I work [employer] Tuesday Thursday Friday 10 am till 9 pm so for intermittent as many times you can give me would be great. I will not abuse it but 10 hours a day sitting in a chair is a long day I would like the max amount of time to get off phone if I have to lay down or take a half a day. If you could send me the form after it is filled out franchesca said I should have a copy to know my parameters. Thank you for all you do I appreciate you Volodymyr [place] Morning Shirin,  I know you will regret giving me your email. I talked to my boss about my laparoscopy gall bladder removal and she just had hers done a month ago and suggested I take short term disability for a few weeks. When I had my knee surgery I had complications with anemia and blood loss. She said sitting upright ten hours a day taking back to back phone calls would not be a great idea. She said her stomach hurt a lot after her surgery even laparoscopy and it is a benefit I have with my company. I talked to [employer] and they agreed. They said if after three weeks I feel well enough to do it I Or even sooner can turn it into intermittent [place] and have times I can get off phone if it is too much. I dont understand this whole process too much but the disability person should call me today and they will give me a couple extra forms to fill out. I am going for my pre surgical appt today which I assume is not with you. I will email you the forms when I get them.   It was suggested to take three weeks starting July 24th and if I feel like I can sit up then I can switch to intermittent.   Again thank you for you time and patience Jem Cedeno from my iPhone    > On Jul 27, 2020, at 8:59 PM, [email or web address] wrote:

## 2020-07-30 ENCOUNTER — HOSPITAL ENCOUNTER (OUTPATIENT)
Age: 55
Setting detail: OUTPATIENT SURGERY
Discharge: HOME | End: 2020-07-30
Attending: SURGERY | Admitting: SURGERY
Payer: COMMERCIAL

## 2020-07-30 ENCOUNTER — ANESTHESIA (OUTPATIENT)
Dept: SURGERY | Age: 55
End: 2020-07-30
Payer: COMMERCIAL

## 2020-07-30 VITALS
DIASTOLIC BLOOD PRESSURE: 95 MMHG | BODY MASS INDEX: 35.63 KG/M2 | RESPIRATION RATE: 14 BRPM | HEIGHT: 67 IN | OXYGEN SATURATION: 98 % | WEIGHT: 227 LBS | TEMPERATURE: 97.9 F | HEART RATE: 97 BPM | SYSTOLIC BLOOD PRESSURE: 155 MMHG

## 2020-07-30 PROCEDURE — 77030018836 HC SOL IRR NACL ICUM -A: Performed by: SURGERY

## 2020-07-30 PROCEDURE — 77030008608 HC TRCR ENDOSC SMTH AMR -B: Performed by: SURGERY

## 2020-07-30 PROCEDURE — 76010000149 HC OR TIME 1 TO 1.5 HR: Performed by: SURGERY

## 2020-07-30 PROCEDURE — 74011250636 HC RX REV CODE- 250/636: Performed by: ANESTHESIOLOGY

## 2020-07-30 PROCEDURE — 74011250636 HC RX REV CODE- 250/636: Performed by: NURSE ANESTHETIST, CERTIFIED REGISTERED

## 2020-07-30 PROCEDURE — 77030002933 HC SUT MCRYL J&J -A: Performed by: SURGERY

## 2020-07-30 PROCEDURE — 77030010515 HC APPL ENDOCLP LIG J&J -B: Performed by: SURGERY

## 2020-07-30 PROCEDURE — 74011000258 HC RX REV CODE- 258: Performed by: NURSE ANESTHETIST, CERTIFIED REGISTERED

## 2020-07-30 PROCEDURE — 77030010507 HC ADH SKN DERMBND J&J -B: Performed by: SURGERY

## 2020-07-30 PROCEDURE — 77030019908 HC STETH ESOPH SIMS -A: Performed by: ANESTHESIOLOGY

## 2020-07-30 PROCEDURE — 76210000021 HC REC RM PH II 0.5 TO 1 HR: Performed by: SURGERY

## 2020-07-30 PROCEDURE — 77030040922 HC BLNKT HYPOTHRM STRY -A

## 2020-07-30 PROCEDURE — 76210000017 HC OR PH I REC 1.5 TO 2 HR: Performed by: SURGERY

## 2020-07-30 PROCEDURE — 77030026438 HC STYL ET INTUB CARD -A: Performed by: ANESTHESIOLOGY

## 2020-07-30 PROCEDURE — 77030002895 HC DEV VASC CLOSR COVD -B: Performed by: SURGERY

## 2020-07-30 PROCEDURE — 74011000250 HC RX REV CODE- 250: Performed by: NURSE ANESTHETIST, CERTIFIED REGISTERED

## 2020-07-30 PROCEDURE — 77030031139 HC SUT VCRL2 J&J -A: Performed by: SURGERY

## 2020-07-30 PROCEDURE — 74011250637 HC RX REV CODE- 250/637: Performed by: ANESTHESIOLOGY

## 2020-07-30 PROCEDURE — 77030037032 HC INSRT SCIS CLICKLLINE DISP STOR -B: Performed by: SURGERY

## 2020-07-30 PROCEDURE — 88304 TISSUE EXAM BY PATHOLOGIST: CPT

## 2020-07-30 PROCEDURE — 77030040361 HC SLV COMPR DVT MDII -B: Performed by: SURGERY

## 2020-07-30 PROCEDURE — 77030020053 HC ELECTRD LAPSCP COVD -B: Performed by: SURGERY

## 2020-07-30 PROCEDURE — 77030008756 HC TU IRR SUC STRY -B: Performed by: SURGERY

## 2020-07-30 PROCEDURE — 77030012770 HC TRCR OPT FX AMR -B: Performed by: SURGERY

## 2020-07-30 PROCEDURE — 77030008684 HC TU ET CUF COVD -B: Performed by: ANESTHESIOLOGY

## 2020-07-30 PROCEDURE — 77030020829: Performed by: SURGERY

## 2020-07-30 PROCEDURE — 77030017006 HC DISECTR CRV1 J&J -B: Performed by: SURGERY

## 2020-07-30 PROCEDURE — 74011250636 HC RX REV CODE- 250/636: Performed by: SURGERY

## 2020-07-30 PROCEDURE — 77030011640 HC PAD GRND REM COVD -A: Performed by: SURGERY

## 2020-07-30 PROCEDURE — 76060000034 HC ANESTHESIA 1.5 TO 2 HR: Performed by: SURGERY

## 2020-07-30 PROCEDURE — 77030007955 HC PCH ENDOSC SPEC J&J -B: Performed by: SURGERY

## 2020-07-30 PROCEDURE — 74011000250 HC RX REV CODE- 250: Performed by: SURGERY

## 2020-07-30 RX ORDER — MIDAZOLAM HYDROCHLORIDE 1 MG/ML
1 INJECTION, SOLUTION INTRAMUSCULAR; INTRAVENOUS AS NEEDED
Status: DISCONTINUED | OUTPATIENT
Start: 2020-07-30 | End: 2020-07-30 | Stop reason: HOSPADM

## 2020-07-30 RX ORDER — FENTANYL CITRATE 50 UG/ML
INJECTION, SOLUTION INTRAMUSCULAR; INTRAVENOUS AS NEEDED
Status: DISCONTINUED | OUTPATIENT
Start: 2020-07-30 | End: 2020-07-30 | Stop reason: HOSPADM

## 2020-07-30 RX ORDER — ONDANSETRON 2 MG/ML
INJECTION INTRAMUSCULAR; INTRAVENOUS AS NEEDED
Status: DISCONTINUED | OUTPATIENT
Start: 2020-07-30 | End: 2020-07-30 | Stop reason: HOSPADM

## 2020-07-30 RX ORDER — KETOROLAC TROMETHAMINE 30 MG/ML
INJECTION, SOLUTION INTRAMUSCULAR; INTRAVENOUS AS NEEDED
Status: DISCONTINUED | OUTPATIENT
Start: 2020-07-30 | End: 2020-07-30 | Stop reason: HOSPADM

## 2020-07-30 RX ORDER — SODIUM CHLORIDE, SODIUM LACTATE, POTASSIUM CHLORIDE, CALCIUM CHLORIDE 600; 310; 30; 20 MG/100ML; MG/100ML; MG/100ML; MG/100ML
75 INJECTION, SOLUTION INTRAVENOUS CONTINUOUS
Status: DISCONTINUED | OUTPATIENT
Start: 2020-07-30 | End: 2020-07-30 | Stop reason: HOSPADM

## 2020-07-30 RX ORDER — SODIUM CHLORIDE 0.9 % (FLUSH) 0.9 %
5-40 SYRINGE (ML) INJECTION EVERY 8 HOURS
Status: DISCONTINUED | OUTPATIENT
Start: 2020-07-30 | End: 2020-07-30 | Stop reason: HOSPADM

## 2020-07-30 RX ORDER — LIDOCAINE HYDROCHLORIDE 10 MG/ML
0.1 INJECTION, SOLUTION EPIDURAL; INFILTRATION; INTRACAUDAL; PERINEURAL AS NEEDED
Status: DISCONTINUED | OUTPATIENT
Start: 2020-07-30 | End: 2020-07-30 | Stop reason: HOSPADM

## 2020-07-30 RX ORDER — SUCCINYLCHOLINE CHLORIDE 20 MG/ML
INJECTION INTRAMUSCULAR; INTRAVENOUS AS NEEDED
Status: DISCONTINUED | OUTPATIENT
Start: 2020-07-30 | End: 2020-07-30 | Stop reason: HOSPADM

## 2020-07-30 RX ORDER — SODIUM CHLORIDE 9 MG/ML
25 INJECTION, SOLUTION INTRAVENOUS CONTINUOUS
Status: DISCONTINUED | OUTPATIENT
Start: 2020-07-30 | End: 2020-07-30 | Stop reason: HOSPADM

## 2020-07-30 RX ORDER — FENTANYL CITRATE 50 UG/ML
25 INJECTION, SOLUTION INTRAMUSCULAR; INTRAVENOUS
Status: COMPLETED | OUTPATIENT
Start: 2020-07-30 | End: 2020-07-30

## 2020-07-30 RX ORDER — ROCURONIUM BROMIDE 10 MG/ML
INJECTION, SOLUTION INTRAVENOUS AS NEEDED
Status: DISCONTINUED | OUTPATIENT
Start: 2020-07-30 | End: 2020-07-30 | Stop reason: HOSPADM

## 2020-07-30 RX ORDER — DEXAMETHASONE SODIUM PHOSPHATE 4 MG/ML
INJECTION, SOLUTION INTRA-ARTICULAR; INTRALESIONAL; INTRAMUSCULAR; INTRAVENOUS; SOFT TISSUE AS NEEDED
Status: DISCONTINUED | OUTPATIENT
Start: 2020-07-30 | End: 2020-07-30 | Stop reason: HOSPADM

## 2020-07-30 RX ORDER — SODIUM CHLORIDE 0.9 % (FLUSH) 0.9 %
5-40 SYRINGE (ML) INJECTION AS NEEDED
Status: DISCONTINUED | OUTPATIENT
Start: 2020-07-30 | End: 2020-07-30 | Stop reason: HOSPADM

## 2020-07-30 RX ORDER — FENTANYL CITRATE 50 UG/ML
50 INJECTION, SOLUTION INTRAMUSCULAR; INTRAVENOUS AS NEEDED
Status: DISCONTINUED | OUTPATIENT
Start: 2020-07-30 | End: 2020-07-30 | Stop reason: HOSPADM

## 2020-07-30 RX ORDER — PROPOFOL 10 MG/ML
INJECTION, EMULSION INTRAVENOUS AS NEEDED
Status: DISCONTINUED | OUTPATIENT
Start: 2020-07-30 | End: 2020-07-30 | Stop reason: HOSPADM

## 2020-07-30 RX ORDER — HYDROMORPHONE HYDROCHLORIDE 1 MG/ML
0.2 INJECTION, SOLUTION INTRAMUSCULAR; INTRAVENOUS; SUBCUTANEOUS
Status: DISCONTINUED | OUTPATIENT
Start: 2020-07-30 | End: 2020-07-30 | Stop reason: HOSPADM

## 2020-07-30 RX ORDER — MIDAZOLAM HYDROCHLORIDE 1 MG/ML
INJECTION, SOLUTION INTRAMUSCULAR; INTRAVENOUS AS NEEDED
Status: DISCONTINUED | OUTPATIENT
Start: 2020-07-30 | End: 2020-07-30 | Stop reason: HOSPADM

## 2020-07-30 RX ORDER — CEFAZOLIN SODIUM/WATER 2 G/20 ML
2 SYRINGE (ML) INTRAVENOUS ONCE
Status: COMPLETED | OUTPATIENT
Start: 2020-07-30 | End: 2020-07-30

## 2020-07-30 RX ORDER — SODIUM CHLORIDE, SODIUM LACTATE, POTASSIUM CHLORIDE, CALCIUM CHLORIDE 600; 310; 30; 20 MG/100ML; MG/100ML; MG/100ML; MG/100ML
125 INJECTION, SOLUTION INTRAVENOUS CONTINUOUS
Status: DISCONTINUED | OUTPATIENT
Start: 2020-07-30 | End: 2020-07-30 | Stop reason: HOSPADM

## 2020-07-30 RX ORDER — LIDOCAINE HYDROCHLORIDE 20 MG/ML
INJECTION, SOLUTION EPIDURAL; INFILTRATION; INTRACAUDAL; PERINEURAL AS NEEDED
Status: DISCONTINUED | OUTPATIENT
Start: 2020-07-30 | End: 2020-07-30 | Stop reason: HOSPADM

## 2020-07-30 RX ORDER — GLYCOPYRROLATE 0.2 MG/ML
INJECTION INTRAMUSCULAR; INTRAVENOUS AS NEEDED
Status: DISCONTINUED | OUTPATIENT
Start: 2020-07-30 | End: 2020-07-30 | Stop reason: HOSPADM

## 2020-07-30 RX ORDER — NEOSTIGMINE METHYLSULFATE 1 MG/ML
INJECTION INTRAVENOUS AS NEEDED
Status: DISCONTINUED | OUTPATIENT
Start: 2020-07-30 | End: 2020-07-30 | Stop reason: HOSPADM

## 2020-07-30 RX ORDER — MIDAZOLAM HYDROCHLORIDE 1 MG/ML
0.5 INJECTION, SOLUTION INTRAMUSCULAR; INTRAVENOUS
Status: DISCONTINUED | OUTPATIENT
Start: 2020-07-30 | End: 2020-07-30 | Stop reason: HOSPADM

## 2020-07-30 RX ORDER — DIPHENHYDRAMINE HYDROCHLORIDE 50 MG/ML
12.5 INJECTION, SOLUTION INTRAMUSCULAR; INTRAVENOUS AS NEEDED
Status: DISCONTINUED | OUTPATIENT
Start: 2020-07-30 | End: 2020-07-30 | Stop reason: HOSPADM

## 2020-07-30 RX ORDER — BUPIVACAINE HYDROCHLORIDE AND EPINEPHRINE 5; 5 MG/ML; UG/ML
30 INJECTION, SOLUTION EPIDURAL; INTRACAUDAL; PERINEURAL ONCE
Status: COMPLETED | OUTPATIENT
Start: 2020-07-30 | End: 2020-07-30

## 2020-07-30 RX ORDER — ROPIVACAINE HYDROCHLORIDE 5 MG/ML
30 INJECTION, SOLUTION EPIDURAL; INFILTRATION; PERINEURAL ONCE
Status: DISCONTINUED | OUTPATIENT
Start: 2020-07-30 | End: 2020-07-30 | Stop reason: HOSPADM

## 2020-07-30 RX ORDER — MORPHINE SULFATE 10 MG/ML
2 INJECTION, SOLUTION INTRAMUSCULAR; INTRAVENOUS
Status: DISCONTINUED | OUTPATIENT
Start: 2020-07-30 | End: 2020-07-30 | Stop reason: HOSPADM

## 2020-07-30 RX ORDER — ACETAMINOPHEN 325 MG/1
650 TABLET ORAL ONCE
Status: COMPLETED | OUTPATIENT
Start: 2020-07-30 | End: 2020-07-30

## 2020-07-30 RX ORDER — ONDANSETRON 2 MG/ML
4 INJECTION INTRAMUSCULAR; INTRAVENOUS AS NEEDED
Status: DISCONTINUED | OUTPATIENT
Start: 2020-07-30 | End: 2020-07-30 | Stop reason: HOSPADM

## 2020-07-30 RX ORDER — PHENYLEPHRINE HCL IN 0.9% NACL 0.4MG/10ML
SYRINGE (ML) INTRAVENOUS AS NEEDED
Status: DISCONTINUED | OUTPATIENT
Start: 2020-07-30 | End: 2020-07-30 | Stop reason: HOSPADM

## 2020-07-30 RX ADMIN — PROPOFOL 200 MG: 10 INJECTION, EMULSION INTRAVENOUS at 12:21

## 2020-07-30 RX ADMIN — SODIUM CHLORIDE, SODIUM LACTATE, POTASSIUM CHLORIDE, AND CALCIUM CHLORIDE 125 ML/HR: 600; 310; 30; 20 INJECTION, SOLUTION INTRAVENOUS at 10:55

## 2020-07-30 RX ADMIN — HYDROMORPHONE HYDROCHLORIDE 0.2 MG: 1 INJECTION, SOLUTION INTRAMUSCULAR; INTRAVENOUS; SUBCUTANEOUS at 14:55

## 2020-07-30 RX ADMIN — NEOSTIGMINE METHYLSULFATE 2.5 MG: 1 INJECTION, SOLUTION INTRAVENOUS at 13:11

## 2020-07-30 RX ADMIN — ONDANSETRON HYDROCHLORIDE 4 MG: 2 INJECTION, SOLUTION INTRAMUSCULAR; INTRAVENOUS at 13:11

## 2020-07-30 RX ADMIN — SUCCINYLCHOLINE CHLORIDE 140 MG: 20 INJECTION, SOLUTION INTRAMUSCULAR; INTRAVENOUS at 12:21

## 2020-07-30 RX ADMIN — DEXMEDETOMIDINE HYDROCHLORIDE 8 MCG: 100 INJECTION, SOLUTION, CONCENTRATE INTRAVENOUS at 12:25

## 2020-07-30 RX ADMIN — FENTANYL CITRATE 25 MCG: 50 INJECTION, SOLUTION INTRAMUSCULAR; INTRAVENOUS at 13:50

## 2020-07-30 RX ADMIN — MIDAZOLAM HYDROCHLORIDE 0.5 MG: 2 INJECTION, SOLUTION INTRAMUSCULAR; INTRAVENOUS at 14:10

## 2020-07-30 RX ADMIN — HYDROMORPHONE HYDROCHLORIDE 0.2 MG: 1 INJECTION, SOLUTION INTRAMUSCULAR; INTRAVENOUS; SUBCUTANEOUS at 14:25

## 2020-07-30 RX ADMIN — ONDANSETRON 4 MG: 2 INJECTION INTRAMUSCULAR; INTRAVENOUS at 13:40

## 2020-07-30 RX ADMIN — HYDROMORPHONE HYDROCHLORIDE 0.2 MG: 1 INJECTION, SOLUTION INTRAMUSCULAR; INTRAVENOUS; SUBCUTANEOUS at 14:41

## 2020-07-30 RX ADMIN — KETOROLAC TROMETHAMINE 30 MG: 30 INJECTION, SOLUTION INTRAMUSCULAR; INTRAVENOUS at 13:11

## 2020-07-30 RX ADMIN — HYDROMORPHONE HYDROCHLORIDE 0.2 MG: 1 INJECTION, SOLUTION INTRAMUSCULAR; INTRAVENOUS; SUBCUTANEOUS at 13:55

## 2020-07-30 RX ADMIN — MIDAZOLAM 2 MG: 1 INJECTION INTRAMUSCULAR; INTRAVENOUS at 12:06

## 2020-07-30 RX ADMIN — MEPERIDINE HYDROCHLORIDE 25 MG: 50 INJECTION INTRAMUSCULAR; INTRAVENOUS; SUBCUTANEOUS at 12:27

## 2020-07-30 RX ADMIN — SODIUM CHLORIDE, SODIUM LACTATE, POTASSIUM CHLORIDE, AND CALCIUM CHLORIDE 75 ML/HR: 600; 310; 30; 20 INJECTION, SOLUTION INTRAVENOUS at 14:48

## 2020-07-30 RX ADMIN — GLYCOPYRROLATE 0.4 MG: 0.2 INJECTION, SOLUTION INTRAMUSCULAR; INTRAVENOUS at 13:11

## 2020-07-30 RX ADMIN — FENTANYL CITRATE 100 MCG: 50 INJECTION, SOLUTION INTRAMUSCULAR; INTRAVENOUS at 12:27

## 2020-07-30 RX ADMIN — HYDROMORPHONE HYDROCHLORIDE 0.2 MG: 1 INJECTION, SOLUTION INTRAMUSCULAR; INTRAVENOUS; SUBCUTANEOUS at 14:10

## 2020-07-30 RX ADMIN — LIDOCAINE HYDROCHLORIDE 100 MG: 20 INJECTION, SOLUTION EPIDURAL; INFILTRATION; INTRACAUDAL; PERINEURAL at 12:21

## 2020-07-30 RX ADMIN — FENTANYL CITRATE 100 MCG: 50 INJECTION, SOLUTION INTRAMUSCULAR; INTRAVENOUS at 12:06

## 2020-07-30 RX ADMIN — FENTANYL CITRATE 25 MCG: 50 INJECTION, SOLUTION INTRAMUSCULAR; INTRAVENOUS at 13:40

## 2020-07-30 RX ADMIN — MIDAZOLAM HYDROCHLORIDE 0.5 MG: 2 INJECTION, SOLUTION INTRAMUSCULAR; INTRAVENOUS at 14:19

## 2020-07-30 RX ADMIN — Medication 2 G: at 12:30

## 2020-07-30 RX ADMIN — DEXAMETHASONE SODIUM PHOSPHATE 4 MG: 4 INJECTION, SOLUTION INTRAMUSCULAR; INTRAVENOUS at 12:30

## 2020-07-30 RX ADMIN — Medication 120 MCG: at 12:36

## 2020-07-30 RX ADMIN — FENTANYL CITRATE 25 MCG: 50 INJECTION, SOLUTION INTRAMUSCULAR; INTRAVENOUS at 13:45

## 2020-07-30 RX ADMIN — ACETAMINOPHEN 650 MG: 325 TABLET ORAL at 11:04

## 2020-07-30 RX ADMIN — ROCURONIUM BROMIDE 40 MG: 10 SOLUTION INTRAVENOUS at 12:28

## 2020-07-30 RX ADMIN — ROCURONIUM BROMIDE 10 MG: 10 SOLUTION INTRAVENOUS at 12:21

## 2020-07-30 RX ADMIN — FENTANYL CITRATE 25 MCG: 50 INJECTION, SOLUTION INTRAMUSCULAR; INTRAVENOUS at 13:55

## 2020-07-30 NOTE — ANESTHESIA POSTPROCEDURE EVALUATION
Post-Anesthesia Evaluation and Assessment    Patient: Mame Vicente MRN: [id number]  SSN: [id number]    YOB: 1965  Age: 50 y.o. Sex: female      I have evaluated the patient and they are stable and ready for discharge from the PACU. Cardiovascular Function/Vital Signs  Visit Vitals  BP (!) 166/106   Pulse 91   Temp 36.4 °C (97.6 °F)   Resp 18   Ht 5' 7\" (1.702 m)   Wt 103 kg (227 lb)   SpO2 97%   BMI 35.55 kg/m²       Patient is status post General anesthesia for Procedure(s):  LAPAROSCOPIC CHOLECYSTECTOMY (URGENT). Nausea/Vomiting: None    Postoperative hydration reviewed and adequate. Pain:  Pain Scale 1: Numeric (0 - 10) (07/30/20 1040)  Pain Intensity 1: 8 (07/30/20 1040)   Managed    Neurological Status:   Neuro (WDL): Within Defined Limits (07/30/20 1050)   At baseline    Mental Status, Level of Consciousness: Alert and  oriented to person, place, and time    Pulmonary Status:   O2 Device: Nasal cannula (07/30/20 1332)   Adequate oxygenation and airway patent    Complications related to anesthesia: None    Post-anesthesia assessment completed. No concerns    Signed By: Josse Niño MD     July 30, 2020              Procedure(s):  LAPAROSCOPIC CHOLECYSTECTOMY (URGENT). general    <BSHSIANPOST>    INITIAL Post-op Vital signs:   Vitals Value Taken Time   /96 7/30/2020  1:35 PM   Temp 36.4 °C (97.6 °F) 7/30/2020  1:32 PM   Pulse 94 7/30/2020  1:36 PM   Resp 18 7/30/2020  1:36 PM   SpO2 98 % 7/30/2020  1:36 PM   Vitals shown include unvalidated device data.

## 2020-07-30 NOTE — INTERVAL H&P NOTE
Update History & Physical 
 
The Patient's History and Physical of July 27, 2020 was reviewed with the patient and I examined the patient. There was no change. The surgical site was confirmed by the patient and me. Plan:  The risk, benefits, expected outcome, and alternative to the recommended procedure have been discussed with the patient. Patient understands and wants to proceed with the procedure.  
 
Electronically signed by Mychal Macdonald MD on 7/30/2020 at 11:37 AM

## 2020-07-30 NOTE — PERIOP NOTES
1520 patient doesnt want a xanex here, says that she has all her meds at home.  Son called phone at 300, and will be here in 25 min, and will call when he gets here

## 2020-07-30 NOTE — DISCHARGE INSTRUCTIONS
Patient Education        Cholecystectomy: What to Expect at Home  Your Recovery  After your surgery, it is normal to feel weak and tired for several days after you return home. Your belly may be swollen. If you had laparoscopic surgery, you may also have pain in your shoulder for about 24 hours. You may have gas or need to burp a lot at first, and a few people get diarrhea. The diarrhea usually goes away in 2 to 4 weeks, but it may last longer. How quickly you recover depends on whether you had a laparoscopic or open surgery. · For a laparoscopic surgery, most people can go back to work or their normal routine in 1 to 2 weeks, but it may take longer, depending on the type of work you do. · For an open surgery, it will probably take 4 to 6 weeks before you get back to your normal routine. This care sheet gives you a general idea about how long it will take for you to recover. However, each person recovers at a different pace. Follow the steps below to get better as quickly as possible. How can you care for yourself at home? Activity  · Rest when you feel tired. Getting enough sleep will help you recover. · Try to walk each day. Start out by walking a little more than you did the day before. Gradually increase the amount you walk. Walking boosts blood flow and helps prevent pneumonia and constipation. · For about 2 to 4 weeks, avoid lifting anything that would make you strain. This may include a child, heavy grocery bags and milk containers, a heavy briefcase or backpack, cat litter or dog food bags, or a vacuum . · Avoid strenuous activities, such as biking, jogging, weightlifting, and aerobic exercise, until your doctor says it is okay. · You may shower 24 to 48 hours after surgery, if your doctor okays it. Pat the cut (incision) dry. Do not take a bath for the first 2 weeks, or until your doctor tells you it is okay.   · You may drive when you are no longer taking pain medicine and can quickly move your foot from the gas pedal to the brake. You must also be able to sit comfortably for a long period of time, even if you do not plan to go far. You might get caught in traffic. · For a laparoscopic surgery, most people can go back to work or their normal routine in 1 to 2 weeks, but it may take longer. For an open surgery, it will probably take 4 to 6 weeks before you get back to your normal routine. · Your doctor will tell you when you can have sex again. Diet  · Eat smaller meals more often instead of fewer larger meals. You can eat a normal diet, but avoid eating fatty foods for about 1 month. Fatty foods include hamburger, whole milk, cheese, and many snack foods. If your stomach is upset, try bland, low-fat foods like plain rice, broiled chicken, toast, and yogurt. · Drink plenty of fluids (unless your doctor tells you not to). · If you have diarrhea, try avoiding spicy foods, dairy products, fatty foods, and alcohol. You can also watch to see if specific foods cause it, and stop eating them. If the diarrhea continues for more than 2 weeks, talk to your doctor. · You may notice that your bowel movements are not regular right after your surgery. This is common. Try to avoid constipation and straining with bowel movements. You may want to take a fiber supplement every day. If you have not had a bowel movement after a couple of days, ask your doctor about taking a mild laxative. Medicines  · Your doctor will tell you if and when you can restart your medicines. He or she will also give you instructions about taking any new medicines. · If you take aspirin or some other blood thinner, ask your doctor if and when to start taking it again. Make sure that you understand exactly what your doctor wants you to do. · Take pain medicines exactly as directed. ? If the doctor gave you a prescription medicine for pain, take it as prescribed.   ? If you are not taking a prescription pain medicine, take an over-the-counter medicine such as acetaminophen (Tylenol), ibuprofen (Advil, Motrin), or naproxen (Aleve). Read and follow all instructions on the label. ? Do not take two or more pain medicines at the same time unless the doctor told you to. Many pain medicines contain acetaminophen, which is Tylenol. Too much Tylenol can be harmful. · If you think your pain medicine is making you sick to your stomach:  ? Take your medicine after meals (unless your doctor tells you not to). ? Ask your doctor for a different pain medicine. · If your doctor prescribed antibiotics, take them as directed. Do not stop taking them just because you feel better. You need to take the full course of antibiotics. Incision care  · If you have strips of tape on the incision, or cut, leave the tape on for a week or until it falls off. · After 24 to 48 hours, wash the area daily with warm, soapy water, and pat it dry. · You may have staples to hold the cut together. Keep them dry until your doctor takes them out. This is usually in 7 to 10 days. · Keep the area clean and dry. You may cover it with a gauze bandage if it weeps or rubs against clothing. Change the bandage every day. Ice  · To reduce swelling and pain, put ice or a cold pack on your belly for 10 to 20 minutes at a time. Do this every 1 to 2 hours. Put a thin cloth between the ice and your skin. Follow-up care is a key part of your treatment and safety. Be sure to make and go to all appointments, and call your doctor if you are having problems. It's also a good idea to know your test results and keep a list of the medicines you take. When should you call for help? [id number] anytime you think you may need emergency care. For example, call if:  · You passed out (lost consciousness). · You are short of breath. .  Call your doctor now or seek immediate medical care if:  · You are sick to your stomach and cannot drink fluids.   · You have pain that does not get better when you take your pain medicine. · You cannot pass stools or gas. · You have signs of infection, such as:  ? Increased pain, swelling, warmth, or redness. ? Red streaks leading from the incision. ? Pus draining from the incision. ? A fever. · Bright red blood has soaked through the bandage over your incision. · You have loose stitches, or your incision comes open. · You have signs of a blood clot in your leg (called a deep vein thrombosis), such as:  ? Pain in your calf, back of knee, thigh, or groin. ? Redness and swelling in your leg or groin. Watch closely for any changes in your health, and be sure to contact your doctor if you have any problems. Where can you learn more? Go to http://rosales-gorge.info/  Enter F357 in the search box to learn more about \"Cholecystectomy: What to Expect at Home. \"  Current as of: 2019               Content Version: 12.5  © 6426-3025 "[employer]. Care instructions adapted under license by [employer] (which disclaims liability or warranty for this information). If you have questions about a medical condition or this instruction, always ask your healthcare professional. [place] [address] any warranty or liability for your use of this information. Patient Discharge Instructions    Vito Cage / [id number] : 1965    Admitted 2020 Discharged: 2020     Take Home Medications            · It is important that you take the medication exactly as they are prescribed. · Keep your medication in the bottles provided by the pharmacist and keep a list of the medication names, dosages, and times to be taken in your wallet. · Do not take other medications without consulting your doctor.        What to do at Home    Recommended diet: Regular Diet,     Recommended activity: Activity as tolerated, may shower whenever you wish          Follow-up Appointments   Procedures    FOLLOW UP VISIT Appointment in: Two Weeks     Standing Status:   Standing     Number of Occurrences:   1     Order Specific Question:   Appointment in     Answer: Two Weeks           Information obtained by :  I understand that if any problems occur once I am at home I am to contact my physician. I understand and acknowledge receipt of the instructions indicated above. Physician's or R.N.'s Signature                                                                  Date/Time                                                                                                                                              Patient or Representative Signature                                                          Date/Time            DISCHARGE SUMMARY from Nurse    PATIENT INSTRUCTIONS:    After general anesthesia or intravenous sedation, for 24 hours or while taking prescription Narcotics:  · Limit your activities  · Do not drive and operate hazardous machinery  · Do not make important personal or business decisions  · Do  not drink alcoholic beverages  · If you have not urinated within 8 hours after discharge, please contact your surgeon on call. Report the following to your surgeon:  · Excessive pain, swelling, redness or odor of or around the surgical area  · Temperature over 100.5  · Nausea and vomiting lasting longer than 4 hours or if unable to take medications  · Any signs of decreased circulation or nerve impairment to extremity: change in color, persistent  numbness, tingling, coldness or increase pain  · Any questions    The discharge information has been reviewed with the {PATIENT PARENT GUARDIAN:71948}. The {PATIENT PARENT GUARDIAN:79934} verbalized understanding.   Discharge medications reviewed with the {Dishcarge meds reviewed GPSQ:79751} and appropriate educational materials and side effects teaching were provided. _______________________________        Patient Education        Learning About Coronavirus (MTSJH-41)  Coronavirus [phone]): Overview  What is coronavirus (DSEQE-53)? The coronavirus disease (COVID-19) is caused by a virus. It is an illness that was first found in [place], [place], in December 2019. It has since spread worldwide. The virus can cause fever, cough, and trouble breathing. In severe cases, it can cause pneumonia and make it hard to breathe without help. It can cause death. Coronaviruses are a large group of viruses. They cause the common cold. They also cause more serious illnesses like Middle East respiratory syndrome (MERS) and severe acute respiratory syndrome (SARS). COVID-19 is caused by a novel coronavirus. That means it's a new type that has not been seen in people before. This virus spreads person-to-person through droplets from coughing and sneezing. It can also spread when you are close to someone who is infected. And it can spread when you touch something that has the virus on it, such as a doorknob or a tabletop. What can you do to protect yourself from coronavirus (COVID-19)? The best way to protect yourself from getting sick is to:  · Avoid areas where there is an outbreak. · Avoid contact with people who may be infected. · Wash your hands often with soap or alcohol-based hand sanitizers. · Avoid crowds and try to stay at least 6 feet away from other people. · Wash your hands often, especially after you cough or sneeze. Use soap and water, and scrub for at least 20 seconds. If soap and water aren't available, use an alcohol-based hand . · Avoid touching your mouth, nose, and eyes. What can you do to avoid spreading the virus to others? To help avoid spreading the virus to others:  · Cover your mouth with a tissue when you cough or sneeze. Then throw the tissue in the trash.   · Use a disinfectant to clean things that you touch often.  · Wear a cloth face cover if you have to go to public areas. · Stay home if you are sick or have been exposed to the virus. Don't go to school, work, or public areas. And don't use public transportation, ride-shares, or taxis unless you have no choice. · If you are sick:  ? Leave your home only if you need to get medical care. But call the doctor's office first so they know you're coming. And wear a face cover. ? Wear the face cover whenever you're around other people. It can help stop the spread of the virus when you cough or sneeze. ? Clean and disinfect your home every day. Use household  and disinfectant wipes or sprays. Take special care to clean things that you grab with your hands. These include doorknobs, remote controls, phones, and handles on your refrigerator and microwave. And don't forget countertops, tabletops, bathrooms, and computer keyboards. When to call for help  [id number] anytime you think you may need emergency care. For example, call if:  · You have severe trouble breathing. (You can't talk at all.)  · You have constant chest pain or pressure. · You are severely dizzy or lightheaded. · You are confused or can't think clearly. · Your face and lips have a blue color. · You pass out (lose consciousness) or are very hard to wake up. Call your doctor now if you develop symptoms such as:  · Shortness of breath. · Fever. · Cough. If you need to get care, call ahead to the doctor's office for instructions before you go. Make sure you wear a face cover to prevent exposing other people to the virus. Where can you get the latest information? The following health organizations are tracking and studying this virus. Their websites contain the most up-to-date information. Radha Urrutia also learn what to do if you think you may have been exposed to the virus. · U.S. Centers for Disease Control and Prevention (CDC): The CDC provides updated news about the disease and travel advice.  The website also tells you how to prevent the spread of infection. www.cdc.gov  · World Health Organization [facility]): WHO offers information about the virus outbreaks. WHO also has travel advice. www.who.int  Current as of: May 8, 2020               Content Version: 12.5  © 2006-2020 [employer]. Care instructions adapted under license by [employer] (which disclaims liability or warranty for this information). If you have questions about a medical condition or this instruction, always ask your healthcare professional. [address] any warranty or liability for your use of this information.        ____________________________________________________________________________________________________

## 2020-07-30 NOTE — ROUTINE PROCESS
Patient: Alfred Sandoval MRN: [id number]  SSN: [id number] YOB: 1965  Age: 50 y.o. Sex: female Patient is status post Procedure(s): LAPAROSCOPIC CHOLECYSTECTOMY (URGENT). Surgeon(s) and Role: Benigno Ward MD - Primary Local/Dose/Irrigation:  See [facility] Peripheral IV 07/30/20 Left Hand (Active) Airway - Endotracheal Tube 07/30/20 Oral (Active) Dressing/Packing:  Wound Abdomen 3 trocar sites-Dressing Type: Topical skin adhesive/glue(Dermabond) (07/30/20 1312) Splint/Cast:  ] Other:  N/A

## 2020-07-30 NOTE — PERIOP NOTES
Patient: Nini Hernandez MRN: [id number]  SSN: [id number]   YOB: 1965  Age: 50 y.o. Sex: female     Patient is status post Procedure(s):  LAPAROSCOPIC CHOLECYSTECTOMY (URGENT).     Surgeon(s) and Role:     * Conor Velasco MD - Primary    Local/Dose/Irrigation:  See [facility]                  Peripheral IV 07/30/20 Left Hand (Active)            Airway - Endotracheal Tube 07/30/20 Oral (Active)                   Dressing/Packing:  Wound Abdomen 4 trocar sites-Dressing Type: Topical skin adhesive/glue(Dermabond) (07/30/20 1312)    Splint/Cast:  ]    Other:  N/A

## 2020-07-30 NOTE — BRIEF OP NOTE
Brief Postoperative Note    Patient: Louise Mackey  YOB: 1965  MRN: [id number]    Date of Procedure: 7/30/2020     Pre-Op Diagnosis: CHOLELITHIASIS    Post-Op Diagnosis: Same as preoperative diagnosis.       Procedure(s):  LAPAROSCOPIC CHOLECYSTECTOMY (URGENT)    Surgeon(s):  Key Conteh MD    Surgical Assistant: Leandra Dickey    Anesthesia: General     Estimated Blood Loss (mL): Minimal    Complications: None    Specimens:   ID Type Source Tests Collected by Time Destination   1 : Gallbladder Fresh Gallbladder  Key Conteh MD 7/30/2020 1251 Pathology        Implants: * No implants in log *    Drains: * No LDAs found *    Findings: stones and inflamed gallbladder    Electronically Signed by Bethanie Sanchez MD on 7/30/2020 at 1:28 PM  [id number]

## 2020-07-31 DIAGNOSIS — K80.20 SYMPTOMATIC CHOLELITHIASIS: ICD-10-CM

## 2020-07-31 RX ORDER — HYDROMORPHONE HYDROCHLORIDE 2 MG/1
2 TABLET ORAL
Qty: 20 TAB | Refills: 0 | Status: SHIPPED | OUTPATIENT
Start: 2020-07-31 | End: 2020-08-04 | Stop reason: ALTCHOICE

## 2020-07-31 NOTE — OP NOTES
[address]   OPERATIVE REPORT    Name:  Jonatan Motta  MR#:  [id number]  :  1965  ACCOUNT #:  [de-identified]  DATE OF SERVICE:  2020      PREOPERATIVE DIAGNOSIS:  Symptomatic cholelithiasis. POSTOPERATIVE DIAGNOSIS:  Symptomatic cholelithiasis. PROCEDURE PERFORMED:  Laparoscopic cholecystectomy. SURGEON:  Mani Gardiner MD    ASSISTANT:  Ramírez Ovalle SA    ANESTHESIA:  General supplemented with 0.5% Marcaine with epinephrine. COMPLICATIONS:  None. SPECIMENS REMOVED:  Gallbladder. IMPLANTS:  None. ESTIMATED BLOOD LOSS:  Minimal.    DRAINS:  None. FINDINGS:  Findings were that of inflamed gallbladder wall. PROCEDURE:  With the patient supine and suitably anesthetized, the abdomen was prepared with ChloraPrep and draped as a field. 0.5% Marcaine with epinephrine was infiltrated in the appropriate places. A small stab wound was made in the right upper quadrant and the abdomen was entered using a VisiPort technique and a 5-mm trocar. Pneumoperitoneum was established. A 5-mm trocar was placed in the umbilical site, another 5-mm was placed laterally in the right upper quadrant, and a 12-mm was placed in the midline superiorly. The patient was placed in the reverse Trendelenburg position and turned to the left. The fundus of the gallbladder was grasped and retracted superiorly into the right. The infundibulum was grasped and the peritoneum overlying it was opened both anteriorly and posteriorly. There was a small anterior branch of the cystic artery which was clipped and divided. The cystic duct was triply clipped distally, singly clipped proximally and divided. There was a large cystic artery coming off a very large hepatic artery. This was skeletonized with and the cystic artery was clipped and divided. The gallbladder was then removed from the liver bed by a combination of blunt and cautery dissection.   It was placed into a retrieval bag and brought out through the superior trocar site. The trocar was replaced and the right upper quadrant was inspected carefully. Several little bleeders on the liver bed were coagulated. The right upper quadrant was then irrigated until clear. The patient was returned to the flat position and again the right upper quadrant was irrigated until clear. The 12-mm trocar was removed and the fascial defect there was closed with a 0 Vicryl passed with an Endo Close device. The two 5-mm trocars were removed under direct vision. The pneumoperitoneum was released and the periumbilical trocar was removed. The trocar sites were closed with subcuticular Monocryl followed by Dermabond. At the termination of the procedure, all counts were correct. The patient tolerated this well and was brought to the PACU in satisfactory condition.         Conrado Perkins MD      [id number]  D:  07/30/2020 13:32  T:  07/30/2020 20:31  JOB #:  [id number]  CC:  Dianne Frost MD

## 2020-08-04 DIAGNOSIS — G89.18 POSTOPERATIVE PAIN: Primary | ICD-10-CM

## 2020-08-04 RX ORDER — TRAMADOL HYDROCHLORIDE 50 MG/1
50 TABLET ORAL
Qty: 12 TAB | Refills: 0 | Status: SHIPPED | OUTPATIENT
Start: 2020-08-04 | End: 2020-08-07

## 2020-08-11 ENCOUNTER — OFFICE VISIT (OUTPATIENT)
Dept: SURGERY | Age: 55
End: 2020-08-11
Payer: COMMERCIAL

## 2020-08-11 VITALS
RESPIRATION RATE: 20 BRPM | HEART RATE: 105 BPM | WEIGHT: 224.2 LBS | DIASTOLIC BLOOD PRESSURE: 94 MMHG | OXYGEN SATURATION: 96 % | BODY MASS INDEX: 35.11 KG/M2 | SYSTOLIC BLOOD PRESSURE: 135 MMHG | TEMPERATURE: 98.6 F

## 2020-08-11 DIAGNOSIS — R10.11 RUQ PAIN: ICD-10-CM

## 2020-08-11 DIAGNOSIS — Z09 POSTOPERATIVE EXAMINATION: ICD-10-CM

## 2020-08-11 DIAGNOSIS — Z90.49 S/P LAPAROSCOPIC CHOLECYSTECTOMY: Primary | ICD-10-CM

## 2020-08-11 PROCEDURE — 99024 POSTOP FOLLOW-UP VISIT: CPT | Performed by: NURSE PRACTITIONER

## 2020-08-11 RX ORDER — DIGOXIN 250 MCG
TABLET ORAL DAILY
COMMUNITY
End: 2020-08-11 | Stop reason: CLARIF

## 2020-08-11 RX ORDER — ZINC GLUCONATE 10 MG
LOZENGE ORAL
COMMUNITY
End: 2020-08-11 | Stop reason: CLARIF

## 2020-08-11 RX ORDER — GUAIFENESIN 100 MG/5ML
81 LIQUID (ML) ORAL DAILY
COMMUNITY
End: 2020-08-11 | Stop reason: CLARIF

## 2020-08-11 RX ORDER — MELOXICAM 15 MG/1
15 TABLET ORAL DAILY
COMMUNITY
End: 2020-08-11 | Stop reason: CLARIF

## 2020-08-11 RX ORDER — GLUCOSAMINE SULFATE 1500 MG
POWDER IN PACKET (EA) ORAL DAILY
COMMUNITY
End: 2020-08-11 | Stop reason: CLARIF

## 2020-08-11 NOTE — PROGRESS NOTES
Subjective:      Bran Richards is a 50 y.o. female presents for postop care 2 weeks status post Laparoscopic Cholecystectomy. Appetite is fair. Eating a regular diet without difficulty. Bowel movements are regular. She complains of left lower quadrant pain that radiates to her back. Tramadol and Dilaudid did not help. She is concerned because she cannot sit up to work for 10 hours a day with the pain that she is having. She is currently taking ibuprofen for pain and using ice. Ms. Christina Carvajal has a reminder for a \"due or due soon\" health maintenance. I have asked that she contact her primary care provider for follow-up on this health maintenance. Objective:     Visit Vitals  BP (!) 135/94 (BP 1 Location: Left arm, BP Patient Position: Sitting)   Pulse (!) 105   Temp 98.6 °F (37 °C) (Oral)   Resp 20   Wt 224 lb 3.2 oz (101.7 kg)   LMP 04/13/2015   SpO2 96%   BMI 35.11 kg/m²       General:  alert, cooperative, no distress   Abdomen: soft, bowel sounds active, tender left upper quadrant. Incision:   healing well, no drainage, no erythema, no seroma, no swelling, no dehiscence, incision well approximated     Assessment:     Doing well postoperatively. Plan:     1. Will check amylase, Lipase and cmp  2. Will discuss result with Dr. Ankur Mendez. 3. Continue to use heat, ice and take ibuprofen. Pt verbalized understanding and questions were answered to the best of my knowledge and ability.

## 2020-08-11 NOTE — PROGRESS NOTES
1. Have you been to the ER, urgent care clinic since your last visit? Hospitalized since your last visit? No    2. Have you seen or consulted any other health care providers outside of the [address] [place] [address] since your last visit? Include any pap smears or colon screening.  No

## 2020-08-12 ENCOUNTER — TELEPHONE (OUTPATIENT)
Dept: SURGERY | Age: 55
End: 2020-08-12

## 2020-08-12 ENCOUNTER — OFFICE VISIT (OUTPATIENT)
Dept: SURGERY | Age: 55
End: 2020-08-12

## 2020-08-12 LAB
ALBUMIN SERPL-MCNC: 4.6 G/DL (ref 3.8–4.9)
ALBUMIN/GLOB SERPL: 1.9 {RATIO} (ref 1.2–2.2)
ALP SERPL-CCNC: 76 IU/L (ref 39–117)
ALT SERPL-CCNC: 16 IU/L (ref 0–32)
AMYLASE SERPL-CCNC: 135 U/L (ref 31–110)
AST SERPL-CCNC: 20 IU/L (ref 0–40)
BILIRUB SERPL-MCNC: 0.4 MG/DL (ref 0–1.2)
BUN SERPL-MCNC: 17 MG/DL (ref 6–24)
BUN/CREAT SERPL: 26 (ref 9–23)
CALCIUM SERPL-MCNC: 9.6 MG/DL (ref 8.7–10.2)
CHLORIDE SERPL-SCNC: 104 MMOL/L (ref 96–106)
CO2 SERPL-SCNC: 19 MMOL/L (ref 20–29)
CREAT SERPL-MCNC: 0.66 MG/DL (ref 0.57–1)
GLOBULIN SER CALC-MCNC: 2.4 G/DL (ref 1.5–4.5)
GLUCOSE SERPL-MCNC: 109 MG/DL (ref 65–99)
LIPASE SERPL-CCNC: 20 U/L (ref 14–72)
POTASSIUM SERPL-SCNC: 4.2 MMOL/L (ref 3.5–5.2)
PROT SERPL-MCNC: 7 G/DL (ref 6–8.5)
SODIUM SERPL-SCNC: 140 MMOL/L (ref 134–144)

## 2020-08-18 DIAGNOSIS — G89.18 POSTOPERATIVE PAIN: ICD-10-CM

## 2020-08-18 DIAGNOSIS — R10.11 RUQ PAIN: Primary | ICD-10-CM

## 2020-08-18 DIAGNOSIS — Z90.49 S/P CHOLECYSTECTOMY: ICD-10-CM

## 2020-08-18 RX ORDER — TRAMADOL HYDROCHLORIDE 50 MG/1
50 TABLET ORAL
Qty: 20 TAB | Refills: 0 | Status: SHIPPED | OUTPATIENT
Start: 2020-08-18 | End: 2020-08-23

## 2020-08-19 DIAGNOSIS — B00.9 HERPES: Primary | ICD-10-CM

## 2020-08-19 RX ORDER — VALACYCLOVIR HYDROCHLORIDE 500 MG/1
500 TABLET, FILM COATED ORAL 2 TIMES DAILY
Qty: 20 TAB | Refills: 0 | Status: SHIPPED | OUTPATIENT
Start: 2020-08-19 | End: 2020-08-29

## 2020-08-20 ENCOUNTER — VIRTUAL VISIT (OUTPATIENT)
Dept: PRIMARY CARE CLINIC | Age: 55
End: 2020-08-20
Payer: COMMERCIAL

## 2020-08-20 DIAGNOSIS — B00.9 HSV INFECTION: Primary | ICD-10-CM

## 2020-08-20 DIAGNOSIS — Z90.49 S/P LAPAROSCOPIC CHOLECYSTECTOMY: ICD-10-CM

## 2020-08-20 DIAGNOSIS — E78.2 MIXED HYPERLIPIDEMIA: ICD-10-CM

## 2020-08-20 DIAGNOSIS — E03.9 ACQUIRED HYPOTHYROIDISM: ICD-10-CM

## 2020-08-20 PROCEDURE — 99214 OFFICE O/P EST MOD 30 MIN: CPT | Performed by: NURSE PRACTITIONER

## 2020-08-20 NOTE — PROGRESS NOTES
[facility]   [address] [facility], [address] [place] [address]  P: [phone]  F: [phone]    SUBJECTIVE   Meredith Spatz is a 50 y.o. female who is seen over telehealth for Rash - reports 2 days of white/red blisters to her left thigh that are painful. She states she does have a history of HSV and has taken Valtrex in the past for cold sores, but she has never had a flareup of HSV to her legs. She does report increased stress related to recent gallbladder removal 7/30/2020 , reports she is overall recovering well. She was prescribed tramadol for postop pain and has taken several of those for pain related to the rash with benefit. She is requesting a full panel of lab work to recheck her thyroid, cholesterol, and blood counts.     Patient Active Problem List    Diagnosis    Symptomatic cholelithiasis    Osteochondritis dissecans of left knee    H/O knee surgery    Osteoarthritis    Depression with anxiety    Hypothyroidism    Hyperlipidemia    Hepatitis C    ADD (attention deficit disorder)      Past Medical History:   Diagnosis Date    Arthritis     Colitis     Colitis, ulcerative ([address]     Family history of skin cancer     GERD (gastroesophageal reflux disease)     Ill-defined condition     ADHD    Ill-defined condition     Hep C    Liver disease 2004    HEPATITIS C- NEVER TREATED    Nausea & vomiting     Psychiatric disorder     ADD    Skin cancer     BCC    Symptomatic cholelithiasis 7/27/2020    Thyroid disease     Ulcerative colitis ([facility] [address]      Past Surgical History:   Procedure Laterality Date    HX CHOLECYSTECTOMY  07/30/2020    HX KNEE ARTHROSCOPY      x3- last done 9/2015 to harvest cartilage    HX ORTHOPAEDIC Left 1/14/2016    articular cartilage transplant    HX TONSILLECTOMY  1988     Social History     Socioeconomic History    Marital status:      Spouse name: Not on file    Number of children: Not on file    Years of education: Not on file  Highest education level: Not on file   Occupational History    Not on file   Social Needs    Financial resource strain: Not on file    Food insecurity     Worry: Not on file     Inability: Not on file    Transportation needs     Medical: Not on file     Non-medical: Not on file   Tobacco Use    Smoking status: Former Smoker     Last attempt to quit:      Years since quittin.6    Smokeless tobacco: Former User    Tobacco comment: SMOKES 1 CIG PER MONTH   Substance and Sexual Activity    Alcohol use: No    Drug use: No    Sexual activity: Never   Lifestyle    Physical activity     Days per week: Not on file     Minutes per session: Not on file    Stress: Not on file   Relationships    Social connections     Talks on phone: Not on file     Gets together: Not on file     Attends Buddhist service: Not on file     Active member of club or organization: Not on file     Attends meetings of clubs or organizations: Not on file     Relationship status: Not on file    Intimate partner violence     Fear of current or ex partner: Not on file     Emotionally abused: Not on file     Physically abused: Not on file     Forced sexual activity: Not on file   Other Topics Concern    Not on file   Social History Narrative    Not on file     Family History   Problem Relation Age of Onset    Cancer Mother         melanoma    Hypertension Father     Cancer Father         melanoma    Arthritis-osteo Father     No Known Problems Sister     No Known Problems Son     No Known Problems Daughter     No Known Problems Daughter     Anesth Problems Neg Hx      Allergies   Allergen Reactions    Tylenol-Codeine #3 [Acetaminophen-Codeine] Unknown (comments)     Takes hydrocodone at home. Has used oxycodone at well       Current Outpatient Medications   Medication Sig Dispense Refill    valACYclovir (VALTREX) 500 mg tablet Take 1 Tab by mouth two (2) times a day for 10 days.  20 Tab 0    traMADoL (ULTRAM) 50 mg tablet Take 1 Tab by mouth every six (6) hours as needed for Pain for up to 5 days. Max Daily Amount: 200 mg. 20 Tab 0    liothyronine (CYTOMEL) 5 mcg tablet Take 5 mcg by mouth daily.  dextroamphetamine-amphetamine (ADDERALL) 30 mg tablet Take 30 mg by mouth two (2) times a day. ONLY TAKES WHEN WORKING      ALPRAZolam (XANAX) 1 mg tablet 0.5 mg three (3) times daily as needed.  omeprazole (PRILOSEC) 40 mg capsule TAKE 1 CAPSULE BY MOUTH EVERY DAY 90 Cap 0           The medications were reviewed and updated in the medical record. The past medical history, past surgical history, and family history were reviewed and updated in the medical record. REVIEW OF SYSTEMS   Review of Systems   Constitutional: Negative for malaise/fatigue. HENT: Negative for congestion. Eyes: Negative for blurred vision and pain. Respiratory: Negative for cough and shortness of breath. Cardiovascular: Negative for chest pain and palpitations. Gastrointestinal: Negative for abdominal pain and heartburn. Genitourinary: Negative for frequency and urgency. Musculoskeletal: Negative for joint pain and myalgias. Skin: Positive for rash. Neurological: Negative for dizziness, tingling, sensory change, weakness and headaches. Psychiatric/Behavioral: Negative for depression, memory loss and substance abuse. PHYSICAL EXAM   NO VITALS WERE TAKEN FOR THIS VISIT  Physical Exam  Vitals signs and nursing note reviewed. HENT:      Head: Normocephalic and atraumatic. Right Ear: External ear normal.      Left Ear: External ear normal.   Cardiovascular:      Rate and Rhythm: Normal rate and regular rhythm. Heart sounds: Normal heart sounds. Pulmonary:      Effort: Pulmonary effort is normal.      Breath sounds: Normal breath sounds. Musculoskeletal: Normal range of motion. Skin:     General: Skin is warm and dry. Findings: Rash present. Rash is pustular.       Comments: Post left thigh Neurological:      Mental Status: She is alert and oriented to person, place, and time. Gait: Gait is intact. Psychiatric:         Mood and Affect: Affect normal.         Judgment: Judgment normal.         ( Patient supplied image)     ASSESSMENT/ PLAN   Diagnoses and all orders for this visit:    1. HSV infection      -Prescribed Valtrex 500 mg twice daily for 10 days by Dr. Amy Bocanegra. Encouraged topical lidocaine products and NSAID as needed for pain. 2. S/P laparoscopic cholecystectomy  -     METABOLIC PANEL, COMPREHENSIVE; Future  -     CBC W/O DIFF; Future     3. Mixed hyperlipidemia  -     LIPID PANEL; Future  -     CBC W/O DIFF; Future    4. Acquired hypothyroidism  -     TSH 3RD GENERATION; Future  -     T4, FREE; Future      I was in the office while conducting this encounter. Consent:  She and/or her healthcare decision maker is aware that this patient-initiated Telehealth encounter is a billable service, with coverage as determined by her insurance carrier. She is aware that she may receive a bill and has provided verbal consent to proceed: Yes    This virtual visit was conducted via [employer]. Pursuant to the emergency declaration under the [facility] waiver authority and the [employer] Act, this Virtual  Visit was conducted to reduce the patient's risk of exposure to COVID-19 and provide continuity of care for an established patient. Services were provided through a video synchronous discussion virtually to substitute for in-person clinic visit. Due to this being a TeleHealth evaluation, many elements of the physical examination are unable to be assessed. Total Time: minutes: 21-30 minutes. Disclaimer:  Advised patient to call back or return to office if symptoms worsen/change/persist.  Discussed expected course/resolution/complications of diagnosis in detail with patient.      Medication risks/benefits/alternatives discussed with patient. Patient was given an after visit summary which includes diagnoses, current medications, & vitals. Discussed patient instructions and advised to read to all patient instructions regarding care. Patient expressed understanding with the diagnosis and plan. This note will not be viewable in [address]         Isaiah Merritt NP  8/20/2020        (This document has been electronically signed)

## 2020-08-27 DIAGNOSIS — B00.9 HERPES: Primary | ICD-10-CM

## 2020-08-27 RX ORDER — ACYCLOVIR 400 MG/1
400 TABLET ORAL
Qty: 50 TAB | Refills: 0 | Status: SHIPPED | OUTPATIENT
Start: 2020-08-27 | End: 2020-09-06

## 2020-08-27 NOTE — TELEPHONE ENCOUNTER
Patient medication for valacyclovir is not covered by patient plan   Pharmacy requesting medication alternative of acyclovir  Script written 8/19/2020  Virtual visit 8/20/2020

## 2020-08-31 DIAGNOSIS — R12 HEART BURN: ICD-10-CM

## 2020-08-31 DIAGNOSIS — B00.9 HERPES: ICD-10-CM

## 2020-08-31 RX ORDER — OMEPRAZOLE 40 MG/1
CAPSULE, DELAYED RELEASE ORAL
Qty: 90 CAP | Refills: 0 | Status: SHIPPED | OUTPATIENT
Start: 2020-08-31 | End: 2020-12-08

## 2020-08-31 RX ORDER — VALACYCLOVIR HYDROCHLORIDE 500 MG/1
500 TABLET, FILM COATED ORAL 2 TIMES DAILY
Qty: 20 TAB | Refills: 0 | Status: CANCELLED | OUTPATIENT
Start: 2020-08-31 | End: 2020-09-10

## 2020-08-31 NOTE — TELEPHONE ENCOUNTER
Last office vsiit 8/20/2020 virtual with miguelito jackson  Last med refill aclyclovir written on 8/27/2020

## 2020-09-02 ENCOUNTER — OFFICE VISIT (OUTPATIENT)
Dept: PRIMARY CARE CLINIC | Age: 55
End: 2020-09-02
Payer: COMMERCIAL

## 2020-09-02 VITALS
RESPIRATION RATE: 18 BRPM | DIASTOLIC BLOOD PRESSURE: 90 MMHG | HEIGHT: 67 IN | SYSTOLIC BLOOD PRESSURE: 146 MMHG | WEIGHT: 225 LBS | OXYGEN SATURATION: 97 % | HEART RATE: 90 BPM | BODY MASS INDEX: 35.31 KG/M2 | TEMPERATURE: 97.8 F

## 2020-09-02 DIAGNOSIS — E03.9 ACQUIRED HYPOTHYROIDISM: ICD-10-CM

## 2020-09-02 DIAGNOSIS — I10 ESSENTIAL HYPERTENSION: ICD-10-CM

## 2020-09-02 DIAGNOSIS — Z90.49 S/P LAPAROSCOPIC CHOLECYSTECTOMY: ICD-10-CM

## 2020-09-02 DIAGNOSIS — I10 ESSENTIAL HYPERTENSION: Primary | ICD-10-CM

## 2020-09-02 DIAGNOSIS — Z12.11 COLON CANCER SCREENING: ICD-10-CM

## 2020-09-02 DIAGNOSIS — L24.89 IRRITANT CONTACT DERMATITIS DUE TO OTHER AGENTS: ICD-10-CM

## 2020-09-02 LAB
T4 FREE SERPL-MCNC: 1.6 NG/DL (ref 0.8–1.5)
TSH SERPL DL<=0.05 MIU/L-ACNC: 0.36 UIU/ML (ref 0.36–3.74)

## 2020-09-02 PROCEDURE — 99214 OFFICE O/P EST MOD 30 MIN: CPT | Performed by: INTERNAL MEDICINE

## 2020-09-02 RX ORDER — BISOPROLOL FUMARATE AND HYDROCHLOROTHIAZIDE 5; 6.25 MG/1; MG/1
1 TABLET ORAL DAILY
Qty: 30 TAB | Refills: 0 | Status: SHIPPED | OUTPATIENT
Start: 2020-09-02 | End: 2020-10-01

## 2020-09-02 NOTE — PROGRESS NOTES
Written by Rey Ryan, as dictated by Dr. Antonio Jack MD.    Farnaz Frederick is a 50 y.o. female. HPI  Pt presents today to discuss blisters on her abdomen. She was seen by NP Jac delvalle on 8/20/20 for a rash on her L thigh. He gave her Valtrex for that rash, and it completely resolved. However, she is now having a rash on her abdomen. She recently had a laparoscopic cholecystectomy and is going to college while working full time. She attributes the rash flare ups to her recent stress. Pt's BP is elevated today in office at 150/88, 146/90 on manual repeat in R arm while sitting down. She is feeling anxious from work stress as well. Patient Active Problem List   Diagnosis Code    ADD (attention deficit disorder) F98.8    Hypothyroidism E03.9    Hyperlipidemia E78.5    Hepatitis C B19.20    Osteoarthritis M19.90    Depression with anxiety F41.8    H/O knee surgery Z98.890    Osteochondritis dissecans of left knee M93.262    Symptomatic cholelithiasis K80.20        Current Outpatient Medications on File Prior to Visit   Medication Sig Dispense Refill    omeprazole (PRILOSEC) 40 mg capsule TAKE 1 CAPSULE BY MOUTH EVERY DAY 90 Cap 0    liothyronine (CYTOMEL) 5 mcg tablet Take 5 mcg by mouth daily.  dextroamphetamine-amphetamine (ADDERALL) 30 mg tablet Take 30 mg by mouth two (2) times a day. ONLY TAKES WHEN WORKING      ALPRAZolam (XANAX) 1 mg tablet 0.5 mg three (3) times daily as needed.  acyclovir (ZOVIRAX) 400 mg tablet Take 1 Tab by mouth every four (4) hours (while awake) for 10 days. 50 Tab 0     No current facility-administered medications on file prior to visit. Allergies   Allergen Reactions    Tylenol-Codeine #3 [Acetaminophen-Codeine] Unknown (comments)     Takes hydrocodone at home.  Has used oxycodone at well       Past Medical History:   Diagnosis Date    Arthritis     Colitis     [address] [address] [address]     Family history of skin cancer     GERD (gastroesophageal reflux disease)     Ill-defined condition     ADHD    Ill-defined condition     Hep C    Liver disease     HEPATITIS C- NEVER TREATED    Nausea & vomiting     Psychiatric disorder     ADD    Skin cancer     BCC    Symptomatic cholelithiasis 2020    Thyroid disease     Ulcerative colitis [address] [address]        Past Surgical History:   Procedure Laterality Date    HX CHOLECYSTECTOMY  2020    HX KNEE ARTHROSCOPY      x3- last done 2015 to harvest cartilage    HX ORTHOPAEDIC Left 2016    articular cartilage transplant    HX TONSILLECTOMY  1988       Family History   Problem Relation Age of Onset    Cancer Mother         melanoma    Hypertension Father     Cancer Father         melanoma    Arthritis-osteo Father     No Known Problems Sister     No Known Problems Son     No Known Problems Daughter     No Known Problems Daughter     Anesth Problems Neg Hx        Social History     Socioeconomic History    Marital status:      Spouse name: Not on file    Number of children: Not on file    Years of education: Not on file    Highest education level: Not on file   Occupational History    Not on file   Social Needs    Financial resource strain: Not on file    Food insecurity     Worry: Not on file     Inability: Not on file    Transportation needs     Medical: Not on file     Non-medical: Not on file   Tobacco Use    Smoking status: Former Smoker     Last attempt to quit:      Years since quittin.7    Smokeless tobacco: Former User    Tobacco comment: SMOKES 1 CIG PER MONTH   Substance and Sexual Activity    Alcohol use: No    Drug use: No    Sexual activity: Never   Lifestyle    Physical activity     Days per week: Not on file     Minutes per session: Not on file    Stress: Not on file   Relationships    Social connections     Talks on phone: Not on file     Gets together: Not on file     Attends Mormon service: Not on file     Active member of club or organization: Not on file     Attends meetings of clubs or organizations: Not on file     Relationship status: Not on file    Intimate partner violence     Fear of current or ex partner: Not on file     Emotionally abused: Not on file     Physically abused: Not on file     Forced sexual activity: Not on file   Other Topics Concern    Not on file   Social History Narrative    Not on file       Office Visit on 08/11/2020   Component Date Value Ref Range Status    Lipase 08/11/2020 20  14 - 72 U/L Final    Amylase 08/11/2020 135* 31 - 110 U/L Final    Glucose 08/11/2020 109* 65 - 99 mg/dL Final    BUN 08/11/2020 17  6 - 24 mg/dL Final    Creatinine 08/11/2020 0.66  0.57 - 1.00 mg/dL Final    GFR est non-AA 08/11/2020 101  >59 mL/min/1.73 Final    GFR est AA 08/11/2020 117  >59 mL/min/1.73 Final    BUN/Creatinine ratio 08/11/2020 26* 9 - 23 Final    Sodium 08/11/2020 140  134 - 144 mmol/L Final    Potassium 08/11/2020 4.2  3.5 - 5.2 mmol/L Final    Chloride 08/11/2020 104  96 - 106 mmol/L Final    CO2 08/11/2020 19* 20 - 29 mmol/L Final    Calcium 08/11/2020 9.6  8.7 - 10.2 mg/dL Final    Protein, total 08/11/2020 7.0  6.0 - 8.5 g/dL Final    Albumin 08/11/2020 4.6  3.8 - 4.9 g/dL Final    GLOBULIN, TOTAL 08/11/2020 2.4  1.5 - 4.5 g/dL Final    A-G Ratio 08/11/2020 1.9  1.2 - 2.2 Final    Bilirubin, total 08/11/2020 0.4  0.0 - 1.2 mg/dL Final    Alk.  phosphatase 08/11/2020 76  39 - 117 IU/L Final    AST (SGOT) 08/11/2020 20  0 - 40 IU/L Final    ALT (SGPT) 08/11/2020 16  0 - 32 IU/L Final   Hospital Outpatient Visit on 07/28/2020   Component Date Value Ref Range Status    WBC 07/28/2020 7.8  3.6 - 11.0 K/uL Final    RBC 07/28/2020 4.48  3.80 - 5.20 M/uL Final    HGB 07/28/2020 13.5  11.5 - 16.0 g/dL Final    HCT 07/28/2020 40.5  35.0 - 47.0 % Final    MCV 07/28/2020 90.4  80.0 - 99.0 FL Final    MCH 07/28/2020 30.1  26.0 - 34.0 PG Final    MCHC 07/28/2020 33.3  30.0 - 36.5 g/dL Final    RDW 07/28/2020 12.5  11.5 - 14.5 % Final    PLATELET 26/70/7250 514  150 - 400 K/uL Final    MPV 07/28/2020 9.3  8.9 - 12.9 FL Final    NRBC 07/28/2020 0.0  0  WBC Final    ABSOLUTE NRBC 07/28/2020 0.00  0.00 - 0.01 K/uL Final    NEUTROPHILS 07/28/2020 56  32 - 75 % Final    LYMPHOCYTES 07/28/2020 30  12 - 49 % Final    MONOCYTES 07/28/2020 8  5 - 13 % Final    EOSINOPHILS 07/28/2020 5  0 - 7 % Final    BASOPHILS 07/28/2020 1  0 - 1 % Final    IMMATURE GRANULOCYTES 07/28/2020 0  0.0 - 0.5 % Final    ABS. NEUTROPHILS 07/28/2020 4.5  1.8 - 8.0 K/UL Final    ABS. LYMPHOCYTES 07/28/2020 2.3  0.8 - 3.5 K/UL Final    ABS. MONOCYTES 07/28/2020 0.6  0.0 - 1.0 K/UL Final    ABS. EOSINOPHILS 07/28/2020 0.4  0.0 - 0.4 K/UL Final    ABS. BASOPHILS 07/28/2020 0.1  0.0 - 0.1 K/UL Final    ABS. IMM. GRANS. 07/28/2020 0.0  0.00 - 0.04 K/UL Final    DF 07/28/2020 AUTOMATED    Final    Sodium 07/28/2020 135* 136 - 145 mmol/L Final    Potassium 07/28/2020 4.4  3.5 - 5.1 mmol/L Final    Chloride 07/28/2020 102  97 - 108 mmol/L Final    CO2 07/28/2020 28  21 - 32 mmol/L Final    Anion gap 07/28/2020 5  5 - 15 mmol/L Final    Glucose 07/28/2020 129* 65 - 100 mg/dL Final    BUN 07/28/2020 15  6 - 20 MG/DL Final    Creatinine 07/28/2020 0.75  0.55 - 1.02 MG/DL Final    BUN/Creatinine ratio 07/28/2020 20  12 - 20   Final    GFR est AA 07/28/2020 >60  >60 ml/min/1.73m2 Final    GFR est non-AA 07/28/2020 >60  >60 ml/min/1.73m2 Final    Comment: Estimated GFR is calculated using the IDMS-traceable Modification of Diet in Renal Disease (MDRD) Study equation, reported for both  Americans (GFRAA) and non- Americans (GFRNA), and normalized to 1.73m2 body surface area. The physician must decide which value applies to the patient. The MDRD study equation should only be used in individuals age 25 or older.  It has not been validated for the following: pregnant women, patients with serious comorbid conditions, or on certain medications, or persons with extremes of body size, muscle mass, or nutritional status.  Calcium 07/28/2020 8.4* 8.5 - 10.1 MG/DL Final    Bilirubin, total 07/28/2020 0.4  0.2 - 1.0 MG/DL Final    ALT (SGPT) 07/28/2020 28  12 - 78 U/L Final    AST (SGOT) 07/28/2020 18  15 - 37 U/L Final    Alk. phosphatase 07/28/2020 79  45 - 117 U/L Final    Protein, total 07/28/2020 6.9  6.4 - 8.2 g/dL Final    Albumin 07/28/2020 3.6  3.5 - 5.0 g/dL Final    Globulin 07/28/2020 3.3  2.0 - 4.0 g/dL Final    A-G Ratio 07/28/2020 1.1  1.1 - 2.2   Final    INR 07/28/2020 1.0  0.9 - 1.1   Final    A single therapeutic range for Vit K antagonists may not be optimal for all indications - see June, 2008 issue of Chest, American College of Chest Physicians Evidence-Based Clinical Practice Guidelines, 8th Edition.  Prothrombin time 07/28/2020 10.1  9.0 - 11.1 sec Final    aPTT 07/28/2020 27.9  22.1 - 32.0 sec Final    In addition to factor deficiency, monitoring heparin therapy, etc., evaluation of a prolonged aPTT result should include consideration of preanalytic variables such as heparin flush contamination, specimen integrity issues, etc.    aPTT, therapeutic range 07/28/2020      58.0 - 77.0 SECS Final     Review of Systems   Constitutional: Negative for malaise/fatigue and weight loss. HENT: Negative for congestion and sore throat. Eyes: Positive for blurred vision. Respiratory: Positive for shortness of breath. Negative for cough. Cardiovascular: Negative for chest pain and leg swelling. Gastrointestinal: Negative for constipation and heartburn. Genitourinary: Negative for frequency and urgency. Musculoskeletal: Negative for back pain, joint pain and myalgias. Neurological: Negative for dizziness and headaches. Psychiatric/Behavioral: Negative for depression.  The patient is not nervous/anxious and does not have insomnia. Visit Vitals  /90 (BP 1 Location: Right arm, BP Patient Position: Sitting)   Pulse 90   Temp 97.8 °F (36.6 °C) (Temporal)   Resp 18   Ht 5' 7\" (1.702 m)   Wt 225 lb (102.1 kg)   LMP 04/13/2015   SpO2 97%   BMI 35.24 kg/m²     Physical Exam  Vitals signs and nursing note reviewed. Constitutional:       General: She is not in acute distress. Appearance: Normal appearance. She is well-developed and well-groomed. She is not diaphoretic. HENT:      Right Ear: External ear normal.      Left Ear: External ear normal.   Eyes:      General: No scleral icterus. Right eye: No discharge. Left eye: No discharge. Extraocular Movements: Extraocular movements intact. Neck:      Musculoskeletal: Normal range of motion and neck supple. Cardiovascular:      Rate and Rhythm: Normal rate and regular rhythm. Pulmonary:      Effort: Pulmonary effort is normal.      Breath sounds: Normal breath sounds. No wheezing. Musculoskeletal:      Right lower leg: No edema. Left lower leg: No edema. Lymphadenopathy:      Cervical: No cervical adenopathy. Skin:     Findings: Rash present. Rash is macular (abdomen). Neurological:      Mental Status: She is alert and oriented to person, place, and time. Psychiatric:         Mood and Affect: Mood and affect normal.         Behavior: Behavior normal.       ASSESSMENT and PLAN    ICD-10-CM ICD-9-CM    1. Essential hypertension  I10 401.9 bisoprolol-hydroCHLOROthiazide (ZIAC) 5-6.25 mg per tablet sent to pharmacy. sent to pharmacy. I prescribed Ziac for her high blood pressure. TSH 3RD GENERATION      T4, FREE    Labs drawn in office today. 2. Acquired hypothyroidism  E03.9 244.9 Labs drawn in office today. 3. S/P laparoscopic cholecystectomy  Z90.49 V45.89 Stable, pt is healing well.       4. Irritant contact dermatitis due to other agents  L24.89 692.89 I instructed her to apply hydrocortisone cream to the affected area and avoid exposure to nickel products. 5. Colon cancer screening  Z12.11 V76.51 OCCULT BLOOD IMMUNOASSAY,DIAGNOSTIC    I ordered a stool sample for health maintenance as she refuses colonoscopy. This plan was reviewed with the patient and patient agrees. All questions were answered. This scribe documentation was reviewed by me and accurately reflects the examination and decisions made by me. This note will not be viewable in [address]

## 2020-09-03 NOTE — PROGRESS NOTES
Lugene Reveal, your TSH came back normal but free T4 came back elevated. Please take 1/2 tablet of cytomel on Fridays & Mondays. Whole tablet rest of the week. Repeat blood work in 6 weeks.

## 2020-09-15 LAB
HEMOCCULT STL QL IA: NEGATIVE
SPECIMEN STATUS REPORT, ROLRST: NORMAL

## 2020-10-01 DIAGNOSIS — I10 ESSENTIAL HYPERTENSION: ICD-10-CM

## 2020-10-01 RX ORDER — BISOPROLOL FUMARATE AND HYDROCHLOROTHIAZIDE 5; 6.25 MG/1; MG/1
TABLET ORAL
Qty: 30 TAB | Refills: 0 | Status: SHIPPED | OUTPATIENT
Start: 2020-10-01 | End: 2020-11-10 | Stop reason: SDUPTHER

## 2020-10-29 ENCOUNTER — TELEPHONE (OUTPATIENT)
Dept: PRIMARY CARE CLINIC | Age: 55
End: 2020-10-29

## 2020-10-29 NOTE — TELEPHONE ENCOUNTER
Pt calling to speak with Dr. Jonnathan Glass, regards to her daughter passing away. Please give pt a call as soon as possible.

## 2020-10-29 NOTE — TELEPHONE ENCOUNTER
Lvm for return call advised that Dr Marlen Michelle was out of the office but would forward message.  Advised that she should schedule appointment to see provider

## 2020-11-06 ENCOUNTER — OFFICE VISIT (OUTPATIENT)
Dept: PRIMARY CARE CLINIC | Age: 55
End: 2020-11-06
Payer: COMMERCIAL

## 2020-11-06 VITALS
SYSTOLIC BLOOD PRESSURE: 118 MMHG | RESPIRATION RATE: 16 BRPM | DIASTOLIC BLOOD PRESSURE: 90 MMHG | WEIGHT: 223.2 LBS | HEIGHT: 67 IN | BODY MASS INDEX: 35.03 KG/M2 | TEMPERATURE: 99.1 F | HEART RATE: 96 BPM

## 2020-11-06 DIAGNOSIS — F43.21 GRIEF REACTION: Primary | ICD-10-CM

## 2020-11-06 DIAGNOSIS — I10 ESSENTIAL HYPERTENSION: ICD-10-CM

## 2020-11-06 DIAGNOSIS — E03.9 ACQUIRED HYPOTHYROIDISM: ICD-10-CM

## 2020-11-06 DIAGNOSIS — F41.9 ANXIETY: ICD-10-CM

## 2020-11-06 PROCEDURE — 99214 OFFICE O/P EST MOD 30 MIN: CPT | Performed by: INTERNAL MEDICINE

## 2020-11-06 RX ORDER — DIAZEPAM 2 MG/1
2 TABLET ORAL
Qty: 12 TAB | Refills: 0 | Status: SHIPPED | OUTPATIENT
Start: 2020-11-06 | End: 2020-11-18 | Stop reason: DRUGHIGH

## 2020-11-06 NOTE — PROGRESS NOTES
Chief Complaint   Patient presents with    Anxiety     patient recently had loss of daughter and is dealing with some anxiety of the whole situation

## 2020-11-06 NOTE — PROGRESS NOTES
Written by Remigio Booker, as dictated by Dr. North Jacobs MD.    Emery Arevalo is a 50 y.o. female. HPI  Pt presents today to complete paperwork for short-term disability. She is grieving the recent loss of her daughter, and it has been a very difficult time for her and her family. She works for [employer], and they have been very good to her through this whole process. However, she is not in a good place to go to work right now and needs to be able to take time to see a therapist and go through grieving process. She has been trying to see a counselor virtually for grief counseling and for anxiety and depression, but she cannot get an appt soon and inquires who she should talk to. She is currently feeling as though the situation has not quite fully hit her and that it will not for a little while. However, she does notice that she is feeling a little worse each day and that her anxiety and depression are increasing. She has xanax which she can take for insomnia, but she inquires if she can take valium PRN if she needs something during the daytime since xanax puts her to sleep. 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  dextroamphetamine-amphetamine (ADDERALL) 30 mg tablet Take 30 mg by mouth two (2) times a day.  ONLY TAKES WHEN WORKING      [DISCONTINUED] ALPRAZolam (XANAX) 1 mg tablet 0.5 mg three (3) times daily as needed. No current facility-administered medications on file prior to visit. Allergies   Allergen Reactions    Tylenol-Codeine #3 [Acetaminophen-Codeine] Unknown (comments)     Takes hydrocodone at home.  Has used oxycodone at well       Past Medical History:   Diagnosis Date    Arthritis     Colitis     [address] ulcerative ([facility] [address]     Family history of skin cancer     GERD (gastroesophageal reflux disease)     Ill-defined condition     ADHD    Ill-defined condition     Hep C    Liver disease 2004    HEPATITIS C- NEVER TREATED    Nausea & vomiting     Psychiatric disorder     ADD    Skin cancer     BCC    Symptomatic cholelithiasis 7/27/2020    Thyroid disease     Ulcerative colitis ([facility] [address]        Past Surgical History:   Procedure Laterality Date    HX CHOLECYSTECTOMY  07/30/2020    HX KNEE ARTHROSCOPY      x3- last done 9/2015 to harvest cartilage    HX ORTHOPAEDIC Left 1/14/2016    articular cartilage transplant    HX TONSILLECTOMY  1988       Family History   Problem Relation Age of Onset    Cancer Mother         melanoma    Hypertension Father     Cancer Father         melanoma    Arthritis-osteo Father     No Known Problems Sister     No Known Problems Son     No Known Problems Daughter     No Known Problems Daughter     Anesth Problems Neg Hx        Social History     Socioeconomic History    Marital status:      Spouse name: Not on file    Number of children: Not on file    Years of education: Not on file    Highest education level: Not on file   Occupational History    Not on file   Social Needs    Financial resource strain: Not on file    Food insecurity     Worry: Not on file     Inability: Not on file    Transportation needs     Medical: Not on file     Non-medical: Not on file   Tobacco Use    Smoking status: Former Smoker     Last attempt to quit: 1976 Years since quittin.7    Smokeless tobacco: Former User    Tobacco comment: SMOKES 1 CIG PER MONTH   Substance and Sexual Activity    Alcohol use: No    Drug use: No    Sexual activity: Never   Lifestyle    Physical activity     Days per week: Not on file     Minutes per session: Not on file    Stress: Not on file   Relationships    Social connections     Talks on phone: Not on file     Gets together: Not on file     Attends Yazidism service: Not on file     Active member of club or organization: Not on file     Attends meetings of clubs or organizations: Not on file     Relationship status: Not on file    Intimate partner violence     Fear of current or ex partner: Not on file     Emotionally abused: Not on file     Physically abused: Not on file     Forced sexual activity: Not on file   Other Topics Concern    Not on file   Social History Narrative    Not on file       Orders Only on 2020   Component Date Value Ref Range Status    T4, Free 2020 1.6* 0.8 - 1.5 NG/DL Final    TSH 2020 0.36  0.36 - 3.74 uIU/mL Final    Comment:   Due to TSH heterogeneity, both structurally and degree of glycosylation,  monoclonal antibodies used in the TSH assay may not accurately quantitate TSH. Therefore, this result should be correlated with clinical findings as well as  with other assessments of thyroid function, e.g., free T4, free T3. Office Visit on 2020   Component Date Value Ref Range Status    Occult blood fecal, by [place] 2020 Negative  Negative Final    SPECIMEN STATUS REPORT 2020 COMMENT   Final    Comment: Please note  Please note  The date and/or time of collection was not indicated on the  requisition as required by state and federal law. The date of  receipt of the specimen was used as the collection date if not  supplied.      Office Visit on 2020   Component Date Value Ref Range Status    Lipase 2020 20  14 - 72 U/L Final    Amylase 2020 135* 31 - 110 U/L Final    Glucose 08/11/2020 109* 65 - 99 mg/dL Final    BUN 08/11/2020 17  6 - 24 mg/dL Final    Creatinine 08/11/2020 0.66  0.57 - 1.00 mg/dL Final    GFR est non-AA 08/11/2020 101  >59 mL/min/1.73 Final    GFR est AA 08/11/2020 117  >59 mL/min/1.73 Final    BUN/Creatinine ratio 08/11/2020 26* 9 - 23 Final    Sodium 08/11/2020 140  134 - 144 mmol/L Final    Potassium 08/11/2020 4.2  3.5 - 5.2 mmol/L Final    Chloride 08/11/2020 104  96 - 106 mmol/L Final    CO2 08/11/2020 19* 20 - 29 mmol/L Final    Calcium 08/11/2020 9.6  8.7 - 10.2 mg/dL Final    Protein, total 08/11/2020 7.0  6.0 - 8.5 g/dL Final    Albumin 08/11/2020 4.6  3.8 - 4.9 g/dL Final    GLOBULIN, TOTAL 08/11/2020 2.4  1.5 - 4.5 g/dL Final    A-G Ratio 08/11/2020 1.9  1.2 - 2.2 Final    Bilirubin, total 08/11/2020 0.4  0.0 - 1.2 mg/dL Final    Alk. phosphatase 08/11/2020 76  39 - 117 IU/L Final    AST (SGOT) 08/11/2020 20  0 - 40 IU/L Final    ALT (SGPT) 08/11/2020 16  0 - 32 IU/L Final     Review of Systems   Constitutional: Negative for malaise/fatigue and weight loss. HENT: Negative for congestion and sore throat. Eyes: Negative for blurred vision. Respiratory: Negative for cough and shortness of breath. Cardiovascular: Negative for chest pain and leg swelling. Gastrointestinal: Negative for constipation and heartburn. Genitourinary: Negative for frequency and urgency. Musculoskeletal: Negative for back pain, joint pain and myalgias. Neurological: Negative for dizziness and headaches. Psychiatric/Behavioral: Negative for depression. The patient is nervous/anxious.  The patient does not have insomnia.         +grief reaction     Visit Vitals  BP (!) 118/90 (BP 1 Location: Left arm, BP Patient Position: Sitting)   Pulse 96   Temp 99.1 °F (37.3 °C) (Oral)   Resp 16   Ht 5' 7\" (1.702 m)   Wt 223 lb 3.2 oz (101.2 kg)   LMP 04/13/2015   BMI 34.96 kg/m²     Physical Exam  Vitals signs and nursing note reviewed. Constitutional:       General: She is not in acute distress. Appearance: Normal appearance. She is well-developed and well-groomed. She is not diaphoretic. HENT:      Right Ear: External ear normal.      Left Ear: External ear normal.   Eyes:      General: No scleral icterus. Right eye: No discharge. Left eye: No discharge. Extraocular Movements: Extraocular movements intact. Neck:      Musculoskeletal: Normal range of motion and neck supple. Cardiovascular:      Rate and Rhythm: Normal rate and regular rhythm. Pulmonary:      Effort: Pulmonary effort is normal.      Breath sounds: Normal breath sounds. No wheezing. Musculoskeletal:      Right lower leg: No edema. Left lower leg: No edema. Lymphadenopathy:      Cervical: No cervical adenopathy. Neurological:      Mental Status: She is alert and oriented to person, place, and time. Psychiatric:         Mood and Affect: Mood and affect normal.         Behavior: Behavior normal.       ASSESSMENT and PLAN    ICD-10-CM ICD-9-CM        1. Grief reaction  F43.21 309.0 REFERRAL TO PSYCHOLOGY    I provided a referral to Dr. Viktoriya Johnson. She should contact me if she is not able to get an appt soon. diazePAM (Valium) 2 mg tablet sent to pharmacy. Potential side effects were discussed. I prescribed Valium.  reviewed. 2. Anxiety  F41.9 300.00 REFERRAL TO PSYCHOLOGY    I provided a referral to Dr. Viktoriya Johnson. She should contact me if she is not able to get an appt soon. diazePAM (Valium) 2 mg tablet sent to pharmacy. Potential side effects were discussed. I prescribed Valium.  reviewed. She will not take Xanax while on xanax. 3. Essential hypertension  I10 401.9 BP is well-controlled on current medication. No change to dosage at this time. 4. Acquired hypothyroidism  E03.9 244.9 TSH 3RD GENERATION    Check TSH. This plan was reviewed with the patient and patient agrees.  All questions were answered. This scribe documentation was reviewed by me and accurately reflects the examination and decisions made by me.

## 2020-11-10 DIAGNOSIS — I10 ESSENTIAL HYPERTENSION: ICD-10-CM

## 2020-11-11 RX ORDER — BISOPROLOL FUMARATE AND HYDROCHLOROTHIAZIDE 5; 6.25 MG/1; MG/1
1 TABLET ORAL DAILY
Qty: 90 TAB | Refills: 0 | Status: SHIPPED | OUTPATIENT
Start: 2020-11-11 | End: 2021-02-06

## 2020-11-16 DIAGNOSIS — F43.21 GRIEF REACTION: ICD-10-CM

## 2020-11-16 DIAGNOSIS — F41.9 ANXIETY: ICD-10-CM

## 2020-11-18 DIAGNOSIS — F51.02 ADJUSTMENT INSOMNIA: Primary | ICD-10-CM

## 2020-11-18 RX ORDER — DIAZEPAM 2 MG/1
2 TABLET ORAL
Qty: 12 TAB | Refills: 0 | OUTPATIENT
Start: 2020-11-18

## 2020-11-18 RX ORDER — DIAZEPAM 5 MG/1
5 TABLET ORAL
Qty: 12 TAB | Refills: 0 | Status: SHIPPED | OUTPATIENT
Start: 2020-11-18 | End: 2021-03-04 | Stop reason: SDUPTHER

## 2020-11-19 ENCOUNTER — DOCUMENTATION ONLY (OUTPATIENT)
Dept: SURGERY | Age: 55
End: 2020-11-19

## 2020-11-19 NOTE — PROGRESS NOTES
Received request for refill for Tramadol 50mg tablets (20 tabs). Denied by Jude Zamorano NP. Fax sent to [facility]. Confirmation page received. Placed to be scanned.

## 2020-12-08 DIAGNOSIS — R12 HEART BURN: ICD-10-CM

## 2020-12-08 RX ORDER — OMEPRAZOLE 40 MG/1
CAPSULE, DELAYED RELEASE ORAL
Qty: 90 CAP | Refills: 0 | Status: SHIPPED | OUTPATIENT
Start: 2020-12-08 | End: 2021-03-14

## 2021-01-15 ENCOUNTER — OFFICE VISIT (OUTPATIENT)
Dept: PRIMARY CARE CLINIC | Age: 56
End: 2021-01-15
Payer: COMMERCIAL

## 2021-01-15 VITALS
SYSTOLIC BLOOD PRESSURE: 99 MMHG | BODY MASS INDEX: 35.79 KG/M2 | WEIGHT: 228 LBS | HEIGHT: 67 IN | RESPIRATION RATE: 16 BRPM | DIASTOLIC BLOOD PRESSURE: 70 MMHG | HEART RATE: 88 BPM | OXYGEN SATURATION: 97 % | TEMPERATURE: 97.1 F

## 2021-01-15 DIAGNOSIS — E03.9 ACQUIRED HYPOTHYROIDISM: ICD-10-CM

## 2021-01-15 DIAGNOSIS — F43.10 PTSD (POST-TRAUMATIC STRESS DISORDER): ICD-10-CM

## 2021-01-15 DIAGNOSIS — F43.81 GRIEF REACTION WITH PROLONGED BEREAVEMENT: ICD-10-CM

## 2021-01-15 DIAGNOSIS — I10 ESSENTIAL HYPERTENSION: Primary | ICD-10-CM

## 2021-01-15 DIAGNOSIS — F90.1 ATTENTION DEFICIT HYPERACTIVITY DISORDER (ADHD), PREDOMINANTLY HYPERACTIVE TYPE: ICD-10-CM

## 2021-01-15 PROCEDURE — 99214 OFFICE O/P EST MOD 30 MIN: CPT | Performed by: INTERNAL MEDICINE

## 2021-01-15 RX ORDER — SERTRALINE HYDROCHLORIDE 25 MG/1
25 TABLET, FILM COATED ORAL DAILY
Qty: 30 TAB | Refills: 0 | Status: SHIPPED | OUTPATIENT
Start: 2021-01-15 | End: 2021-01-27 | Stop reason: DRUGHIGH

## 2021-01-15 NOTE — PROGRESS NOTES
Chief Complaint   Patient presents with    Depression     is seeing two grief councelors, is not wanting to leave the house and still cannot go into her room but this is a life long grief process and now she is not wanting to get out of her face
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
## 2021-01-27 ENCOUNTER — OFFICE VISIT (OUTPATIENT)
Dept: PRIMARY CARE CLINIC | Age: 56
End: 2021-01-27
Payer: COMMERCIAL

## 2021-01-27 VITALS
DIASTOLIC BLOOD PRESSURE: 77 MMHG | OXYGEN SATURATION: 99 % | SYSTOLIC BLOOD PRESSURE: 108 MMHG | TEMPERATURE: 97.5 F | WEIGHT: 226 LBS | HEART RATE: 68 BPM | RESPIRATION RATE: 18 BRPM | HEIGHT: 67 IN | BODY MASS INDEX: 35.47 KG/M2

## 2021-01-27 DIAGNOSIS — I10 ESSENTIAL HYPERTENSION: ICD-10-CM

## 2021-01-27 DIAGNOSIS — F32.9 REACTIVE DEPRESSION: ICD-10-CM

## 2021-01-27 DIAGNOSIS — E03.9 ACQUIRED HYPOTHYROIDISM: ICD-10-CM

## 2021-01-27 DIAGNOSIS — F43.81 GRIEF REACTION WITH PROLONGED BEREAVEMENT: Primary | ICD-10-CM

## 2021-01-27 PROCEDURE — 99213 OFFICE O/P EST LOW 20 MIN: CPT | Performed by: INTERNAL MEDICINE

## 2021-01-27 RX ORDER — SERTRALINE HYDROCHLORIDE 50 MG/1
50 TABLET, FILM COATED ORAL DAILY
Qty: 90 TAB | Refills: 0 | Status: SHIPPED | OUTPATIENT
Start: 2021-01-27 | End: 2021-04-05 | Stop reason: ALTCHOICE

## 2021-01-27 NOTE — PROGRESS NOTES
Written by Avani Herrera, as dictated by Dr. Daniel Mccord MD.    Deepak Cherry (: 1965) is a 50 y.o. female, established patient, here for evaluation of the following chief complaint(s):  Follow-up (new medication)     SUBJECTIVE/OBJECTIVE:  HPI  Pt presents today for follow up. She is going to [employer] and is also seeing Valentino Starcher at [employer]. When she started Zoloft, she felt better for the first few days but then leveled out. She did increase her dose to 50 mg every day and is continuing with it, but she is still having a lot of difficulty. Her therapists have been working with her and explained that this is a normal way for her to be 3 months into the grieving process. One of her therapists referred her to psychiatry and a lot of them are booked, but she does plan to get an appt somewhere. She is involved in a lot of grief groups and has been reading blogs online to learn about other people's experiences. She is trying to decide what to do for her leave from work once her short term FMLA ends. She would like to complete paperwork to take intermittent leave so she can still be around people and have some sense of normalcy. She plans to return to work on 21 and works from 10AM-9PM on , , , and . 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  Max Daily Amount: 10 mg. 12 Tab 0    bisoprolol-hydroCHLOROthiazide (ZIAC) 5-6.25 mg per tablet Take 1 Tab by mouth daily for 90 days. 90 Tab 0    liothyronine (CYTOMEL) 5 mcg tablet Take 5 mcg by mouth daily.  dextroamphetamine-amphetamine (ADDERALL) 30 mg tablet Take 30 mg by mouth two (2) times a day. ONLY TAKES WHEN WORKING      [DISCONTINUED] sertraline (ZOLOFT) 25 mg tablet Take 1 Tab by mouth daily for 30 days. 30 Tab 0     No current facility-administered medications on file prior to visit. Allergies   Allergen Reactions    Tylenol-Codeine #3 [Acetaminophen-Codeine] Unknown (comments)     Takes hydrocodone at home.  Has used oxycodone at well       Past Medical History:   Diagnosis Date    Arthritis     Colitis     [address]     Family history of skin cancer     GERD (gastroesophageal reflux disease)     Ill-defined condition     ADHD    Ill-defined condition     Hep C    Liver disease 2004    HEPATITIS C- NEVER TREATED    Nausea & vomiting     Psychiatric disorder     ADD    Skin cancer     BCC    Symptomatic cholelithiasis 7/27/2020    Thyroid disease     Ulcerative colitis ([facility]        Past Surgical History:   Procedure Laterality Date    HX CHOLECYSTECTOMY  07/30/2020    HX KNEE ARTHROSCOPY      x3- last done 9/2015 to harvest cartilage    HX ORTHOPAEDIC Left 1/14/2016    articular cartilage transplant    HX TONSILLECTOMY  1988       Family History   Problem Relation Age of Onset    Cancer Mother         melanoma    Hypertension Father     Cancer Father         melanoma    Arthritis-osteo Father     No Known Problems Sister     No Known Problems Son     No Known Problems Daughter     No Known Problems Daughter     Anesth Problems Neg Hx        Social History     Socioeconomic History    Marital status:      Spouse name: Not on file    Number of children: Not on file    Years of education: Not on file    Highest education level: Not on file   Occupational History    Not on file   Social Needs    Financial resource strain: Not on file    Food insecurity     Worry: Not on file     Inability: Not on file    Transportation needs     Medical: Not on file     Non-medical: Not on file   Tobacco Use    Smoking status: Former Smoker     Quit date:      Years since quittin.1    Smokeless tobacco: Former User    Tobacco comment: SMOKES 1 CIG PER MONTH   Substance and Sexual Activity    Alcohol use: No    Drug use: No    Sexual activity: Never   Lifestyle    Physical activity     Days per week: Not on file     Minutes per session: Not on file    Stress: Not on file   Relationships    Social connections     Talks on phone: Not on file     Gets together: Not on file     Attends Pentecostalism service: Not on file     Active member of club or organization: Not on file     Attends meetings of clubs or organizations: Not on file     Relationship status: Not on file    Intimate partner violence     Fear of current or ex partner: Not on file     Emotionally abused: Not on file     Physically abused: Not on file     Forced sexual activity: Not on file   Other Topics Concern    Not on file   Social History Narrative    Not on file       Orders Only on 01/15/2021   Component Date Value Ref Range Status    T4, Free 01/15/2021 1.3  0.8 - 1.5 NG/DL Final    TSH 01/15/2021 2.40  0.36 - 3.74 uIU/mL Final    Comment:   Due to TSH heterogeneity, both structurally and degree of glycosylation,  monoclonal antibodies used in the TSH assay may not accurately quantitate TSH. Therefore, this result should be correlated with clinical findings as well as  with other assessments of thyroid function, e.g., free T4, free T3. Review of Systems   Constitutional: Negative for activity change, fatigue and unexpected weight change. HENT: Negative for congestion, hearing loss, rhinorrhea and sore throat. Eyes: Negative for discharge.    Respiratory: Negative for cough, chest tightness and shortness of breath. Cardiovascular: Negative for leg swelling. Gastrointestinal: Negative for abdominal pain, constipation and diarrhea. Genitourinary: Negative for dysuria, flank pain, frequency and urgency. Musculoskeletal: Negative for arthralgias, back pain and myalgias. Skin: Negative for color change and rash. Neurological: Negative for dizziness, light-headedness and headaches. Psychiatric/Behavioral: Positive for dysphoric mood (grief reaction). Negative for sleep disturbance. The patient is not nervous/anxious. Visit Vitals  /77 (BP 1 Location: Left arm, BP Patient Position: Sitting)   Pulse 68   Temp 97.5 °F (36.4 °C) (Temporal)   Resp 18   Ht 5' 7\" (1.702 m)   Wt 226 lb (102.5 kg)   LMP 04/13/2015   SpO2 99%   BMI 35.40 kg/m²     Physical Exam  Vitals signs and nursing note reviewed. Constitutional:       General: She is not in acute distress. Appearance: Normal appearance. She is well-developed. She is not diaphoretic. HENT:      Right Ear: External ear normal.      Left Ear: External ear normal.   Eyes:      General: No scleral icterus. Right eye: No discharge. Left eye: No discharge. Extraocular Movements: Extraocular movements intact. Conjunctiva/sclera: Conjunctivae normal.   Neck:      Musculoskeletal: Normal range of motion and neck supple. Cardiovascular:      Rate and Rhythm: Normal rate and regular rhythm. Pulmonary:      Effort: Pulmonary effort is normal.      Breath sounds: Normal breath sounds. No wheezing. Abdominal:      General: Bowel sounds are normal.      Palpations: Abdomen is soft. Tenderness: There is no abdominal tenderness. Lymphadenopathy:      Cervical: No cervical adenopathy. Neurological:      Mental Status: She is alert and oriented to person, place, and time.    Psychiatric:         Mood and Affect: Mood and affect normal.         ASSESSMENT/PLAN:  1. Grief reaction with prolonged bereavement  -     sertraline (ZOLOFT) 50 mg tablet; Take 1 Tab by mouth daily for 90 days. , Normal, Disp-90 Tab, R-0 sent to pharmacy. I refilled her Zoloft and agree that it is a good idea to go back to work with intermittent leave. She should get her paperwork and send it in to me before scheduling a virtual appt to complete it. 2. Acquired hypothyroidism  Continues on cytomel 5 mcg every day. She should follow up in 6 months so I can check her labs. 3. Reactive depression  -     sertraline (ZOLOFT) 50 mg tablet; Take 1 Tab by mouth daily for 90 days. , Normal, Disp-90 Tab, R-0 sent to pharmacy. I refilled her Zoloft. 4. Essential hypertension  BP is well-controlled on current medication. No change to dosage at this time. I instructed her to follow up in 6 months so I can check her labs and her BP. This plan was reviewed with the patient and patient agrees. All questions were answered. This scribe documentation was reviewed by me and accurately reflects the examination and decisions made by me. An electronic signature was used to authenticate this note.   -- Rajni Villegas

## 2021-02-06 DIAGNOSIS — I10 ESSENTIAL HYPERTENSION: ICD-10-CM

## 2021-02-06 RX ORDER — BISOPROLOL FUMARATE AND HYDROCHLOROTHIAZIDE 5; 6.25 MG/1; MG/1
TABLET ORAL
Qty: 90 TAB | Refills: 0 | Status: SHIPPED | OUTPATIENT
Start: 2021-02-06 | End: 2021-05-13

## 2021-02-17 ENCOUNTER — TELEPHONE (OUTPATIENT)
Dept: PRIMARY CARE CLINIC | Age: 56
End: 2021-02-17

## 2021-02-17 NOTE — TELEPHONE ENCOUNTER
FMLA documentation faxed to [facility] to attention [facility]

## 2021-03-04 DIAGNOSIS — F51.02 ADJUSTMENT INSOMNIA: ICD-10-CM

## 2021-03-05 RX ORDER — DIAZEPAM 5 MG/1
5 TABLET ORAL
Qty: 12 TAB | Refills: 0 | Status: SHIPPED | OUTPATIENT
Start: 2021-03-05 | End: 2021-06-16 | Stop reason: ALTCHOICE

## 2021-03-14 DIAGNOSIS — R12 HEART BURN: ICD-10-CM

## 2021-03-14 RX ORDER — OMEPRAZOLE 40 MG/1
CAPSULE, DELAYED RELEASE ORAL
Qty: 90 CAP | Refills: 0 | Status: SHIPPED | OUTPATIENT
Start: 2021-03-14 | End: 2021-07-12

## 2021-03-14 RX ORDER — LEVOTHYROXINE SODIUM 175 UG/1
TABLET ORAL
Qty: 90 TAB | Refills: 1 | OUTPATIENT
Start: 2021-03-14

## 2021-04-02 ENCOUNTER — TELEPHONE (OUTPATIENT)
Dept: PRIMARY CARE CLINIC | Age: 56
End: 2021-04-02

## 2021-04-02 NOTE — TELEPHONE ENCOUNTER
Call placed to patient to verify if she actually needs this rx refilled. Left voice message to return call.

## 2021-04-05 ENCOUNTER — VIRTUAL VISIT (OUTPATIENT)
Dept: PRIMARY CARE CLINIC | Age: 56
End: 2021-04-05
Payer: COMMERCIAL

## 2021-04-05 DIAGNOSIS — F43.21 GRIEF REACTION: ICD-10-CM

## 2021-04-05 DIAGNOSIS — I10 ESSENTIAL HYPERTENSION: Primary | ICD-10-CM

## 2021-04-05 DIAGNOSIS — F41.9 ANXIETY: ICD-10-CM

## 2021-04-05 DIAGNOSIS — E03.9 ACQUIRED HYPOTHYROIDISM: ICD-10-CM

## 2021-04-05 PROCEDURE — 99213 OFFICE O/P EST LOW 20 MIN: CPT | Performed by: INTERNAL MEDICINE

## 2021-04-05 RX ORDER — SERTRALINE HYDROCHLORIDE 100 MG/1
100 TABLET, FILM COATED ORAL DAILY
Qty: 90 TAB | Refills: 0 | Status: SHIPPED | OUTPATIENT
Start: 2021-04-05 | End: 2021-08-12
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
## 2021-05-13 DIAGNOSIS — I10 ESSENTIAL HYPERTENSION: ICD-10-CM

## 2021-05-13 RX ORDER — BISOPROLOL FUMARATE AND HYDROCHLOROTHIAZIDE 5; 6.25 MG/1; MG/1
TABLET ORAL
Qty: 90 TAB | Refills: 0 | Status: SHIPPED | OUTPATIENT
Start: 2021-05-13 | End: 2021-08-11

## 2021-06-16 ENCOUNTER — OFFICE VISIT (OUTPATIENT)
Dept: SURGERY | Age: 56
End: 2021-06-16
Payer: COMMERCIAL

## 2021-06-16 VITALS
HEIGHT: 67 IN | WEIGHT: 238.5 LBS | BODY MASS INDEX: 37.43 KG/M2 | HEART RATE: 96 BPM | OXYGEN SATURATION: 98 % | TEMPERATURE: 99.7 F | RESPIRATION RATE: 20 BRPM | DIASTOLIC BLOOD PRESSURE: 81 MMHG | SYSTOLIC BLOOD PRESSURE: 113 MMHG

## 2021-06-16 DIAGNOSIS — K59.1 FUNCTIONAL DIARRHEA: Primary | ICD-10-CM

## 2021-06-16 PROBLEM — K80.20 SYMPTOMATIC CHOLELITHIASIS: Status: RESOLVED | Noted: 2020-07-27 | Resolved: 2021-06-16

## 2021-06-16 PROCEDURE — 99212 OFFICE O/P EST SF 10 MIN: CPT | Performed by: SURGERY

## 2021-06-16 RX ORDER — MONTELUKAST SODIUM 4 MG/1
1 TABLET, CHEWABLE ORAL 2 TIMES DAILY
Qty: 60 TABLET | Refills: 0 | Status: SHIPPED | OUTPATIENT
Start: 2021-06-16 | End: 2021-07-16

## 2021-06-16 RX ORDER — ALPRAZOLAM 2 MG/1
TABLET ORAL
COMMUNITY
Start: 2021-06-14 | End: 2022-01-10 | Stop reason: ALTCHOICE

## 2021-06-16 NOTE — PROGRESS NOTES
1. Have you been to the ER, urgent care clinic since your last visit? Hospitalized since your last visit? No    2. Have you seen or consulted any other health care providers outside of the [address] [place] [address] since your last visit? Include any pap smears or colon screening.  No
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
## 2021-06-16 NOTE — LETTER
6/16/2021    Patient: Chari Zepeda   YOB: 1965   Date of Visit: 6/16/2021     Nessa Ortiz MD  [address] [place] [address]  [employer] [employer]    Dear Nessa Ortiz MD,      Thank you for referring Ms. Walter Chiu to [facility] [place] [address] for evaluation. My notes for this consultation are attached. If you have questions, please do not hesitate to call me. I look forward to following your patient along with you.       Sincerely,    Rober Dai MD

## 2021-07-12 DIAGNOSIS — R12 HEART BURN: ICD-10-CM

## 2021-07-12 RX ORDER — OMEPRAZOLE 40 MG/1
CAPSULE, DELAYED RELEASE ORAL
Qty: 90 CAPSULE | Refills: 0 | Status: SHIPPED | OUTPATIENT
Start: 2021-07-12 | End: 2021-10-10

## 2021-07-16 RX ORDER — MONTELUKAST SODIUM 4 MG/1
TABLET, CHEWABLE ORAL
Qty: 60 TABLET | Refills: 0 | Status: SHIPPED | OUTPATIENT
Start: 2021-07-16 | End: 2021-08-12

## 2021-08-03 ENCOUNTER — VIRTUAL VISIT (OUTPATIENT)
Dept: PRIMARY CARE CLINIC | Age: 56
End: 2021-08-03
Payer: COMMERCIAL

## 2021-08-03 DIAGNOSIS — E78.2 MIXED HYPERLIPIDEMIA: ICD-10-CM

## 2021-08-03 DIAGNOSIS — R05.9 COUGH: ICD-10-CM

## 2021-08-03 DIAGNOSIS — E03.9 ACQUIRED HYPOTHYROIDISM: ICD-10-CM

## 2021-08-03 DIAGNOSIS — R50.9 FEVER, UNSPECIFIED FEVER CAUSE: ICD-10-CM

## 2021-08-03 DIAGNOSIS — K59.1 FUNCTIONAL DIARRHEA: ICD-10-CM

## 2021-08-03 DIAGNOSIS — J02.9 SORE THROAT: Primary | ICD-10-CM

## 2021-08-03 PROCEDURE — 99214 OFFICE O/P EST MOD 30 MIN: CPT | Performed by: INTERNAL MEDICINE

## 2021-08-03 NOTE — PROGRESS NOTES
Tenisha Marino (: 1965) is a 49 y.o. female, established patient, here for evaluation of the following chief complaint(s):  Sore Throat   Written by Woodrow Oliveira, as dictated by Dr. Cecilia Avelar MD.      ASSESSMENT/PLAN:  Below is the assessment and plan developed based on review of pertinent history, physical exam, labs, studies, and medications. 1. Sore throat  Symptoms consistent with COVID-19 infection. Mail-in COVID-19 test completed today. Waiting for results. Recommend salt water gargling to alleviate sore throat sxs. 2. Cough  Symptoms consistent with COVID-19 infection. Mail-in COVID-19 test completed today. Waiting for results. Recommend salt water gargling to alleviate sore throat sxs. 3. Fever, unspecified fever cause  Symptoms consistent with COVID-19 infection. Mail-in COVID-19 test completed today. Waiting for results. 4. Functional diarrhea  Continue taking Colestid 1 g as prescribed. Ordered lab work to check metabolic panel.   -     METABOLIC PANEL, COMPREHENSIVE; Future    5. Acquired hypothyroidism  Continue taking Cytomel 5 mcg as prescribed. Ordered lab work to check TSH levels. Waiting for results.   -     TSH 3RD GENERATION; Future    6. Mixed hyperlipidemia  Ordered lab work to check lipid panel. Waiting for results. -     LIPID PANEL; Future      SUBJECTIVE/OBJECTIVE:  HPI  Patient presents today c/o sore throat and severe fatigue x 4 days. She has also recently started coughing. She has not received the COVID-19 vaccine. She gets poor sleep due to anxiety. She takes Xanax prn. She is taking Colestid for diarrhea which is working well. She has hx of cholecystectomy and subsequent bile leak.   Patient Active Problem List   Diagnosis Code    ADD (attention deficit disorder) F98.8    Hypothyroidism E03.9    Hyperlipidemia E78.5    Hepatitis C B19.20    Osteoarthritis M19.90    Depression with anxiety F41.8    H/O knee surgery Z98.890    Osteochondritis dissecans of left knee M93.262    Functional diarrhea K59.1        Current Outpatient Medications on File Prior to Visit   Medication Sig Dispense Refill    colestipoL (COLESTID) 1 gram tablet TAKE 1 TABLET BY MOUTH TWICE DAILY FOR 30 DAYS 60 Tablet 0    omeprazole (PRILOSEC) 40 mg capsule TAKE 1 CAPSULE BY MOUTH EVERY DAY 90 Capsule 0    ALPRAZolam (XANAX) 2 mg tablet TAKE 1/2 TO 1 TABLET BY MOUTH DAILY AS NEEDED FOR ANXIETY OR SLEEP      bisoprolol-hydroCHLOROthiazide (ZIAC) 5-6.25 mg per tablet TAKE 1 TABLET BY MOUTH DAILY 90 Tab 0    liothyronine (CYTOMEL) 5 mcg tablet Take 5 mcg by mouth daily. (Patient not taking: Reported on 6/16/2021)      dextroamphetamine-amphetamine (ADDERALL) 30 mg tablet Take 30 mg by mouth two (2) times a day. ONLY TAKES WHEN WORKING       No current facility-administered medications on file prior to visit. Allergies   Allergen Reactions    Tylenol-Codeine #3 [Acetaminophen-Codeine] Unknown (comments)     Takes hydrocodone at home.  Has used oxycodone at well       Past Medical History:   Diagnosis Date    Arthritis     Colitis     Colitis, ulcerative ([facility] [address]     Family history of skin cancer     Functional diarrhea 6/16/2021    GERD (gastroesophageal reflux disease)     Ill-defined condition     ADHD    Ill-defined condition     Hep C    Liver disease 2004    HEPATITIS C- NEVER TREATED    Nausea & vomiting     Psychiatric disorder     ADD    Skin cancer     BCC    Symptomatic cholelithiasis 7/27/2020    Thyroid disease     Ulcerative colitis ([facility] [address]        Past Surgical History:   Procedure Laterality Date    HX CHOLECYSTECTOMY  07/30/2020    HX KNEE ARTHROSCOPY      x3- last done 9/2015 to harvest cartilage    HX ORTHOPAEDIC Left 1/14/2016    articular cartilage transplant    HX TONSILLECTOMY  1988       Family History   Problem Relation Age of Onset    Cancer Mother         melanoma    Hypertension Father     Cancer Father melanoma    Arthritis-osteo Father     No Known Problems Sister     No Known Problems Son     No Known Problems Daughter     No Known Problems Daughter     Anesth Problems Neg Hx        Social History     Socioeconomic History    Marital status:      Spouse name: Not on file    Number of children: Not on file    Years of education: Not on file    Highest education level: Not on file   Occupational History    Not on file   Tobacco Use    Smoking status: Former Smoker     Quit date:      Years since quittin.6    Smokeless tobacco: Former User    Tobacco comment: SMOKES 1 CIG PER MONTH   Substance and Sexual Activity    Alcohol use: No    Drug use: No    Sexual activity: Never   Other Topics Concern    Not on file   Social History Narrative    Not on file     Social Determinants of Health     Financial Resource Strain:     Difficulty of Paying Living Expenses:    Food Insecurity:     Worried About [employer] [employer] in the Last Year:     [employer] in the Last Year:    Transportation Needs:     Lack of Transportation (Medical):  Lack of Transportation (Non-Medical):    Physical Activity:     Days of Exercise per Week:     Minutes of Exercise per Session:    Stress:     Feeling of Stress :    Social Connections:     Frequency of Communication with Friends and Family:     Frequency of Social Gatherings with Friends and Family:     Attends Holiness Services:     Active Member of Clubs or Organizations:     Attends Club or Organization Meetings:     Marital Status:    Intimate Partner Violence:     Fear of Current or Ex-Partner:     Emotionally Abused:     Physically Abused:     Sexually Abused:        No visits with results within 3 Month(s) from this visit.    Latest known visit with results is:   Orders Only on 01/15/2021   Component Date Value Ref Range Status    T4, Free 01/15/2021 1.3  0.8 - 1.5 NG/DL Final    TSH 01/15/2021 2.40  0.36 - 3.74 uIU/mL Final    Comment:   Due to TSH heterogeneity, both structurally and degree of glycosylation,  monoclonal antibodies used in the TSH assay may not accurately quantitate TSH. Therefore, this result should be correlated with clinical findings as well as  with other assessments of thyroid function, e.g., free T4, free T3. Review of Systems   Constitutional: Positive for fever. Negative for activity change, fatigue and unexpected weight change. HENT: Positive for sore throat. Negative for congestion, hearing loss and rhinorrhea. Eyes: Negative for discharge. Respiratory: Positive for cough. Negative for chest tightness and shortness of breath. Cardiovascular: Negative for leg swelling. Gastrointestinal: Negative for abdominal pain, constipation and diarrhea. Genitourinary: Negative for dysuria, flank pain, frequency and urgency. Musculoskeletal: Negative for arthralgias, back pain and myalgias. Skin: Negative for color change and rash. Neurological: Negative for dizziness, light-headedness and headaches. Psychiatric/Behavioral: Positive for sleep disturbance. Negative for dysphoric mood. The patient is nervous/anxious. Visit Vitals  LMP 04/13/2015      Physical Exam  Vitals and nursing note reviewed. Constitutional:       General: She is not in acute distress. Appearance: Normal appearance. She is well-developed. She is not diaphoretic. HENT:      Right Ear: External ear normal.      Left Ear: External ear normal.   Eyes:      General: No scleral icterus. Right eye: No discharge. Left eye: No discharge. Extraocular Movements: Extraocular movements intact. Conjunctiva/sclera: Conjunctivae normal.   Cardiovascular:      Rate and Rhythm: Normal rate and regular rhythm. Pulmonary:      Effort: Pulmonary effort is normal.      Breath sounds: Normal breath sounds. No wheezing.    Abdominal:      General: Bowel sounds are normal.      Palpations: Abdomen is soft.      Tenderness: There is no abdominal tenderness. Musculoskeletal:      Cervical back: Normal range of motion and neck supple. Lymphadenopathy:      Cervical: No cervical adenopathy. Neurological:      Mental Status: She is alert and oriented to person, place, and time. Psychiatric:         Mood and Affect: Mood and affect normal.           An electronic signature was used to authenticate this note.   -- Mortimer Atta

## 2021-08-11 DIAGNOSIS — I10 ESSENTIAL HYPERTENSION: ICD-10-CM

## 2021-08-11 RX ORDER — BISOPROLOL FUMARATE AND HYDROCHLOROTHIAZIDE 5; 6.25 MG/1; MG/1
TABLET ORAL
Qty: 90 TABLET | Refills: 0 | Status: SHIPPED | OUTPATIENT
Start: 2021-08-11 | End: 2021-12-17

## 2021-08-12 DIAGNOSIS — F43.21 GRIEF REACTION: ICD-10-CM

## 2021-08-12 DIAGNOSIS — F41.9 ANXIETY: ICD-10-CM

## 2021-08-12 RX ORDER — SERTRALINE HYDROCHLORIDE 100 MG/1
TABLET, FILM COATED ORAL
Qty: 90 TABLET | Refills: 0 | Status: SHIPPED | OUTPATIENT
Start: 2021-08-12 | End: 2022-03-20

## 2021-08-12 RX ORDER — MONTELUKAST SODIUM 4 MG/1
TABLET, CHEWABLE ORAL
Qty: 60 TABLET | Refills: 0 | Status: SHIPPED | OUTPATIENT
Start: 2021-08-12 | End: 2021-09-09

## 2021-09-09 RX ORDER — MONTELUKAST SODIUM 4 MG/1
TABLET, CHEWABLE ORAL
Qty: 60 TABLET | Refills: 0 | Status: SHIPPED | OUTPATIENT
Start: 2021-09-09 | End: 2021-10-21

## 2021-10-10 DIAGNOSIS — R12 HEART BURN: ICD-10-CM

## 2021-10-10 RX ORDER — OMEPRAZOLE 40 MG/1
CAPSULE, DELAYED RELEASE ORAL
Qty: 90 CAPSULE | Refills: 0 | Status: SHIPPED | OUTPATIENT
Start: 2021-10-10 | End: 2022-01-16

## 2021-10-21 RX ORDER — MONTELUKAST SODIUM 4 MG/1
TABLET, CHEWABLE ORAL
Qty: 60 TABLET | Refills: 0 | Status: SHIPPED | OUTPATIENT
Start: 2021-10-21 | End: 2021-11-22

## 2021-11-22 RX ORDER — MONTELUKAST SODIUM 4 MG/1
TABLET, CHEWABLE ORAL
Qty: 60 TABLET | Refills: 0 | Status: SHIPPED | OUTPATIENT
Start: 2021-11-22 | End: 2022-01-03

## 2021-12-17 DIAGNOSIS — I10 ESSENTIAL HYPERTENSION: ICD-10-CM

## 2021-12-17 RX ORDER — BISOPROLOL FUMARATE AND HYDROCHLOROTHIAZIDE 5; 6.25 MG/1; MG/1
TABLET ORAL
Qty: 90 TABLET | Refills: 0 | Status: SHIPPED | OUTPATIENT
Start: 2021-12-17 | End: 2022-04-18

## 2022-01-01 NOTE — TELEPHONE ENCOUNTER
"LC consult ordered for five day old admitted to acute pediatrics with an elevated bilirubin. Baby has just been taken out of light therapy when LC entered room.  Baby was admitted yesterday evening.  Mom is a P1 who delivered \"Jt\" at 39.3 wks, weighing 7 # 5 oz. Mom has no know risk factors.   Mom reports that baby is now 6 # 5 oz. which is a 1 lb weight loss. Before  visi,t mom had placed a stooled diaper on the scale and stool is noted to be a thinner green/mec. Mom also reports that she has not yet had any filling to her breasts on day 5. These findings along with elevated bilirubin suggests that baby may not have been transferring effectively from the breast.   Office pediatricain recommended 45 minutes of breast feeding and then supplementing, Parents were sent to have labs drawn and then were admitted to [facility].     Mom feels baby was nursing well prior to see the doctor.Mother does state she can hear swallows.   reviewed current plan of care with parents and placed couplet on a 3 step plan including breast feeding on both side and keeping it to apporoximately 15 minutes of engaged feeding until breastfeeding becomes more established and milk volume increases. FOB will then supplement with 45 mls according to Supplemental feeding guidelines, day of life and weight of baby. Mom will then pump and place on bedside table for next feeding. Mom understand to feed baby 8 or more times in 24 hours.  This  discussed plan with pediatrician on the floor and he is in agreement with couplets feeding plan.  Mom does not have a pump but will rent until insurance pump is sent.  gave parents handouts on supplementing feeding volumes, CDC collection and Storage, outpatient resource flyers and pump information.  Lactation to follow up in the morning to evaluate status.  " Last office visit 1/27/2021  Last med refill 11/18/2020

## 2022-01-03 RX ORDER — MONTELUKAST SODIUM 4 MG/1
TABLET, CHEWABLE ORAL
Qty: 60 TABLET | Refills: 0 | Status: SHIPPED | OUTPATIENT
Start: 2022-01-03 | End: 2022-03-16 | Stop reason: SDUPTHER

## 2022-01-10 ENCOUNTER — VIRTUAL VISIT (OUTPATIENT)
Dept: PRIMARY CARE CLINIC | Age: 57
End: 2022-01-10
Payer: COMMERCIAL

## 2022-01-10 DIAGNOSIS — Z20.822 EXPOSURE TO CONFIRMED CASE OF COVID-19: ICD-10-CM

## 2022-01-10 DIAGNOSIS — E03.9 ACQUIRED HYPOTHYROIDISM: ICD-10-CM

## 2022-01-10 DIAGNOSIS — F51.01 PRIMARY INSOMNIA: Primary | ICD-10-CM

## 2022-01-10 DIAGNOSIS — F43.21 GRIEF REACTION: ICD-10-CM

## 2022-01-10 DIAGNOSIS — B18.2 HEPATITIS C VIRUS CARRIER STATE (HCC): ICD-10-CM

## 2022-01-10 PROCEDURE — 99213 OFFICE O/P EST LOW 20 MIN: CPT | Performed by: INTERNAL MEDICINE

## 2022-01-10 RX ORDER — QUETIAPINE FUMARATE 25 MG/1
25 TABLET, FILM COATED ORAL
Qty: 30 TABLET | Refills: 0 | Status: SHIPPED | OUTPATIENT
Start: 2022-01-10 | End: 2022-02-09

## 2022-01-10 NOTE — PROGRESS NOTES
Oh Rosraio (: 1965) is a 49 y.o. female, established patient, here for evaluation of the following chief complaint(s):   No chief complaint on file. Written by Samantha Kasper, as dictated by Dr. Mahnaz Sanchez MD.      ASSESSMENT/PLAN:  Below is the assessment and plan developed based on review of pertinent history, labs, studies, and medications. 1. Primary insomnia  She has not been sleeping well due to continued grief from losing her daughter. We will start her on Seroquel 25 mg QHS. Potential side effects were discussed. -     QUEtiapine (SEROquel) 25 mg tablet; Take 1 Tablet by mouth nightly for 30 days. , Normal, Disp-30 Tablet, R-0 sent to pharmacy. 2. Grief reaction  She is seeing a therapist and sessions have been helpful. -     QUEtiapine (SEROquel) 25 mg tablet; Take 1 Tablet by mouth nightly for 30 days. , Normal, Disp-30 Tablet, R-0 sent to pharmacy. 3. Exposure to confirmed case of COVID-19  She is planning on getting tested soon. Recommended staying well hydrated and taking Emergen-C (vitamin D, C, and Zinc). If starts experiencing  shortness of breath, chest pain, high grade fever, or dizziness go to the ER. 4. Acquired hypothyroidism  Continue on Cytomel 5 mcg daily. Urged to get labs done soon. 5. Hepatitis C virus carrier state ([facility] [address]  Check hepatitis C QT.   -     HEPATITIS C QT BY PCR WITH REFLEX GENOTYPE; Future    SUBJECTIVE/OBJECTIVE:  HPI   The patient presents virtually today due to exposure to COVID. She has been taking care of her parents who have tested positive for COVID and have been in the hospital. Her father also recently fell and had to get 10 stitches. She has received both doses of the Pfizer vaccine series. Today, she states that she has been feeling SOB, but is unsure if she is just manifesting symptom. She is also c/o trouble sleeping due to grief.  She has been following with her grief counselor and will be starting EDMR therapy soon. She has tried trazodone, Tylenol PM, and melatonin without any improvement. She has a lot of racing thoughts at night. Patient Active Problem List   Diagnosis Code    ADD (attention deficit disorder) F98.8    Hypothyroidism E03.9    Hyperlipidemia E78.5    Hepatitis C B19.20    Osteoarthritis M19.90    Depression with anxiety F41.8    H/O knee surgery Z98.890    Osteochondritis dissecans of left knee M93.262    Functional diarrhea K59.1        Current Outpatient Medications on File Prior to Visit   Medication Sig Dispense Refill    colestipoL (COLESTID) 1 gram tablet TAKE 1 TABLET BY MOUTH TWICE DAILY 60 Tablet 0    bisoprolol-hydroCHLOROthiazide (ZIAC) 5-6.25 mg per tablet TAKE 1 TABLET BY MOUTH DAILY 90 Tablet 0    omeprazole (PRILOSEC) 40 mg capsule TAKE 1 CAPSULE BY MOUTH EVERY DAY 90 Capsule 0    sertraline (ZOLOFT) 100 mg tablet TAKE 1 TABLET BY MOUTH DAILY 90 Tablet 0    [DISCONTINUED] ALPRAZolam (XANAX) 2 mg tablet TAKE 1/2 TO 1 TABLET BY MOUTH DAILY AS NEEDED FOR ANXIETY OR SLEEP      liothyronine (CYTOMEL) 5 mcg tablet Take 5 mcg by mouth daily. (Patient not taking: Reported on 6/16/2021)      dextroamphetamine-amphetamine (ADDERALL) 30 mg tablet Take 30 mg by mouth two (2) times a day. ONLY TAKES WHEN WORKING       No current facility-administered medications on file prior to visit. Allergies   Allergen Reactions    Tylenol-Codeine #3 [Acetaminophen-Codeine] Unknown (comments)     Takes hydrocodone at home.  Has used oxycodone at well       Past Medical History:   Diagnosis Date    Arthritis     Colitis     [facility]itis[facility] ulcerative ([facility] [address]     Family history of skin cancer     Functional diarrhea 6/16/2021    GERD (gastroesophageal reflux disease)     Ill-defined condition     ADHD    Ill-defined condition     Hep C    Liver disease 2004    HEPATITIS C- NEVER TREATED    Nausea & vomiting     Psychiatric disorder     ADD    Skin cancer     BCC    Symptomatic cholelithiasis 2020    Thyroid disease     Ulcerative colitis ([facility] [address]        Past Surgical History:   Procedure Laterality Date    HX CHOLECYSTECTOMY  2020    HX KNEE ARTHROSCOPY      x3- last done 2015 to harvest cartilage    HX ORTHOPAEDIC Left 2016    articular cartilage transplant    HX TONSILLECTOMY  1988       Family History   Problem Relation Age of Onset    Cancer Mother         melanoma    Hypertension Father     Cancer Father         melanoma    OSTEOARTHRITIS Father     No Known Problems Sister     No Known Problems Son     No Known Problems Daughter     No Known Problems Daughter     Anesth Problems Neg Hx        Social History     Socioeconomic History    Marital status:      Spouse name: Not on file    Number of children: Not on file    Years of education: Not on file    Highest education level: Not on file   Occupational History    Not on file   Tobacco Use    Smoking status: Former Smoker     Quit date:      Years since quittin.0    Smokeless tobacco: Former User    Tobacco comment: SMOKES 1 CIG PER MONTH   Substance and Sexual Activity    Alcohol use: No    Drug use: No    Sexual activity: Never   Other Topics Concern    Not on file   Social History Narrative    Not on file       No visits with results within 3 Month(s) from this visit. Latest known visit with results is:   Orders Only on 01/15/2021   Component Date Value Ref Range Status    T4, Free 01/15/2021 1.3  0.8 - 1.5 NG/DL Final    TSH 01/15/2021 2.40  0.36 - 3.74 uIU/mL Final    Comment:   Due to TSH heterogeneity, both structurally and degree of glycosylation,  monoclonal antibodies used in the TSH assay may not accurately quantitate TSH. Therefore, this result should be correlated with clinical findings as well as  with other assessments of thyroid function, e.g., free T4, free T3.         Review of Systems   Constitutional: Negative for activity change, fatigue and unexpected weight change. HENT: Negative for congestion, hearing loss, rhinorrhea and sore throat. Eyes: Negative for discharge. Respiratory: Negative for cough, chest tightness and shortness of breath. Cardiovascular: Negative for leg swelling. Gastrointestinal: Negative for abdominal pain, constipation and diarrhea. Genitourinary: Negative for dysuria, flank pain, frequency and urgency. Musculoskeletal: Negative for arthralgias, back pain and myalgias. Skin: Negative for color change and rash. Neurological: Negative for dizziness, light-headedness and headaches. Psychiatric/Behavioral: Positive for sleep disturbance. Negative for dysphoric mood. The patient is not nervous/anxious.          No data recorded     Physical Exam    [INSTRUCTIONS:  \"[x]\" Indicates a positive item  \"[]\" Indicates a negative item  -- DELETE ALL ITEMS NOT EXAMINED]    Constitutional: [x] Appears well-developed and well-nourished [x] No apparent distress      [] Abnormal -     Mental status: [x] Alert and awake  [x] Oriented to person/place/time [x] Able to follow commands    [] Abnormal -     Eyes:   EOM    [x]  Normal    [] Abnormal -   Sclera  [x]  Normal    [] Abnormal -          Discharge [x]  None visible   [] Abnormal -     HENT: [x] Normocephalic, atraumatic  [] Abnormal -   [x] Mouth/Throat: Mucous membranes are moist    External Ears [x] Normal  [] Abnormal -    Neck: [x] No visualized mass [] Abnormal -     Pulmonary/Chest: [x] Respiratory effort normal   [x] No visualized signs of difficulty breathing or respiratory distress        [] Abnormal -      Musculoskeletal:   [x] Normal gait with no signs of ataxia         [x] Normal range of motion of neck        [] Abnormal -     Neurological:        [x] No Facial Asymmetry (Cranial nerve 7 motor function) (limited exam due to video visit)          [x] No gaze palsy        [] Abnormal -          Skin:        [x] No significant exanthematous lesions or discoloration noted on facial skin         [] Abnormal -            Psychiatric:       [x] Normal Affect [] Abnormal -        [x] No Hallucinations    Other pertinent observable physical exam findings:-      Curry Willis, was evaluated through a synchronous (real-time) audio-video encounter. The patient (or guardian if applicable) is aware that this is a billable service. Verbal consent to proceed has been obtained within the past 12 months. The visit was conducted pursuant to the emergency declaration under the [address] [place] [address] and the [employer]. Patient identification was verified, and a caregiver was present when appropriate. The patient was located in a state where the provider was credentialed to provide care. An electronic signature was used to authenticate this note.   -- Erika Erickson

## 2022-01-16 DIAGNOSIS — R12 HEART BURN: ICD-10-CM

## 2022-01-16 RX ORDER — OMEPRAZOLE 40 MG/1
CAPSULE, DELAYED RELEASE ORAL
Qty: 90 CAPSULE | Refills: 0 | Status: SHIPPED | OUTPATIENT
Start: 2022-01-16 | End: 2022-04-18

## 2022-02-18 ENCOUNTER — PATIENT MESSAGE (OUTPATIENT)
Dept: SURGERY | Age: 57
End: 2022-02-18

## 2022-03-16 DIAGNOSIS — K59.1 FUNCTIONAL DIARRHEA: Primary | ICD-10-CM

## 2022-03-16 RX ORDER — MONTELUKAST SODIUM 4 MG/1
1 TABLET, CHEWABLE ORAL 2 TIMES DAILY
Qty: 60 TABLET | Refills: 0 | Status: SHIPPED | OUTPATIENT
Start: 2022-03-16 | End: 2022-04-18

## 2022-03-16 NOTE — TELEPHONE ENCOUNTER
Patient has scheduled an appointment with Dr. Federico Constantino for 3/28/22 at 2:40pm. Patient would like to know if she can get a refill of just enough medication to get her to the appointment.  Medication: Colestipol  CB: [phone]

## 2022-03-16 NOTE — TELEPHONE ENCOUNTER
I called the patient and I let her know that dr Johnathon Chaparro will renew her RX for Colestipol but she needs to keep her appointment on 3/28/22. Pt in agreement.

## 2022-03-20 DIAGNOSIS — F43.21 GRIEF REACTION: ICD-10-CM

## 2022-03-20 DIAGNOSIS — F41.9 ANXIETY: ICD-10-CM

## 2022-03-20 PROBLEM — K59.1 FUNCTIONAL DIARRHEA: Status: ACTIVE | Noted: 2021-06-16

## 2022-03-20 RX ORDER — SERTRALINE HYDROCHLORIDE 100 MG/1
TABLET, FILM COATED ORAL
Qty: 90 TABLET | Refills: 0 | Status: SHIPPED | OUTPATIENT
Start: 2022-03-20 | End: 2022-05-31 | Stop reason: ALTCHOICE

## 2022-03-28 ENCOUNTER — TELEPHONE (OUTPATIENT)
Dept: SURGERY | Age: 57
End: 2022-03-28

## 2022-03-28 NOTE — TELEPHONE ENCOUNTER
Attempted to call patient about missed appointment with Stewart Kaye today at 2:40pm. Had to leave a voicemail and a no show letter was sent out

## 2022-04-16 DIAGNOSIS — K59.1 FUNCTIONAL DIARRHEA: ICD-10-CM

## 2022-04-18 DIAGNOSIS — R12 HEART BURN: ICD-10-CM

## 2022-04-18 DIAGNOSIS — I10 ESSENTIAL HYPERTENSION: ICD-10-CM

## 2022-04-18 RX ORDER — BISOPROLOL FUMARATE AND HYDROCHLOROTHIAZIDE 5; 6.25 MG/1; MG/1
TABLET ORAL
Qty: 90 TABLET | Refills: 0 | Status: SHIPPED | OUTPATIENT
Start: 2022-04-18 | End: 2022-07-15

## 2022-04-18 RX ORDER — OMEPRAZOLE 40 MG/1
CAPSULE, DELAYED RELEASE ORAL
Qty: 90 CAPSULE | Refills: 0 | Status: SHIPPED | OUTPATIENT
Start: 2022-04-18 | End: 2022-07-15

## 2022-04-18 RX ORDER — MONTELUKAST SODIUM 4 MG/1
TABLET, CHEWABLE ORAL
Qty: 60 TABLET | Refills: 0 | Status: SHIPPED | OUTPATIENT
Start: 2022-04-18 | End: 2022-05-16

## 2022-05-16 DIAGNOSIS — K59.1 FUNCTIONAL DIARRHEA: ICD-10-CM

## 2022-05-16 RX ORDER — MONTELUKAST SODIUM 4 MG/1
TABLET, CHEWABLE ORAL
Qty: 60 TABLET | Refills: 0 | Status: SHIPPED | OUTPATIENT
Start: 2022-05-16 | End: 2022-08-15

## 2022-05-31 ENCOUNTER — OFFICE VISIT (OUTPATIENT)
Dept: PRIMARY CARE CLINIC | Age: 57
End: 2022-05-31
Payer: COMMERCIAL

## 2022-05-31 VITALS
SYSTOLIC BLOOD PRESSURE: 115 MMHG | DIASTOLIC BLOOD PRESSURE: 79 MMHG | RESPIRATION RATE: 14 BRPM | OXYGEN SATURATION: 95 % | HEIGHT: 67 IN | TEMPERATURE: 98.1 F | BODY MASS INDEX: 38.45 KG/M2 | WEIGHT: 245 LBS | HEART RATE: 69 BPM

## 2022-05-31 DIAGNOSIS — Z86.19 HISTORY OF POSITIVE HEPATITIS C: ICD-10-CM

## 2022-05-31 DIAGNOSIS — Z12.31 ENCOUNTER FOR SCREENING MAMMOGRAM FOR MALIGNANT NEOPLASM OF BREAST: ICD-10-CM

## 2022-05-31 DIAGNOSIS — I10 PRIMARY HYPERTENSION: ICD-10-CM

## 2022-05-31 DIAGNOSIS — E78.2 MIXED HYPERLIPIDEMIA: ICD-10-CM

## 2022-05-31 DIAGNOSIS — Z12.11 COLON CANCER SCREENING: ICD-10-CM

## 2022-05-31 DIAGNOSIS — E03.9 ACQUIRED HYPOTHYROIDISM: Primary | ICD-10-CM

## 2022-05-31 DIAGNOSIS — F32.9 REACTIVE DEPRESSION: ICD-10-CM

## 2022-05-31 PROCEDURE — 99214 OFFICE O/P EST MOD 30 MIN: CPT | Performed by: INTERNAL MEDICINE

## 2022-05-31 RX ORDER — LEVOTHYROXINE SODIUM 50 UG/1
50 TABLET ORAL
Qty: 90 TABLET | Refills: 0 | Status: SHIPPED | OUTPATIENT
Start: 2022-05-31 | End: 2022-07-26 | Stop reason: ALTCHOICE

## 2022-05-31 RX ORDER — ARIPIPRAZOLE 5 MG/1
5 TABLET ORAL DAILY
COMMUNITY
Start: 2022-05-11

## 2022-05-31 RX ORDER — ESCITALOPRAM OXALATE 20 MG/1
TABLET ORAL
COMMUNITY
Start: 2022-05-06

## 2022-05-31 NOTE — PROGRESS NOTES
Jessica Leon (: 1965) is a 56 y.o. female, established patient, here for evaluation of the following chief complaint(s):  Agitation and Thyroid Problem (Had labs done at [facility] [place] and thyroid levels were high)     Written by Henri Blanchard, as dictated by Dr. Melani Duong MD.      ASSESSMENT/PLAN:  Below is the assessment and plan developed based on review of pertinent history, physical exam, labs, studies, and medications. 1. Acquired hypothyroidism  Will restart her on levothyroxine at 50 mcg daily. Recheck TSH in 6 weeks and will adjust dose at that time if needed. -     levothyroxine (SYNTHROID) 50 mcg tablet; Take 1 Tablet by mouth Daily (before breakfast) for 90 days. , Normal, Disp-90 Tablet, R-0 sent to pharmacy. -     TSH 3RD GENERATION; Future    2. Reactive depression  Followed by Psychiatry. Continue with Lexapro 20 mg daily and Abilify 5 mg daily. Recommend she ask her psychiatrist if she can increase Abilify to 2 tabs daily to help with her sleep. Continue with grief counseling. 3. Primary hypertension  BP is well controlled on bisoprolol-HCTZ 5-6.25 mg daily. Continue current medication(s). Labs ordered for medication management.   -     METABOLIC PANEL, COMPREHENSIVE; Future  -     CBC W/O DIFF; Future    4. Mixed hyperlipidemia  Recheck lipid panel.   -     LIPID PANEL; Future    5. History of positive hepatitis C  Will check hepatitis C QT.  -     HEPATITIS C QT BY PCR WITH REFLEX GENOTYPE; Future    6. Encounter for screening mammogram for malignant neoplasm of breast  Ordered screening mammogram.  -     [facility] 3D CRISTI W MAMMO BI SCREENING INCL CAD; Future    7. Colon cancer screening  Ordered FIT.   -     OCCULT BLOOD IMMUNOASSAY,DIAGNOSTIC; Future      SUBJECTIVE/OBJECTIVE:  HPI   The patient presents today for a follow-up on chronic conditions. She continues to go through the grief process due to her daughter's death.  Also experiencing grief from her father's death from [facility] in Jan 2022. She has been going to grief counseling and is now followed by a psychiatrist. She is on Lexapro 20 mg daily and Abilify 5 mg daily which have been working well. She is also in [facility]. Notes that she still has trouble sleeping at night. She had an appt last week with Dr. Colton Marie (Ob/Gyn) and had lab work which showed abnormal thyroid levels. She admits that she has not been taking her levothyroxine. Her BP today is good at 115/79. She is on bisoprolol-HCTZ 5-6.25 mg daily for HTN. She is s/p cholecystectomy and states that every time she eats something she will have diarrhea immediately after. She is followed by Dr. Sawyer Plaza (general surgery) who started her on Colestid 1 g BID. Notes that she continues to have diarrhea even on Colestid and needs to follow-up with Dr. Sawyer Plzaa. Patient Active Problem List   Diagnosis Code    ADD (attention deficit disorder) F98.8    Hypothyroidism E03.9    Hyperlipidemia E78.5    Hepatitis C B19.20    Osteoarthritis M19.90    Depression with anxiety F41.8    H/O knee surgery Z98.890    Osteochondritis dissecans of left knee M93.262    Functional diarrhea K59.1        Current Outpatient Medications on File Prior to Visit   Medication Sig Dispense Refill    escitalopram oxalate (LEXAPRO) 20 mg tablet       ARIPiprazole (ABILIFY) 5 mg tablet Take 5 mg by mouth daily.  bisoprolol-hydroCHLOROthiazide (ZIAC) 5-6.25 mg per tablet TAKE 1 TABLET BY MOUTH DAILY 90 Tablet 0    omeprazole (PRILOSEC) 40 mg capsule TAKE 1 CAPSULE BY MOUTH EVERY DAY 90 Capsule 0    dextroamphetamine-amphetamine (ADDERALL) 30 mg tablet Take 30 mg by mouth two (2) times a day.  ONLY TAKES WHEN WORKING      colestipoL (COLESTID) 1 gram tablet TAKE 1 TABLET BY MOUTH TWICE DAILY 60 Tablet 0    [DISCONTINUED] sertraline (ZOLOFT) 100 mg tablet TAKE 1 TABLET BY MOUTH DAILY 90 Tablet 0    [DISCONTINUED] liothyronine (CYTOMEL) 5 mcg tablet Take 5 mcg by mouth daily. (Patient not taking: Reported on 2021)       No current facility-administered medications on file prior to visit. Allergies   Allergen Reactions    Tylenol-Codeine #3 [Acetaminophen-Codeine] Unknown (comments)     Takes hydrocodone at home.  Has used oxycodone at well       Past Medical History:   Diagnosis Date    Arthritis     Colitis     [facility] ulcerative ([facility] [address]     Family history of skin cancer     Functional diarrhea 2021    GERD (gastroesophageal reflux disease)     Ill-defined condition     ADHD    Ill-defined condition     Hep C    Liver disease     HEPATITIS C- NEVER TREATED    Nausea & vomiting     Psychiatric disorder     ADD    Skin cancer     BCC    Symptomatic cholelithiasis 2020    Thyroid disease     Ulcerative colitis ([facility] [address]        Past Surgical History:   Procedure Laterality Date    HX CHOLECYSTECTOMY  2020    HX KNEE ARTHROSCOPY      x3- last done 2015 to harvest cartilage    HX ORTHOPAEDIC Left 2016    articular cartilage transplant    HX TONSILLECTOMY  1988       Family History   Problem Relation Age of Onset    Cancer Mother         melanoma    Hypertension Father     Cancer Father         melanoma    OSTEOARTHRITIS Father     No Known Problems Sister     No Known Problems Son     No Known Problems Daughter     No Known Problems Daughter     Anesth Problems Neg Hx        Social History     Socioeconomic History    Marital status:      Spouse name: Not on file    Number of children: Not on file    Years of education: Not on file    Highest education level: Not on file   Occupational History    Not on file   Tobacco Use    Smoking status: Former Smoker     Quit date:      Years since quittin.4    Smokeless tobacco: Former User    Tobacco comment: SMOKES 1 CIG PER MONTH   Substance and Sexual Activity    Alcohol use: No    Drug use: No    Sexual activity: Never   Other Topics Concern    Not on file   Social History Narrative    Not on file       No visits with results within 3 Month(s) from this visit. Latest known visit with results is:   Orders Only on 01/15/2021   Component Date Value Ref Range Status    T4, Free 01/15/2021 1.3  0.8 - 1.5 NG/DL Final    TSH 01/15/2021 2.40  0.36 - 3.74 uIU/mL Final    Comment:   Due to TSH heterogeneity, both structurally and degree of glycosylation,  monoclonal antibodies used in the TSH assay may not accurately quantitate TSH. Therefore, this result should be correlated with clinical findings as well as  with other assessments of thyroid function, e.g., free T4, free T3. Review of Systems   Constitutional: Negative for activity change, fatigue and unexpected weight change. HENT: Negative for congestion, hearing loss, rhinorrhea and sore throat. Eyes: Negative for discharge. Respiratory: Negative for cough, chest tightness and shortness of breath. Cardiovascular: Negative for leg swelling. Gastrointestinal: Negative for abdominal pain, constipation and diarrhea. Genitourinary: Negative for dysuria, flank pain, frequency and urgency. Musculoskeletal: Negative for arthralgias, back pain and myalgias. Skin: Negative for color change and rash. Neurological: Negative for dizziness, light-headedness and headaches. Psychiatric/Behavioral: Negative for dysphoric mood and sleep disturbance. The patient is not nervous/anxious. Visit Vitals  /79 (BP 1 Location: Right upper arm, BP Patient Position: Sitting)   Pulse 69   Temp 98.1 °F (36.7 °C) (Temporal)   Resp 14   Ht 5' 7\" (1.702 m)   Wt 245 lb (111.1 kg)   LMP 04/13/2015   SpO2 95%   BMI 38.37 kg/m²      Physical Exam  Vitals and nursing note reviewed. Constitutional:       General: She is not in acute distress. Appearance: Normal appearance. She is well-developed. She is not diaphoretic.    HENT:      Right Ear: External ear normal.      Left Ear: External ear normal.   Eyes: General: No scleral icterus. Right eye: No discharge. Left eye: No discharge. Extraocular Movements: Extraocular movements intact. Conjunctiva/sclera: Conjunctivae normal.   Cardiovascular:      Rate and Rhythm: Normal rate and regular rhythm. Pulmonary:      Effort: Pulmonary effort is normal.      Breath sounds: Normal breath sounds. No wheezing. Abdominal:      General: Bowel sounds are normal.      Palpations: Abdomen is soft. Tenderness: There is no abdominal tenderness. Musculoskeletal:      Cervical back: Normal range of motion and neck supple. Lymphadenopathy:      Cervical: No cervical adenopathy. Neurological:      Mental Status: She is alert and oriented to person, place, and time. Psychiatric:         Mood and Affect: Mood and affect normal.       An electronic signature was used to authenticate this note.   -- Alessia Brooke

## 2022-05-31 NOTE — PROGRESS NOTES
1. \"Have you been to the ER, urgent care clinic since your last visit? Hospitalized since your last visit? \" No    2. \"Have you seen or consulted any other health care providers outside of the [address] [place] [address] since your last visit? \" Yes Where: [facility] 5/5 came home 5/11     3. For patients aged 39-70: Has the patient had a colonoscopy / FIT/ Cologuard? Yes - Care Gap present. Rooming MA/LPN to request most recent results      If the patient is female:    4. For patients aged 41-77: Has the patient had a mammogram within the past 2 years? Yes - Care Gap present. Rooming MA/LPN to request most recent results      5. For patients aged 21-65: Has the patient had a pap smear?  Yes - no Care Gap present

## 2022-07-15 DIAGNOSIS — R12 HEART BURN: ICD-10-CM

## 2022-07-15 DIAGNOSIS — I10 ESSENTIAL HYPERTENSION: ICD-10-CM

## 2022-07-15 RX ORDER — OMEPRAZOLE 40 MG/1
CAPSULE, DELAYED RELEASE ORAL
Qty: 90 CAPSULE | Refills: 0 | Status: SHIPPED | OUTPATIENT
Start: 2022-07-15 | End: 2022-10-13

## 2022-07-15 RX ORDER — BISOPROLOL FUMARATE AND HYDROCHLOROTHIAZIDE 5; 6.25 MG/1; MG/1
TABLET ORAL
Qty: 90 TABLET | Refills: 0 | Status: SHIPPED | OUTPATIENT
Start: 2022-07-15 | End: 2022-10-13

## 2022-07-23 LAB
HEMOCCULT STL QL IA: NEGATIVE
SPECIMEN STATUS REPORT, ROLRST: NORMAL

## 2022-07-26 ENCOUNTER — TELEPHONE (OUTPATIENT)
Dept: PRIMARY CARE CLINIC | Age: 57
End: 2022-07-26

## 2022-07-26 DIAGNOSIS — E03.9 ACQUIRED HYPOTHYROIDISM: Primary | ICD-10-CM

## 2022-07-26 RX ORDER — LEVOTHYROXINE SODIUM 75 UG/1
75 TABLET ORAL
Qty: 90 TABLET | Refills: 0 | Status: SHIPPED | OUTPATIENT
Start: 2022-07-26 | End: 2022-11-01

## 2022-07-26 NOTE — TELEPHONE ENCOUNTER
Called the patient back, patient has made an appointment with Dr Malaika Fitzpatrick the earliest they had was Feb 2023. Asking how important it is to be seen for the liver.  Patient also asking about Tyroid result seeing if needing to change dosage for Levothyroxine

## 2022-07-26 NOTE — TELEPHONE ENCOUNTER
Patient called in regards to test results for Hep C stating the numbers were high.   Please give patient a call back

## 2022-07-27 NOTE — TELEPHONE ENCOUNTER
Called and had to leave a VM- notified the patient hat it was okay to wait for the appointment with Dr Dusty Hernandez, and that a new dose was sent for the thyroid medication

## 2022-08-14 DIAGNOSIS — K59.1 FUNCTIONAL DIARRHEA: ICD-10-CM

## 2022-08-15 RX ORDER — MONTELUKAST SODIUM 4 MG/1
TABLET, CHEWABLE ORAL
Qty: 60 TABLET | Refills: 0 | Status: SHIPPED | OUTPATIENT
Start: 2022-08-15 | End: 2022-09-13

## 2022-09-13 DIAGNOSIS — K59.1 FUNCTIONAL DIARRHEA: ICD-10-CM

## 2022-09-13 RX ORDER — MONTELUKAST SODIUM 4 MG/1
TABLET, CHEWABLE ORAL
Qty: 60 TABLET | Refills: 0 | Status: SHIPPED | OUTPATIENT
Start: 2022-09-13 | End: 2022-10-14

## 2022-10-04 ENCOUNTER — OFFICE VISIT (OUTPATIENT)
Dept: ORTHOPEDIC SURGERY | Age: 57
End: 2022-10-04
Payer: COMMERCIAL

## 2022-10-04 VITALS — HEIGHT: 67 IN | BODY MASS INDEX: 38.37 KG/M2

## 2022-10-04 DIAGNOSIS — M25.561 RIGHT MEDIAL KNEE PAIN: ICD-10-CM

## 2022-10-04 DIAGNOSIS — S83.241A TEAR OF MEDIAL MENISCUS OF RIGHT KNEE, UNSPECIFIED TEAR TYPE, UNSPECIFIED WHETHER OLD OR CURRENT TEAR, INITIAL ENCOUNTER: Primary | ICD-10-CM

## 2022-10-04 DIAGNOSIS — M25.561 KNEE MENISCUS PAIN, RIGHT: ICD-10-CM

## 2022-10-04 DIAGNOSIS — S83.241A ACUTE MEDIAL MENISCUS TEAR, RIGHT, INITIAL ENCOUNTER: ICD-10-CM

## 2022-10-04 DIAGNOSIS — M25.561 PAIN OF RIGHT KNEE AFTER INJURY: Primary | ICD-10-CM

## 2022-10-04 DIAGNOSIS — M25.461 KNEE EFFUSION, RIGHT: ICD-10-CM

## 2022-10-04 PROCEDURE — 99214 OFFICE O/P EST MOD 30 MIN: CPT | Performed by: ORTHOPAEDIC SURGERY

## 2022-10-04 PROCEDURE — 20610 DRAIN/INJ JOINT/BURSA W/O US: CPT | Performed by: ORTHOPAEDIC SURGERY

## 2022-10-04 RX ORDER — BUPIVACAINE HYDROCHLORIDE 7.5 MG/ML
2 INJECTION, SOLUTION EPIDURAL; RETROBULBAR ONCE
Status: COMPLETED | OUTPATIENT
Start: 2022-10-04 | End: 2022-10-04

## 2022-10-04 RX ORDER — TRIAMCINOLONE ACETONIDE 40 MG/ML
80 INJECTION, SUSPENSION INTRA-ARTICULAR; INTRAMUSCULAR ONCE
Status: COMPLETED | OUTPATIENT
Start: 2022-10-04 | End: 2022-10-04

## 2022-10-04 RX ORDER — TRAMADOL HYDROCHLORIDE 50 MG/1
50 TABLET ORAL
Qty: 20 TABLET | Refills: 0 | Status: SHIPPED | OUTPATIENT
Start: 2022-10-04 | End: 2022-10-07

## 2022-10-04 RX ORDER — MELOXICAM 15 MG/1
15 TABLET ORAL DAILY
Qty: 30 TABLET | Refills: 1 | Status: SHIPPED | OUTPATIENT
Start: 2022-10-04

## 2022-10-04 RX ADMIN — BUPIVACAINE HYDROCHLORIDE 15 MG: 7.5 INJECTION, SOLUTION EPIDURAL; RETROBULBAR at 13:37

## 2022-10-04 RX ADMIN — TRIAMCINOLONE ACETONIDE 80 MG: 40 INJECTION, SUSPENSION INTRA-ARTICULAR; INTRAMUSCULAR at 13:38

## 2022-10-04 NOTE — PROGRESS NOTES
Trudy Block (: 1965) is a 56 y.o. female, patient, here for evaluation of the following chief complaint(s):  Knee Pain (right)       HPI:    Began having increased right knee pain approximately 3 weeks ago. The patient states that she fell because her left knee gave out. The patient describes her right knee pain as extremely severe, sharp, stabbing, throbbing, aching, burning, and constant. She has been experiencing some swelling in her right knee. The patient states that right knee pain continues to get worse. She reports that standing, walking, lifting, exercise, twisting, lying in bed, bending, squatting, kneeling, stairs, sitting, and coughing make her pain worse and elevation makes pain better. She has been taking diclofenac for discomfort as needed. The patient has been using a knee brace for comfort, stability, and support. She is also been using a cane for assistance. The patient was not seen in the emergency room for right knee pain. She does report 2 previous left knee surgeries. The patient reports no previous or related right knee surgery. Allergies   Allergen Reactions    Tylenol-Codeine #3 [Acetaminophen-Codeine] Unknown (comments)     Takes hydrocodone at home. Has used oxycodone at well       Current Outpatient Medications   Medication Sig    meloxicam (Mobic) 15 mg tablet Take 1 Tablet by mouth daily. colestipoL (COLESTID) 1 gram tablet TAKE 1 TABLET BY MOUTH TWICE DAILY    levothyroxine (SYNTHROID) 75 mcg tablet Take 1 Tablet by mouth Daily (before breakfast) for 90 days. bisoprolol-hydroCHLOROthiazide (ZIAC) 5-6.25 mg per tablet TAKE 1 TABLET BY MOUTH DAILY    omeprazole (PRILOSEC) 40 mg capsule TAKE 1 CAPSULE BY MOUTH EVERY DAY    escitalopram oxalate (LEXAPRO) 20 mg tablet     ARIPiprazole (ABILIFY) 5 mg tablet Take 5 mg by mouth daily. dextroamphetamine-amphetamine (ADDERALL) 30 mg tablet Take 30 mg by mouth two (2) times a day.  ONLY TAKES WHEN WORKING No current facility-administered medications for this visit.        Past Medical History:   Diagnosis Date    Arthritis     Colitis     Colitis, ulcerative ([facility] [address]     Family history of skin cancer     Functional diarrhea 2021    GERD (gastroesophageal reflux disease)     Ill-defined condition     ADHD    Ill-defined condition     Hep C    Liver disease     HEPATITIS C- NEVER TREATED    Nausea & vomiting     Psychiatric disorder     ADD    Skin cancer     BCC    Symptomatic cholelithiasis 2020    Thyroid disease     Ulcerative colitis ([facility] [address]         Past Surgical History:   Procedure Laterality Date    HX CHOLECYSTECTOMY  2020    HX KNEE ARTHROSCOPY      x3- last done 2015 to harvest cartilage    HX ORTHOPAEDIC Left 2016    articular cartilage transplant    HX TONSILLECTOMY  1988       Family History   Problem Relation Age of Onset    Cancer Mother         melanoma    Hypertension Father     Cancer Father         melanoma    OSTEOARTHRITIS Father     No Known Problems Sister     No Known Problems Son     No Known Problems Daughter     No Known Problems Daughter     Anesth Problems Neg Hx         Social History     Socioeconomic History    Marital status:      Spouse name: Not on file    Number of children: Not on file    Years of education: Not on file    Highest education level: Not on file   Occupational History    Not on file   Tobacco Use    Smoking status: Former     Types: Cigarettes     Quit date:      Years since quittin.7    Smokeless tobacco: Former    Tobacco comments:     SMOKES 1 CIG PER MONTH   Substance and Sexual Activity    Alcohol use: No    Drug use: No    Sexual activity: Never   Other Topics Concern    Not on file   Social History Narrative    Not on file     Social Determinants of Health     Financial Resource Strain: Not on file   Food Insecurity: Not on file   Transportation Needs: Not on file   Physical Activity: Not on file   Stress: Not on file Social Connections: Not on file   Intimate Partner Violence: Not on file   Housing Stability: Not on file       Review of Systems   All other systems reviewed and are negative. Vitals:  Ht 5' 7\" (1.702 m)   LMP 04/13/2015   BMI 38.37 kg/m²    Body mass index is 38.37 kg/m². Ortho Exam     The patient is well-developed and well-nourished. The patient presents today in alert and oriented x3 with a normal mood and affect. The patient stands with a normal weightbearing line but walks with a slightly antalgic gait because of her right knee pain. Right knee, the patient is tender to palpation along the medial joint line, and has a mild size effusion. The patient has discomfort with Marcia´s maneuvers, and the knee is stable. They lack full flexion secondary to the effusion, but have full extension. They have 5/5 strength, and are neurovascularly intact distally. There is no erythema, warmth or skin lesions present. ASSESSMENT/PLAN:      1. Pain of right knee after injury  -     XR KNEE RT MIN 4 V; Future  -     meloxicam (Mobic) 15 mg tablet; Take 1 Tablet by mouth daily. , Normal, Disp-30 Tablet, R-1  2. Right medial knee pain  3. Knee meniscus pain, right  4. Acute medial meniscus tear, right, initial encounter  -     MRI KNEE RT WO CONT; Future  -     meloxicam (Mobic) 15 mg tablet; Take 1 Tablet by mouth daily. , Normal, Disp-30 Tablet, R-1  5. Knee effusion, right  -     bupivacaine (PF) (MARCAINE) 0.75 % (7.5 mg/mL) injection 15 mg; 15 mg (2 mL), Intra artICUlar, ONCE, 1 dose, On Tue 10/4/22 at 1400  -     triamcinolone acetonide (KENALOG-40) 40 mg/mL injection 80 mg; 80 mg, Intra artICUlar, ONCE, 1 dose, On Tue 10/4/22 at 1400     XR Results (most recent):  Results from Appointment encounter on 10/04/22    XR KNEE RT MIN 4 V    Narrative  Right knee 4 view x-ray showed no evidence of a fracture or dislocation. Evidence of mild to moderate patellofemoral joint space narrowing.   Medial and lateral joint spaces are well-maintained. Evidence of previous surgical intervention with metallic hardware in her left tibia. Below is the assessment and plan developed based on review of pertinent history, physical exam, labs, studies, and medications. We discussed the patient's right knee pain and her signs, symptoms, physical exam, description of her pain, description of her injury, and x-rays are consistent with an acute medial meniscus tear and small to mild size effusion. The possible treatment options were discussed with the patient and because of an effusion present we elected to aspirate and inject her right knee with cortisone today to try and alleviate some of her discomfort. The risks and benefits of the aspiration and injection were discussed in detail with the patient and under sterile prep the patient's right knee was aspirated approximately 10 ccs of straw-colored synovial fluid and injected with 2 ccs of 40 mg/cc of Kenalog and 2 ccs of 0.75% Sensorcaine. She tolerated both the aspiration and injection well. Also because of the duration of her increased pain, no improvement with multiple modalities of conservative management including an at-home exercise program, her physical exam, description of her pain, description of her injury, x-rays, and her inability to complete daily living activities without significant discomfort we elected to obtain an MRI of her right knee to further evaluate the severity of her acute medial meniscus tear and causation for her effusion. The MRI images and results will be used in preoperative planning if and almost certainly when surgical intervention is necessary. The risks and benefits of the MRI were discussed in detail with the patient and she would like to proceed. We will schedule at her convenience. I will see her back after MRI is complete to discuss the images, results, and further treatment options.   In the interim, I did encourage her to ice and elevate when possible, modify her activity level based on her right knee pain, monitor her effusion, and use anti-inflammatory medication when necessary. The patient was given a prescription for both meloxicam and Ultram which she will use as needed and as directed. She will work on range of motion, strengthening, and stretching exercises with an at-home exercise program as pain tolerates. She is to avoid any deep knee bend activities against resistance, squatting, kneeling, stairs, lunging, cutting, twisting, change direction, and high impact loading activities. I will see her back as noted above after right knee MRI is complete. **We will obtain an MRI for more information to determine the best treatment plan moving forward and help us prepare for surgical intervention if necessary. **    Return for After her right knee MRI is complete. An electronic signature was used to authenticate this note.   -- Fiona Jackson MD

## 2022-10-13 DIAGNOSIS — K59.1 FUNCTIONAL DIARRHEA: ICD-10-CM

## 2022-10-13 DIAGNOSIS — I10 ESSENTIAL HYPERTENSION: ICD-10-CM

## 2022-10-13 DIAGNOSIS — R12 HEART BURN: ICD-10-CM

## 2022-10-13 RX ORDER — OMEPRAZOLE 40 MG/1
CAPSULE, DELAYED RELEASE ORAL
Qty: 90 CAPSULE | Refills: 0 | Status: SHIPPED | OUTPATIENT
Start: 2022-10-13

## 2022-10-13 RX ORDER — BISOPROLOL FUMARATE AND HYDROCHLOROTHIAZIDE 5; 6.25 MG/1; MG/1
TABLET ORAL
Qty: 90 TABLET | Refills: 0 | Status: SHIPPED | OUTPATIENT
Start: 2022-10-13

## 2022-10-14 ENCOUNTER — HOSPITAL ENCOUNTER (OUTPATIENT)
Dept: MRI IMAGING | Age: 57
Discharge: HOME | End: 2022-10-14
Attending: ORTHOPAEDIC SURGERY
Payer: COMMERCIAL

## 2022-10-14 DIAGNOSIS — S83.241A ACUTE MEDIAL MENISCUS TEAR, RIGHT, INITIAL ENCOUNTER: ICD-10-CM

## 2022-10-14 PROCEDURE — 73721 MRI JNT OF LWR EXTRE W/O DYE: CPT

## 2022-10-14 RX ORDER — MONTELUKAST SODIUM 4 MG/1
TABLET, CHEWABLE ORAL
Qty: 60 TABLET | Refills: 0 | Status: SHIPPED | OUTPATIENT
Start: 2022-10-14

## 2022-10-18 ENCOUNTER — OFFICE VISIT (OUTPATIENT)
Dept: ORTHOPEDIC SURGERY | Age: 57
End: 2022-10-18
Payer: COMMERCIAL

## 2022-10-18 VITALS — HEIGHT: 67 IN | WEIGHT: 230 LBS | BODY MASS INDEX: 36.1 KG/M2

## 2022-10-18 DIAGNOSIS — M25.561 PAIN OF RIGHT KNEE AFTER INJURY: Primary | ICD-10-CM

## 2022-10-18 DIAGNOSIS — M25.561 KNEE MENISCUS PAIN, RIGHT: ICD-10-CM

## 2022-10-18 DIAGNOSIS — S83.231D COMPLEX TEAR OF MEDIAL MENISCUS OF RIGHT KNEE, SUBSEQUENT ENCOUNTER: ICD-10-CM

## 2022-10-18 DIAGNOSIS — S83.241A TEAR OF MEDIAL MENISCUS OF RIGHT KNEE, UNSPECIFIED TEAR TYPE, UNSPECIFIED WHETHER OLD OR CURRENT TEAR, INITIAL ENCOUNTER: Primary | ICD-10-CM

## 2022-10-18 DIAGNOSIS — M25.561 RIGHT MEDIAL KNEE PAIN: ICD-10-CM

## 2022-10-18 DIAGNOSIS — M23.41 LOOSE BODY IN KNEE, RIGHT KNEE: ICD-10-CM

## 2022-10-18 DIAGNOSIS — M94.261 CHONDROMALACIA, RIGHT KNEE: ICD-10-CM

## 2022-10-18 PROCEDURE — 99214 OFFICE O/P EST MOD 30 MIN: CPT | Performed by: ORTHOPAEDIC SURGERY

## 2022-10-18 RX ORDER — TRAMADOL HYDROCHLORIDE 50 MG/1
50 TABLET ORAL
Qty: 20 TABLET | Refills: 0 | Status: SHIPPED | OUTPATIENT
Start: 2022-10-18 | End: 2022-11-01

## 2022-10-18 NOTE — PROGRESS NOTES
Gisela Downs (: 1965) is a 56 y.o. female, patient, here for evaluation of the following chief complaint(s):  Knee Pain (Right, mri results)       HPI:    She was last seen for right knee pain on 10/4/2022. Since then, the patient did have an MRI performed on her right knee on 10/14/2022. The patient states that her pain level is worse than it was at her last visit. She describes her right knee pain as sharp, stabbing, throbbing, aching, and constant. Her right knee pain does make it very difficult for her to go to sleep and does frequently wake her up from sleep. She has been experiencing some swelling, bruising, and weakness in her right knee. The patient has been taking anti-inflammatory medication for her discomfort as needed. Right knee MRI images and results were independently reviewed and they were consistent with extensive cartilage loss in the medial and patellofemoral compartments with multiple loose bodies. Complex tear of the posterior horn the medial meniscus with prominent radial component. Body is extruded from the joint. Allergies   Allergen Reactions    Tylenol-Codeine #3 [Acetaminophen-Codeine] Unknown (comments)     Takes hydrocodone at home. Has used oxycodone at well       Current Outpatient Medications   Medication Sig    traMADoL (ULTRAM) 50 mg tablet Take 1 Tablet by mouth every six (6) hours as needed for Pain for up to 8 days. Max Daily Amount: 200 mg.    colestipoL (COLESTID) 1 gram tablet TAKE 1 TABLET BY MOUTH TWICE DAILY    bisoprolol-hydroCHLOROthiazide (ZIAC) 5-6.25 mg per tablet TAKE 1 TABLET BY MOUTH DAILY    omeprazole (PRILOSEC) 40 mg capsule TAKE 1 CAPSULE BY MOUTH EVERY DAY    meloxicam (Mobic) 15 mg tablet Take 1 Tablet by mouth daily. levothyroxine (SYNTHROID) 75 mcg tablet Take 1 Tablet by mouth Daily (before breakfast) for 90 days. escitalopram oxalate (LEXAPRO) 20 mg tablet     ARIPiprazole (ABILIFY) 5 mg tablet Take 5 mg by mouth daily. dextroamphetamine-amphetamine (ADDERALL) 30 mg tablet Take 30 mg by mouth two (2) times a day. ONLY TAKES WHEN WORKING     No current facility-administered medications for this visit.        Past Medical History:   Diagnosis Date    Arthritis     Colitis     Colitis, ulcerative ([facility] [address]     Family history of skin cancer     Functional diarrhea 2021    GERD (gastroesophageal reflux disease)     Ill-defined condition     ADHD    Ill-defined condition     Hep C    Liver disease     HEPATITIS C- NEVER TREATED    Nausea & vomiting     Psychiatric disorder     ADD    Skin cancer     BCC    Symptomatic cholelithiasis 2020    Thyroid disease     Ulcerative colitis ([facility] [address]         Past Surgical History:   Procedure Laterality Date    HX CHOLECYSTECTOMY  2020    HX KNEE ARTHROSCOPY      x3- last done 2015 to harvest cartilage    HX ORTHOPAEDIC Left 2016    articular cartilage transplant    HX TONSILLECTOMY  1988       Family History   Problem Relation Age of Onset    Cancer Mother         melanoma    Hypertension Father     Cancer Father         melanoma    OSTEOARTHRITIS Father     No Known Problems Sister     No Known Problems Son     No Known Problems Daughter     No Known Problems Daughter     Anesth Problems Neg Hx         Social History     Socioeconomic History    Marital status:      Spouse name: Not on file    Number of children: Not on file    Years of education: Not on file    Highest education level: Not on file   Occupational History    Not on file   Tobacco Use    Smoking status: Former     Types: Cigarettes     Quit date:      Years since quittin.8    Smokeless tobacco: Former    Tobacco comments:     SMOKES 1 CIG PER MONTH   Substance and Sexual Activity    Alcohol use: No    Drug use: No    Sexual activity: Never   Other Topics Concern    Not on file   Social History Narrative    Not on file     Social Determinants of Health     Financial Resource Strain: Not on file Food Insecurity: Not on file   Transportation Needs: Not on file   Physical Activity: Not on file   Stress: Not on file   Social Connections: Not on file   Intimate Partner Violence: Not on file   Housing Stability: Not on file       Review of Systems   All other systems reviewed and are negative. Vitals:  Ht 5' 7\" (1.702 m)   Wt 230 lb (104.3 kg)   LMP 04/13/2015   BMI 36.02 kg/m²    Body mass index is 36.02 kg/m². Ortho Exam     The patient is well-developed and well-nourished. The patient presents today in alert and oriented x3 with a normal mood and affect. The patient stands with a normal weightbearing line but walks with a slightly antalgic gait because of her right knee pain. Right knee, the patient is tender to palpation along the medial joint line, and has an effusion. She does have pain with patellar compression and quadriceps contraction with crepitus. The patient has discomfort with Marcia´s maneuvers, and the knee is stable. They lack full flexion secondary to the effusion, but have full extension. They have 5/5 strength, and are neurovascularly intact distally. There is no erythema, warmth or skin lesions present. ASSESSMENT/PLAN:      1. Pain of right knee after injury  2. Right medial knee pain  3. Knee meniscus pain, right  4. Complex tear of medial meniscus of right knee, subsequent encounter  -     REFERRAL TO ORTHOPEDIC SURGERY  5. Loose body in knee, right knee  6. Chondromalacia, right knee     Below is the assessment and plan developed based on review of pertinent history, physical exam, labs, studies, and medications. We discussed the patient's ongoing and worsening right knee pain and we independently reviewed her MRI images and results and they were consistent with extensive cartilage loss in the medial and patellofemoral compartments with multiple loose bodies. Complex tear of the posterior horn the medial meniscus with prominent radial component.   Body is extruded from the joint. The possible treatment with the patient and because of the duration of her increased pain, no improvement with multiple modalities of conservative management including an at-home exercise program, her physical exam, description of her pain, past x-rays, independently reviewed MRI images and results, and her inability to complete daily living activities without significant discomfort we both decided that surgical intervention was the best treatment plan. The risks and benefits of a right knee arthroscopic exam with partial medial meniscectomy were discussed in detail with the patient and she would like to proceed. We will schedule this at her convenience. In the interim, I did encourage her to ice and elevate when possible, modify her activity level based on her right knee pain, and use anti-inflammatory medication when necessary. The patient will also work on range of motion, strengthening, and stretching exercises with an at-home exercise program as pain tolerates. She is to avoid any deep knee bend activities against resistance, squatting, kneeling, stairs, lunging, cutting, twisting, change of direction, and high impact loading activities. I will see her back in the office on an as-needed basis on the day of her upcoming right knee surgery. Return for In the office as needed or on the day of her upcoming right knee surgery. An electronic signature was used to authenticate this note.   -- Betzaida Eubanks MD

## 2022-10-24 ENCOUNTER — DOCUMENTATION ONLY (OUTPATIENT)
Dept: ORTHOPEDIC SURGERY | Age: 57
End: 2022-10-24

## 2022-10-24 DIAGNOSIS — S83.241A TEAR OF MEDIAL MENISCUS OF RIGHT KNEE, UNSPECIFIED TEAR TYPE, UNSPECIFIED WHETHER OLD OR CURRENT TEAR, INITIAL ENCOUNTER: Primary | ICD-10-CM

## 2022-10-25 RX ORDER — OXYCODONE AND ACETAMINOPHEN 5; 325 MG/1; MG/1
1 TABLET ORAL
Qty: 30 TABLET | Refills: 0 | Status: SHIPPED | OUTPATIENT
Start: 2022-10-25 | End: 2022-11-01

## 2022-11-01 DIAGNOSIS — E03.9 ACQUIRED HYPOTHYROIDISM: ICD-10-CM

## 2022-11-01 DIAGNOSIS — S83.241A TEAR OF MEDIAL MENISCUS OF RIGHT KNEE, UNSPECIFIED TEAR TYPE, UNSPECIFIED WHETHER OLD OR CURRENT TEAR, INITIAL ENCOUNTER: ICD-10-CM

## 2022-11-01 RX ORDER — LEVOTHYROXINE SODIUM 75 UG/1
TABLET ORAL
Qty: 90 TABLET | Refills: 0 | Status: SHIPPED | OUTPATIENT
Start: 2022-11-01

## 2022-11-01 RX ORDER — TRAMADOL HYDROCHLORIDE 50 MG/1
50 TABLET ORAL
Qty: 20 TABLET | Refills: 0 | Status: SHIPPED | OUTPATIENT
Start: 2022-11-01 | End: 2022-11-09

## 2022-11-03 ENCOUNTER — OFFICE VISIT (OUTPATIENT)
Dept: ORTHOPEDIC SURGERY | Age: 57
End: 2022-11-03
Payer: COMMERCIAL

## 2022-11-03 VITALS — HEIGHT: 67 IN | BODY MASS INDEX: 36.1 KG/M2 | WEIGHT: 230 LBS

## 2022-11-03 DIAGNOSIS — M25.561 POSTOPERATIVE PAIN OF RIGHT KNEE: Primary | ICD-10-CM

## 2022-11-03 DIAGNOSIS — Z98.890 STATUS POST ARTHROSCOPIC SURGERY OF RIGHT KNEE: ICD-10-CM

## 2022-11-03 DIAGNOSIS — G89.18 POSTOPERATIVE PAIN OF RIGHT KNEE: Primary | ICD-10-CM

## 2022-11-03 PROCEDURE — 99024 POSTOP FOLLOW-UP VISIT: CPT | Performed by: ORTHOPAEDIC SURGERY

## 2022-11-03 RX ORDER — HYDROCODONE BITARTRATE AND ACETAMINOPHEN 10; 325 MG/1; MG/1
1 TABLET ORAL
Qty: 30 TABLET | Refills: 0 | Status: SHIPPED | OUTPATIENT
Start: 2022-11-03 | End: 2022-12-03

## 2022-11-03 RX ORDER — GABAPENTIN 300 MG/1
300 CAPSULE ORAL 3 TIMES DAILY
Qty: 30 CAPSULE | Refills: 0 | Status: SHIPPED | OUTPATIENT
Start: 2022-11-03

## 2022-11-03 NOTE — PROGRESS NOTES
Gisela Downs (: 1965) is a 56 y.o. female, patient, here for evaluation of the following chief complaint(s):  Knee Pain (Right ) and Surgical Follow-up (Sx on 10/26/22)       HPI:    She is now approximately 1 week status post right knee arthroscopic exam with partial medial meniscectomy and abrasion arthroplasty of the patella. Her surgery was performed on 10/26/2022. The patient states that her pain level has improved since her surgical procedure. She rates the severity of her postoperative right knee pain as a 6 out of 10. She describes her pain as throbbing, aching, burning, and intermittent. Her postoperative right knee pain does make it difficult for her to go to sleep and does wake her up from sleep. She has been taking anti-inflammatory and narcotic pain medication for her discomfort as needed. The patient has been working on range of motion, strengthening, and stretching exercises with an at-home exercise program.      Allergies   Allergen Reactions    Tylenol-Codeine #3 [Acetaminophen-Codeine] Unknown (comments)     Takes hydrocodone at home. Has used oxycodone at well       Current Outpatient Medications   Medication Sig    levothyroxine (SYNTHROID) 75 mcg tablet TAKE 1 TABLET BY MOUTH DAILY BEFORE BREAKFAST    traMADoL (ULTRAM) 50 mg tablet Take 1 Tablet by mouth every six (6) hours as needed for Pain for up to 8 days. Max Daily Amount: 200 mg.    colestipoL (COLESTID) 1 gram tablet TAKE 1 TABLET BY MOUTH TWICE DAILY    bisoprolol-hydroCHLOROthiazide (ZIAC) 5-6.25 mg per tablet TAKE 1 TABLET BY MOUTH DAILY    omeprazole (PRILOSEC) 40 mg capsule TAKE 1 CAPSULE BY MOUTH EVERY DAY    meloxicam (Mobic) 15 mg tablet Take 1 Tablet by mouth daily. escitalopram oxalate (LEXAPRO) 20 mg tablet     ARIPiprazole (ABILIFY) 5 mg tablet Take 5 mg by mouth daily. dextroamphetamine-amphetamine (ADDERALL) 30 mg tablet Take 30 mg by mouth two (2) times a day.  ONLY TAKES WHEN WORKING     No current facility-administered medications for this visit.        Past Medical History:   Diagnosis Date    Arthritis     Colitis     Colitis, ulcerative ([facility] [address]     Family history of skin cancer     Functional diarrhea 2021    GERD (gastroesophageal reflux disease)     Ill-defined condition     ADHD    Ill-defined condition     Hep C    Liver disease     HEPATITIS C- NEVER TREATED    Nausea & vomiting     Psychiatric disorder     ADD    Skin cancer     BCC    Symptomatic cholelithiasis 2020    Thyroid disease     Ulcerative colitis ([facility] [address]         Past Surgical History:   Procedure Laterality Date    HX CHOLECYSTECTOMY  2020    HX KNEE ARTHROSCOPY      x3- last done 2015 to harvest cartilage    HX ORTHOPAEDIC Left 2016    articular cartilage transplant    HX TONSILLECTOMY  1988       Family History   Problem Relation Age of Onset    Cancer Mother         melanoma    Hypertension Father     Cancer Father         melanoma    OSTEOARTHRITIS Father     No Known Problems Sister     No Known Problems Son     No Known Problems Daughter     No Known Problems Daughter     Anesth Problems Neg Hx         Social History     Socioeconomic History    Marital status:      Spouse name: Not on file    Number of children: Not on file    Years of education: Not on file    Highest education level: Not on file   Occupational History    Not on file   Tobacco Use    Smoking status: Former     Types: Cigarettes     Quit date:      Years since quittin.8    Smokeless tobacco: Former    Tobacco comments:     SMOKES 1 CIG PER MONTH   Substance and Sexual Activity    Alcohol use: No    Drug use: No    Sexual activity: Never   Other Topics Concern    Not on file   Social History Narrative    Not on file     Social Determinants of Health     Financial Resource Strain: Not on file   Food Insecurity: Not on file   Transportation Needs: Not on file   Physical Activity: Not on file   Stress: Not on file   Social Connections: Not on file   Intimate Partner Violence: Not on file   Housing Stability: Not on file       Review of Systems   All other systems reviewed and are negative. Vitals:  Ht 5' 7\" (1.702 m)   Wt 230 lb (104.3 kg)   LMP 04/13/2015   BMI 36.02 kg/m²    Body mass index is 36.02 kg/m². Ortho Exam     The patient is well-developed and well-nourished. The patient presents today in alert and oriented x3 with a normal mood and affect. The patient stands with a normal weightbearing line but walks with a slightly antalgic gait because of her postoperative right knee pain. Right knee wounds are clean and dry neurovascular intact. There is some ecchymosis but no erythema. Her stitches were removed and her incisions are healing nicely with no sign of irritation or infection and look normal.  She does have full extension. There is a small postoperative effusion present. Her range of motion has improved since her surgical procedure. She has approximately 100 degrees of seated nonweightbearing flexion. There is limitation secondary to her postoperative discomfort and small effusion which is normal and to be expected. Her quadricep strength is improving. She can do a seated straight leg raise without discomfort but there is quadriceps weakness noted compared to normal.  She reports no calf tenderness. Her sensations are intact and pulses are 2+. Her skin is healing nicely. ASSESSMENT/PLAN:      1. Postoperative pain of right knee  2. Status post arthroscopic surgery of right knee     Below is the assessment and plan developed based on review of pertinent history, physical exam, labs, studies, and medications. We discussed the patient's recent right knee arthroscopic exam with partial medial meniscectomy and abrasion arthroplasty of the patella. Her surgery was performed on 10/26/2022. She is now approximately 1 week status postsurgical intervention.   The patient is progressing nicely in the early stages of her recovery and having the appropriate amount of expected postoperative discomfort at this time. We did remove the patient stitches today in the office without complication. Her incisions are healing nicely with no sign of irritation or infection and look normal.  She does have full extension. There is a small postoperative effusion present which we will continue to monitor. Her seated nonweightbearing range of motion and strength are both improving nicely. The patient will continue the postoperative protocol by working on range of motion, strengthening, and stretching exercises with an at-home exercise program as pain tolerates. She is to avoid any deep knee bend activities against resistance, squatting, kneeling, stairs, lunging, and high impact loading activities. The patient may continue to slowly increase activities as tolerated and as discussed today in the office. The patient was given a prescription for both hydrocodone and gabapentin. She will use these as needed and as directed. I will see her back in 3 weeks for reevaluation and further discussion of the postoperative protocol. Return in about 3 weeks (around 11/24/2022) for Reevaluation and further discussion of the postoperative protocol. An electronic signature was used to authenticate this note.   -- Deysi Garza MD

## 2022-11-11 ENCOUNTER — OFFICE VISIT (OUTPATIENT)
Dept: ORTHOPEDIC SURGERY | Age: 57
End: 2022-11-11
Payer: COMMERCIAL

## 2022-11-11 VITALS — HEIGHT: 67 IN | BODY MASS INDEX: 36.02 KG/M2

## 2022-11-11 DIAGNOSIS — M25.561 POSTOPERATIVE PAIN OF RIGHT KNEE: Primary | ICD-10-CM

## 2022-11-11 DIAGNOSIS — G89.18 POSTOPERATIVE PAIN OF RIGHT KNEE: Primary | ICD-10-CM

## 2022-11-11 DIAGNOSIS — M94.261 CHONDROMALACIA, RIGHT KNEE: ICD-10-CM

## 2022-11-11 DIAGNOSIS — Z98.890 STATUS POST ARTHROSCOPIC SURGERY OF RIGHT KNEE: Primary | ICD-10-CM

## 2022-11-11 DIAGNOSIS — S83.241A TEAR OF MEDIAL MENISCUS OF RIGHT KNEE, UNSPECIFIED TEAR TYPE, UNSPECIFIED WHETHER OLD OR CURRENT TEAR, INITIAL ENCOUNTER: ICD-10-CM

## 2022-11-11 DIAGNOSIS — Z98.890 STATUS POST ARTHROSCOPIC SURGERY OF RIGHT KNEE: ICD-10-CM

## 2022-11-11 PROCEDURE — 99024 POSTOP FOLLOW-UP VISIT: CPT | Performed by: ORTHOPAEDIC SURGERY

## 2022-11-11 RX ORDER — HYDROCODONE BITARTRATE AND ACETAMINOPHEN 10; 325 MG/1; MG/1
1 TABLET ORAL
Qty: 30 TABLET | Refills: 0 | Status: SHIPPED | OUTPATIENT
Start: 2022-11-11 | End: 2022-11-21

## 2022-11-11 NOTE — PROGRESS NOTES
Temo Villafuerte (: 1965) is a 56 y.o. female, patient, here for evaluation of the following chief complaint(s):  Knee Pain (right) and Surgical Follow-up (Sx 10/26/22)       HPI:    She is now just over 2-week status post right knee arthroscopic exam with partial medial meniscectomy and abrasion arthroplasty of the patella. Her surgery was performed on 10/26/2022. She was last seen on 11/3/2022. The patient states that her pain level is worse than it was at her last visit. She rates the severity of her postoperative right knee pain as a 10 out of 10. She describes her pain as sharp, stabbing, throbbing, aching, burning, and constant. Her postoperative right knee pain does make it difficult for her to go to sleep and does wake her up from sleep. She has been experiencing some postoperative swelling. The patient has been taking meloxicam for her discomfort as needed. She has been working on range of motion, strengthening, and stretching exercises postoperatively. Allergies   Allergen Reactions    Tylenol-Codeine #3 [Acetaminophen-Codeine] Unknown (comments)     Takes hydrocodone at home. Has used oxycodone at well       Current Outpatient Medications   Medication Sig    HYDROcodone-acetaminophen (Norco)  mg tablet Take 1 Tablet by mouth every eight (8) hours as needed for Pain for up to 10 days. Max Daily Amount: 3 Tablets. HYDROcodone-acetaminophen (NORCO)  mg tablet Take 1 Tablet by mouth every six (6) hours as needed for Pain for up to 30 days. Max Daily Amount: 4 Tablets.    gabapentin (NEURONTIN) 300 mg capsule Take 1 Capsule by mouth three (3) times daily.  Max Daily Amount: 900 mg.    levothyroxine (SYNTHROID) 75 mcg tablet TAKE 1 TABLET BY MOUTH DAILY BEFORE BREAKFAST    colestipoL (COLESTID) 1 gram tablet TAKE 1 TABLET BY MOUTH TWICE DAILY    bisoprolol-hydroCHLOROthiazide (ZIAC) 5-6.25 mg per tablet TAKE 1 TABLET BY MOUTH DAILY    omeprazole (PRILOSEC) 40 mg capsule TAKE 1 CAPSULE BY MOUTH EVERY DAY    meloxicam (Mobic) 15 mg tablet Take 1 Tablet by mouth daily. escitalopram oxalate (LEXAPRO) 20 mg tablet     ARIPiprazole (ABILIFY) 5 mg tablet Take 5 mg by mouth daily. dextroamphetamine-amphetamine (ADDERALL) 30 mg tablet Take 30 mg by mouth two (2) times a day. ONLY TAKES WHEN WORKING     No current facility-administered medications for this visit.        Past Medical History:   Diagnosis Date    Arthritis     Colitis     Colitis, ulcerative ([facility] [address]     Family history of skin cancer     Functional diarrhea 2021    GERD (gastroesophageal reflux disease)     Ill-defined condition     ADHD    Ill-defined condition     Hep C    Liver disease     HEPATITIS C- NEVER TREATED    Nausea & vomiting     Psychiatric disorder     ADD    Skin cancer     BCC    Symptomatic cholelithiasis 2020    Thyroid disease     Ulcerative colitis ([facility] [address]         Past Surgical History:   Procedure Laterality Date    HX CHOLECYSTECTOMY  2020    HX KNEE ARTHROSCOPY      x3- last done 2015 to harvest cartilage    HX ORTHOPAEDIC Left 2016    articular cartilage transplant    HX TONSILLECTOMY  1988       Family History   Problem Relation Age of Onset    Cancer Mother         melanoma    Hypertension Father     Cancer Father         melanoma    OSTEOARTHRITIS Father     No Known Problems Sister     No Known Problems Son     No Known Problems Daughter     No Known Problems Daughter     Anesth Problems Neg Hx         Social History     Socioeconomic History    Marital status:      Spouse name: Not on file    Number of children: Not on file    Years of education: Not on file    Highest education level: Not on file   Occupational History    Not on file   Tobacco Use    Smoking status: Former     Types: Cigarettes     Quit date:      Years since quittin.8    Smokeless tobacco: Former    Tobacco comments:     SMOKES 1 CIG PER MONTH   Substance and Sexual Activity    Alcohol use: No    Drug use: No    Sexual activity: Never   Other Topics Concern    Not on file   Social History Narrative    Not on file     Social Determinants of Health     Financial Resource Strain: Not on file   Food Insecurity: Not on file   Transportation Needs: Not on file   Physical Activity: Not on file   Stress: Not on file   Social Connections: Not on file   Intimate Partner Violence: Not on file   Housing Stability: Not on file       Review of Systems   All other systems reviewed and are negative. Vitals:  Ht 5' 7\" (1.702 m)   LMP 04/13/2015   BMI 36.02 kg/m²    Body mass index is 36.02 kg/m². Ortho Exam     The patient is well-developed and well-nourished. The patient presents today in alert and oriented x3 with a normal mood and affect. The patient stands with a normal weightbearing line but walks with a slightly antalgic gait because of her postoperative right knee pain. She does have difficulty walking secondary to her postoperative right knee pain. Right knee wounds are clean and dry neurovascular intact. There is no ecchymosis or erythema. Her incisions are well-healed with no sign of irritation or infection and look normal.  She does have full extension. There is still a small to mild size postoperative effusion present. Her range of motion has improved since her last visit. She has approximately 90 degrees of seated nonweightbearing flexion. There is limitation in her range of motion secondary to her postoperative discomfort and effusion which is to be expected. She can do a seated straight leg raise without discomfort but there is quadriceps weakness noted compared to normal.  She reports no calf tenderness. Her sensations are intact and pulses are 2+. Her skin is well-healed. ASSESSMENT/PLAN:      1. Postoperative pain of right knee  2. Status post arthroscopic surgery of right knee  3.  Chondromalacia, right knee     Below is the assessment and plan developed based on review of pertinent history, physical exam, labs, studies, and medications. **The patient was referred to formal physical therapy. **    We discussed the patient's right knee arthroscopic exam with partial medial meniscectomy and abrasion arthroplasty of the patella. Her surgery was performed on 10/26/2022. She is now just over 2 weeks status postsurgical intervention. The patient continues to progress in her recovery but she is having much more postoperative discomfort than we had expected at this time. The patient's incisions are well-healed with no sign of irritation or infection and look normal.  She does have full extension. There is a mild size postoperative effusion present. Her seated nonweightbearing range of motion and strength have improved since her last visit. She is still having increased discomfort secondary to her patellar chondromalacia. The possible treatment options were discussed with the patient and we elected to treat her pain with rest, ice, elevation, activity modification, and anti-inflammatory medication. The patient was given a prescription for hydrocodone which she will use as needed and as directed. She will work on range of motion, strengthening, and stretching exercises with both formal physical therapy and with an at-home exercise program as pain tolerates. She may slowly increase activities as tolerated and as discussed today in the office. She is still to avoid any deep knee bend activities against resistance, squatting, kneeling, stairs, lunging, and high impact loading activities. I will see her back in 2 to 3 weeks for reevaluation and further discussion of the postoperative protocol. Return in about 2 weeks (around 11/25/2022) for Re-evaluation and further discussion of the postop protocol. An electronic signature was used to authenticate this note.   -- Alea Wells MD

## 2022-11-12 DIAGNOSIS — K59.1 FUNCTIONAL DIARRHEA: ICD-10-CM

## 2022-11-14 RX ORDER — MONTELUKAST SODIUM 4 MG/1
TABLET, CHEWABLE ORAL
Qty: 60 TABLET | Refills: 0 | Status: SHIPPED | OUTPATIENT
Start: 2022-11-14

## 2022-11-17 ENCOUNTER — OFFICE VISIT (OUTPATIENT)
Dept: ORTHOPEDIC SURGERY | Age: 57
End: 2022-11-17
Payer: COMMERCIAL

## 2022-11-17 DIAGNOSIS — Z98.890 STATUS POST ARTHROSCOPIC SURGERY OF RIGHT KNEE: ICD-10-CM

## 2022-11-17 DIAGNOSIS — M25.561 ACUTE PAIN OF RIGHT KNEE: Primary | ICD-10-CM

## 2022-11-17 PROCEDURE — 97110 THERAPEUTIC EXERCISES: CPT | Performed by: PHYSICAL THERAPIST

## 2022-11-17 PROCEDURE — 97161 PT EVAL LOW COMPLEX 20 MIN: CPT | Performed by: PHYSICAL THERAPIST

## 2022-11-17 NOTE — PROGRESS NOTES
Dinorah Medeiros (: 1965) is a 56 y.o. female patient here for evaluation of the following chief complaint(s):  Knee Pain       Patient Name: Dinorah Medeiros  Date:2022  : 1965  [x]  Patient  Verified  Payor: BLUE [employer]OSS / Plan: [address] [place] [address] / Product Type: PPO /    In time: 10:00  Out time: 10:50  Total Treatment Time (min): 50  Total Timed Codes (min): 40  1:1 Treatment Time (MC only): NA   Visit #:       SUBJECTIVE/OBJECTIVE:  HPI    The patient is a 56year old female presenting status post right knee arthroscopy on 10/26/22, The patient is referred by Prince Cross MD.   Overall she has been doing okay. Notes that the first couple weeks were rough. She has been icing her knee and doing some gentle exercises. Notes that stairs, squatting, and walking are difficult. She is not in an exercise routine but notes that she should lose weight for her health and for her knees. She works 10-hour days, sits for long periods of time. Physical Exam    Knee Objective: right knee arthroscopy    Range of motion: on right  Flexion-  122°  Extension-  0°    Range of motion: on left  Flexion- 130°  Extension-  0°    Strength: on right  Knee ext- 5/5  Knee flex- 5/5  Hip flex/ext- 5/5  Hip ER/IR- NT  Hip abd- NT    Strength: on left  Knee ext- 5/5  Knee flex- 5/5  Hip flex/ext- 5/5    Flexibility:   Hamstring- moderate deficit bilaterally  Hip flexors-  moderate deficit bilaterally  Quadriceps- moderate deficit bilaterally  Gastroc-soleus-  moderate deficit bilaterally    Soft tissue:  patient demonstrates mild swelling surrounding the right knee joint. Pain: Reported a visual analog scale:  0-5/10    Joint mobility assessment: patient is hypomobile into tibiofemoral joint flexion, extension, as well as patellar mobility medial, lateral, superior, and inferior glides    Squatting: Able, limited in depth.     Calf raises: Able, no problems    SLS: Hesitancy to single leg balance on the right leg. Able to balance on the left for 10 seconds. Gait: patient exhibits increased discomfort with ambulation, demonstrates limping with ambulation, favors the left side. Lower Extremity Functional Scale  20\80        Therapeutic exercise performed: Hamstring and calf stretch with strap, band hip flexion/kick outs, mini squats, bike. 20 minutes. Patient finished with ice to the right knee joint for 10 minutes. An electronic signature was used to authenticate this note. -- Moreno Mcelroy PT       ASSESSMENT/PLAN:  Below is the assessment and plan developed based on review of pertinent history, physical exam, labs, studies, and medications. 1. Acute pain of right knee  2. Status post arthroscopic surgery of right knee      Return in about 1 week (around 11/24/2022). Ruben Mcgee is a 56 y.o. F presenting status post right knee arthroscopy, surgical date 10/26/22. She currently has the following physical therapy impairments: increased pain, loss of quadriceps contraction, increased knee swelling, loss of knee extension and flexion range of motion, loss of quadriceps, hamstring, and hip strength, inability to go up and down stairs, squat, sit, sleep, and stand for prolonged periods of time; antalgic gait and asymmetrical gait pattern, and an inability to perform daily, recreational, and work related activity. She would benefit from skilled physical therapy services in order to address the above impairments to allow for full functional return and return to recreation. Reviewed a home program with the patient, focus will be given to addressing pain, swelling, and quadriceps weakness, improving knee joint range of motion on the right, and working towards squatting, sitting, and recreational activity as the healing process occurs.  The patient appeared to understand the plan of care and was in agreement on her home program.  She will attend physical therapy for 1-2 visits a week, for 1 month in order to address the above impairments. Goals to be met in 4-6 weeks-  1. Patient will decrease pain to 0-1/10 in order to tolerate daily activity including sitting, squatting, standing, and sleeping  2. Patient will be able to perform 10 double leg squats without compensation and without knee discomfort to prepare for daily activity  3. Patient will be able to demonstrate the ability to go up and down 1 flight of stairs without increased knee pain  4. Patient will be able to demonstrate a symmetrical gait pattern, with proper quad control, on the right  5. The patient will be independent with a HEP         An electronic signature was used to authenticate this note.   -- Betsy Mcintyre, PT

## 2022-11-28 DIAGNOSIS — M25.561 PAIN OF RIGHT KNEE AFTER INJURY: ICD-10-CM

## 2022-11-28 DIAGNOSIS — S83.241A ACUTE MEDIAL MENISCUS TEAR, RIGHT, INITIAL ENCOUNTER: ICD-10-CM

## 2022-11-28 RX ORDER — MELOXICAM 15 MG/1
TABLET ORAL
Qty: 30 TABLET | Refills: 1 | Status: SHIPPED | OUTPATIENT
Start: 2022-11-28

## 2023-01-06 ENCOUNTER — OFFICE VISIT (OUTPATIENT)
Dept: HEMATOLOGY | Age: 58
End: 2023-01-06
Payer: COMMERCIAL

## 2023-01-06 VITALS
DIASTOLIC BLOOD PRESSURE: 102 MMHG | TEMPERATURE: 97.9 F | HEIGHT: 67 IN | HEART RATE: 88 BPM | BODY MASS INDEX: 42.38 KG/M2 | WEIGHT: 270 LBS | SYSTOLIC BLOOD PRESSURE: 134 MMHG | OXYGEN SATURATION: 98 %

## 2023-01-06 DIAGNOSIS — K76.0 FATTY LIVER: Primary | ICD-10-CM

## 2023-01-06 DIAGNOSIS — B18.2 CHRONIC HEPATITIS C WITHOUT HEPATIC COMA (HCC): ICD-10-CM

## 2023-01-06 PROBLEM — G47.00 INSOMNIA: Status: ACTIVE | Noted: 2020-12-01

## 2023-01-06 PROBLEM — Z90.49 HISTORY OF CHOLECYSTECTOMY: Status: ACTIVE | Noted: 2023-01-06

## 2023-01-06 RX ORDER — ZOLPIDEM TARTRATE 10 MG/1
TABLET ORAL
COMMUNITY
Start: 2023-01-02

## 2023-01-06 RX ORDER — GLECAPREVIR AND PIBRENTASVIR 40; 100 MG/1; MG/1
3 TABLET, FILM COATED ORAL DAILY
Qty: 84 TABLET | Refills: 1 | Status: SHIPPED | OUTPATIENT
Start: 2023-01-06 | End: 2023-01-12 | Stop reason: CLARIF

## 2023-01-06 NOTE — PROGRESS NOTES
Identified pt with two pt identifiers(name and ). Reviewed record in preparation for visit and have obtained necessary documentation. Chief Complaint   Patient presents with    New Patient     Establish care    Hepatitis C     Re-establish care w/MLS      Vitals:    23 1508   BP: (!) 134/102   Pulse: 88   Temp: 97.9 °F (36.6 °C)   TempSrc: Temporal   SpO2: 98%   Weight: 270 lb (122.5 kg)   Height: 5' 7\" (1.702 m)   PainSc:   0 - No pain   LMP: 2015       Health Maintenance Review: Patient reminded of \"due or due soon\" health maintenance. I have asked the patient to contact his/her primary care provider (PCP) for follow-up on his/her health maintenance. Coordination of Care Questionnaire:  :   1) Have you been to an emergency room, urgent care, or hospitalized since your last visit? If yes, where when, and reason for visit? Yes. Knee surgery admission at [facility]       2. Have seen or consulted any other health care provider since your last visit? If yes, where when, and reason for visit? NO      Patient is accompanied by self I have received verbal consent from Trena Sykes to discuss any/all medical information while they are present in the room.

## 2023-01-06 NOTE — PROGRESS NOTES
2 Adeline Queen MD, PETER Haynes PA-C Lebron Senior, MD, PETER Salmeron NP        at [address], [address] [address]     [phone]     FAX: [phone]    at [facility]     [address], [address]     [place], [address] [place] [address] - [address]     [phone]     FAX: [phone]         Patient Care Team:  Sarita Montelongo MD as PCP - General (Internal Medicine Physician)  Sarita Montelongo MD as PCP - [address] [place] [address] Provider  Jermain Avila MD (Orthopedic Surgery)      Problem List  Date Reviewed: 11/11/2022            Codes Class Noted    Functional diarrhea ICD-10-CM: K59.1  ICD-9-CM: 564.5  6/16/2021        Insomnia ICD-10-CM: G47.00  ICD-9-CM: 780.52  12/1/2020        Osteochondritis dissecans of left knee ICD-10-CM: M93.262  ICD-9-CM: 732.7  1/14/2016        H/O knee surgery ICD-10-CM: Z98.890  ICD-9-CM: V45.89  6/26/2015        Osteoarthritis ICD-10-CM: M19.90  ICD-9-CM: 715.90  2/25/2015        Depression with anxiety ICD-10-CM: F41.8  ICD-9-CM: 300.4  2/25/2015        Hypothyroidism ICD-10-CM: E03.9  ICD-9-CM: 244.9  9/3/2014        Hyperlipidemia ICD-10-CM: E78.5  ICD-9-CM: 272.4  9/3/2014        Hepatitis C ICD-10-CM: B19.20  ICD-9-CM: 070.70  9/3/2014        ADD (attention deficit disorder) ICD-10-CM: F98.8  ICD-9-CM: 314.00  8/1/2014             The physicians listed above have asked me to see Kirill Young in consultation regarding chronic HCV and its management. All medical records sent by the referring physicians were reviewed. The patient is a 56 y.o.  female who was noted to have abnormalities in liver chemistries and subsequently tested positive for chronic HCV in 2003. Risk factors for acquiring HCV are IV drug use for only 3 months in 1990.   There was no history of acute incteric hepatitis at the time of these risk factors. Imaging of the liver was not performed. A liver biopsy has not been performed. The patient has never received treatment for chronic HCV. The most recent laboratory studies indicate that the liver transaminases are normal, alkaline phosphatase is normal, tests of hepatic synthetic and metabolic function are normal, and the platelet count is normal.      The patient notes fatigue, pain in the right side over the liver. The patient has not experienced problems concentrating, swelling of the abdomen, swelling of the lower extremities, hematemesis, hematochezia. The patient completes all daily activities without any functional limitations. ALLERGIES  Allergies   Allergen Reactions    Tylenol-Codeine #3 [Acetaminophen-Codeine] Unknown (comments)     Takes hydrocodone at home. Has used oxycodone at well       MEDICATIONS  Current Outpatient Medications   Medication Sig    zolpidem (AMBIEN) 10 mg tablet TAKE 1 TABLET BY MOUTH AT NIGHT    colestipoL (COLESTID) 1 gram tablet TAKE 1 TABLET BY MOUTH TWICE DAILY    levothyroxine (SYNTHROID) 75 mcg tablet TAKE 1 TABLET BY MOUTH DAILY BEFORE BREAKFAST    bisoprolol-hydroCHLOROthiazide (ZIAC) 5-6.25 mg per tablet TAKE 1 TABLET BY MOUTH DAILY    omeprazole (PRILOSEC) 40 mg capsule TAKE 1 CAPSULE BY MOUTH EVERY DAY    dextroamphetamine-amphetamine (ADDERALL) 30 mg tablet Take 30 mg by mouth two (2) times a day. ONLY TAKES WHEN WORKING    meloxicam (MOBIC) 15 mg tablet TAKE 1 TABLET BY MOUTH EVERY DAY (Patient not taking: Reported on 1/6/2023)    gabapentin (NEURONTIN) 300 mg capsule Take 1 Capsule by mouth three (3) times daily. Max Daily Amount: 900 mg. (Patient not taking: Reported on 1/6/2023)    escitalopram oxalate (LEXAPRO) 20 mg tablet  (Patient not taking: Reported on 1/6/2023)    ARIPiprazole (ABILIFY) 5 mg tablet Take 5 mg by mouth daily.  (Patient not taking: Reported on 1/6/2023)     No current facility-administered medications for this visit. SYSTEM REVIEW NOT RELATED TO LIVER DISEASE OR REVIEWED ABOVE:  Constitution systems: Negative for fever, chills, weight gain, weight loss. Eyes: Negative for visual changes. ENT: Negative for sore throat, painful swallowing. Respiratory: Negative for cough, hemoptysis, SOB. Cardiology: Negative for chest pain, palpitations. GI:  Negative for constipation or diarrhea. : Negative for urinary frequency, dysuria, hematuria, nocturia. Skin: Negative for rash. Hematology: Negative for easy bruising, blood clots. Musculo-skelatal: Negative for back pain, muscle pain, weakness. Neurologic: Negative for headaches, dizziness, vertigo, memory problems not related to HE. Psychology: Negative for anxiety, depression. FAMILY HISTORY:  The father is alive and healthy. The mother is alive and healthy. There is no family history of liver disease. SOCIAL HISTORY:  The patient is . The patient has 3 children. The patient Is weaning off tobacco by using vapors. The patient has never consumed significant amounts of alcohol. The patient currently works full time from [employer]. PHYSICAL EXAMINATION:  Visit Vitals  BP (!) 134/102 (BP 1 Location: Left arm, BP Patient Position: Sitting, BP Cuff Size: Large adult)   Pulse 88   Temp 97.9 °F (36.6 °C) (Temporal)   Ht 5' 7\" (1.702 m)   Wt 270 lb (122.5 kg)   LMP 04/13/2015   SpO2 98%   BMI 42.29 kg/m²     General: No acute distress. Eyes: Sclera anicteric. ENT: No oral lesions. Thyroid normal.  Nodes: No adenopathy. Skin: No spider angiomata. No jaundice. No palmar erythema. Respiratory: Lungs clear to auscultation. Cardiovascular: Regular heart rate. No murmurs. No JVD. Abdomen: Soft non-tender. Liver size normal to percussion/palpation. Spleen not palpable. No obvious ascites. Extremities: No edema. No muscle wasting. No gross arthritic changes.   Neurologic: Alert and oriented. Cranial nerves grossly intact. No asterixis. LABORATORY STUDIES:  [facility] [place] [facility] 1/21/2015 8/28/2014 10/9/2012   WBC 3.4 - 10.8 x10E3/uL  7.2 7.4   ANC 1.8 - 8.0 K/UL   4.0   HGB 11.1 - 15.9 g/dL  13.9 13.6    - 379 x10E3/uL  263 233   AST 0 - 40 IU/L 19 20 16   ALT 0 - 32 IU/L 21 27 26   Alk Phos 39 - 117 IU/L 73 67 62   Bili, Total 0.0 - 1.2 mg/dL 0.3 0.2 0.3   Albumin 3.5 - 5.5 g/dL 4.8 4.0 3.6   BUN 6 - 24 mg/dL 13 14 9   Creat 0.57 - 1.00 mg/dL 0.67 0.66 0.6   Na 134 - 144 mmol/L 139 141 138   K 3.5 - 5.2 mmol/L 4.5 4.6 4.0   Cl 97 - 108 mmol/L 100 98 103   CO2 18 - 29 mmol/L 24 24 29   Glucose 65 - 99 mg/dL 129 (H) 88 102 (H)     SEROLOGIES:  8/2014. Anti-HCV negative, anti-HIV negative    LIVER HISTOLOGY:  Not available or performed    ENDOSCOPIC PROCEDURES:  Not available or performed    RADIOLOGY:  Not available or performed    OTHER TESTING:  Not available or performed    ASSESSMENT AND PLAN:  Anti-HCV positive. The liver enzymes are normal.  Liver function is normal.  The platelet count is normal.      Will perform laboratory testing to monitor liver function and degree of liver injury. This will include BMP, hepatic panel, CBC with platelet count, INR. Will perform and/or review results of HCV viral load and HCV genotype to define the specific treatment and duration of treatment that will be required. THe liver enzymes are in the low normal range. The patient may have had spontaneous resolution and I would not be surprised if HCV RNA was undetectable. If HCV RNA negative will repeat the test.  Once we have 2 negative tests we are certain HVC has resolved spontaneously. The patient notes that she had a recent elevation in live enzymes. If HCV RNA undetectable this elevation is probably from fatty liver.   If this is the case, or if the ultrasound suggests there is chronic disease or steatosis will proceed with fibroscan. Will perform serologic and virologic studies to assess for other causes of chronic liver disease. Will perform imaging of the liver with ultrasound. The need to perform an assessment of liver fibrosis was discussed with the patient. Liver biopsy has been used to assess the degree of fibrosis and the need for HCV treatment. The risks of performing the liver biopsy including pain, puncture of the lung, gallbladder, intestine or kidney and bleeding were discussed. The Fibroscan can assess liver fibrosis and determine if a patient has advanced fibrosis or cirrhosis without the need for liver biopsy. The Fibroscan is currently available at [facility]. This will be scheduled. The patient has not previously been treated for HCV. Discussed the treatment alternatives if she is HCV RNA positive. The SVR/cure rate for HCV now exceeds 90% with just oral anti-viral therapy and no interferon injections or significant side effects for most patients with HCV. The specific treatment is dependent upon genotype, viral load and histology. Unfortunately most insurance carriers will only provide HCV medication for patients with advanced fibrosis or cirrhosis. We will request pre-authorization for the HCV medication but approval will be subject to guidelines established by the patient's insurance carrier. If the patient is denied we may appeal on their behalf. The patient was directed to continue all current medications at the current dosages. There are no contraindications for the patient to take any medications that are necessary for treatment of other medical issues. The patient was counseled regarding alcohol consumption. The need for vaccination against viral hepatitis A and B will be assessed with serologic and instituted as appropriate. All of the above issues were discussed with the patient. All questions were answered.   The patient expressed a clear understanding of the above. [address] [id number] in 2-4 weeks to review all data, perform Fibroscan if necessary and determine the treatment plan.     Bowen Tavera MD  [facility] [place] [facility] [address] [place] [address] [address] [facility] [address]  [address]  [phone]

## 2023-01-06 NOTE — Clinical Note
1/22/2023    Patient: Ankita Fletcher   YOB: 1965   Date of Visit: 1/6/2023     Dylon Weston MD  [address] [place] [address]  [address] [facility] [address]    Dear Dylon Weston MD,      Thank you for referring Ms. Candance Griffon to [address] for evaluation. My notes for this consultation are attached. If you have questions, please do not hesitate to call me. I look forward to following your patient along with you.       Sincerely,    Светлана Villegas MD

## 2023-01-07 LAB
HBV CORE AB SERPL QL IA: NEGATIVE
HBV SURFACE AB SER QL: NON REACTIVE
HBV SURFACE AG SERPL QL IA: NEGATIVE
HCV RNA SERPL NAA+PROBE-ACNC: NORMAL IU/ML
HCV RNA SERPL NAA+PROBE-LOG IU: 4.36 LOG10 IU/ML
TEST INFORMATION: NORMAL

## 2023-01-11 DIAGNOSIS — I10 ESSENTIAL HYPERTENSION: ICD-10-CM

## 2023-01-11 DIAGNOSIS — R12 HEART BURN: ICD-10-CM

## 2023-01-11 RX ORDER — BISOPROLOL FUMARATE AND HYDROCHLOROTHIAZIDE 5; 6.25 MG/1; MG/1
TABLET ORAL
Qty: 90 TABLET | Refills: 0 | Status: SHIPPED | OUTPATIENT
Start: 2023-01-11

## 2023-01-11 RX ORDER — OMEPRAZOLE 40 MG/1
CAPSULE, DELAYED RELEASE ORAL
Qty: 90 CAPSULE | Refills: 0 | Status: SHIPPED | OUTPATIENT
Start: 2023-01-11

## 2023-01-12 ENCOUNTER — DOCUMENTATION ONLY (OUTPATIENT)
Dept: HEMATOLOGY | Age: 58
End: 2023-01-12

## 2023-01-12 RX ORDER — VELPATASVIR AND SOFOSBUVIR 100; 400 MG/1; MG/1
1 TABLET, FILM COATED ORAL DAILY
Qty: 28 TABLET | Refills: 2 | Status: SHIPPED | OUTPATIENT
Start: 2023-01-12

## 2023-01-12 NOTE — PROGRESS NOTES
Called patient to let her know that her Hep C medication was changed from [address] to [place] due to insurance purposes. Also that she is not to take the current dose of omeprazole. Only 20mg omeprazole has been approved for use with Epclusa. She was told to take half the dose (to equal 20mg) or stop taking omeprazole while on Epclusa. Left message on voicemail.

## 2023-01-25 DIAGNOSIS — E03.9 ACQUIRED HYPOTHYROIDISM: ICD-10-CM

## 2023-01-25 DIAGNOSIS — R12 HEART BURN: ICD-10-CM

## 2023-01-25 RX ORDER — OMEPRAZOLE 40 MG/1
CAPSULE, DELAYED RELEASE ORAL
Qty: 90 CAPSULE | Refills: 0 | Status: SHIPPED | OUTPATIENT
Start: 2023-01-25

## 2023-01-25 RX ORDER — LEVOTHYROXINE SODIUM 75 UG/1
TABLET ORAL
Qty: 90 TABLET | Refills: 0 | Status: SHIPPED | OUTPATIENT
Start: 2023-01-25

## 2023-01-27 ENCOUNTER — PATIENT MESSAGE (OUTPATIENT)
Dept: HEMATOLOGY | Age: 58
End: 2023-01-27

## 2023-01-27 DIAGNOSIS — B18.2 CHRONIC HEPATITIS C WITHOUT HEPATIC COMA (HCC): Primary | ICD-10-CM

## 2023-01-27 RX ORDER — VELPATASVIR AND SOFOSBUVIR 100; 400 MG/1; MG/1
1 TABLET, FILM COATED ORAL DAILY
Qty: 28 TABLET | Refills: 2 | Status: ACTIVE | OUTPATIENT
Start: 2023-01-27

## 2023-01-27 NOTE — TELEPHONE ENCOUNTER
Attempted to call patient, no answer, I left a VM to let her know her recent labwork was negative for Hep B. Also, I ordered an Ultrasound since we don't have one on file  Our scheduling department should call her in a couple of days, if they do not the number to schedule your ultrasound is [phone].

## 2023-01-31 ENCOUNTER — OFFICE VISIT (OUTPATIENT)
Dept: PRIMARY CARE CLINIC | Age: 58
End: 2023-01-31
Payer: COMMERCIAL

## 2023-01-31 VITALS
HEIGHT: 67 IN | RESPIRATION RATE: 17 BRPM | TEMPERATURE: 97.5 F | SYSTOLIC BLOOD PRESSURE: 118 MMHG | OXYGEN SATURATION: 96 % | WEIGHT: 265.8 LBS | BODY MASS INDEX: 41.72 KG/M2 | HEART RATE: 80 BPM | DIASTOLIC BLOOD PRESSURE: 83 MMHG

## 2023-01-31 DIAGNOSIS — F32.9 REACTIVE DEPRESSION: ICD-10-CM

## 2023-01-31 DIAGNOSIS — H04.123 BILATERAL DRY EYES: ICD-10-CM

## 2023-01-31 DIAGNOSIS — B18.2 CHRONIC HEPATITIS C WITHOUT HEPATIC COMA (HCC): ICD-10-CM

## 2023-01-31 DIAGNOSIS — E03.9 ACQUIRED HYPOTHYROIDISM: ICD-10-CM

## 2023-01-31 DIAGNOSIS — E78.2 MIXED HYPERLIPIDEMIA: ICD-10-CM

## 2023-01-31 DIAGNOSIS — L21.9 DERMATITIS, SEBORRHEIC: ICD-10-CM

## 2023-01-31 DIAGNOSIS — F51.01 PRIMARY INSOMNIA: ICD-10-CM

## 2023-01-31 DIAGNOSIS — I10 PRIMARY HYPERTENSION: ICD-10-CM

## 2023-01-31 DIAGNOSIS — I10 PRIMARY HYPERTENSION: Primary | ICD-10-CM

## 2023-01-31 PROCEDURE — 99214 OFFICE O/P EST MOD 30 MIN: CPT | Performed by: INTERNAL MEDICINE

## 2023-01-31 PROCEDURE — 3074F SYST BP LT 130 MM HG: CPT | Performed by: INTERNAL MEDICINE

## 2023-01-31 PROCEDURE — 3079F DIAST BP 80-89 MM HG: CPT | Performed by: INTERNAL MEDICINE

## 2023-01-31 RX ORDER — VORTIOXETINE 10 MG/1
10 TABLET, FILM COATED ORAL DAILY
COMMUNITY
Start: 2023-01-26

## 2023-01-31 RX ORDER — ESZOPICLONE 2 MG/1
TABLET, FILM COATED ORAL
COMMUNITY
Start: 2023-01-25

## 2023-01-31 RX ORDER — BISOPROLOL FUMARATE AND HYDROCHLOROTHIAZIDE 10; 6.25 MG/1; MG/1
1 TABLET ORAL DAILY
Qty: 90 TABLET | Refills: 0 | Status: SHIPPED | OUTPATIENT
Start: 2023-01-31 | End: 2023-05-01

## 2023-01-31 NOTE — PROGRESS NOTES
Basilia Espinosa (: 1965) is a 56 y.o. female, established patient, here for evaluation of the following chief complaint(s):  Follow-up (BP and Lab), Dry Eye, and Medication Evaluation       ASSESSMENT/PLAN:  Below is the assessment and plan developed based on review of pertinent history, physical exam, labs, studies, and medications. 1. Primary hypertension  -     bisoprolol-hydroCHLOROthiazide (ZIAC) 10-6.25 mg per tablet; Take 1 Tablet by mouth daily for 90 days. , Normal, Disp-90 Tablet, R-0 sent to pharmacy.   -     METABOLIC PANEL, COMPREHENSIVE; Future  -     CBC W/O DIFF; Future  I increased ziac from 5 to 10 mg once per day to manage her blood pressure. I ordered CMP and CBC. 2. Reactive depression  I recommend that she continue taking trintellix 10 mg once a day for depression. 3. Chronic hepatitis C without hepatic coma ([facility] [address]  She is followed by Dr. Uzair Feliciano (Hepatology). Still awaiting for insurance approval to start Antiviral.    4. Acquired hypothyroidism  -     TSH 3RD GENERATION; Future  I ordered blood work to evaluate TSH levels. 5. Primary insomnia  I recommended her to make an appointment with psychiatry for insomnia and take Advil PM with melatonin. 6. Bilateral dry eyes  I recommend taking 1 tsp of [place] fish oil for dry eyes. 7. Mixed hyperlipidemia  -     LIPID PANEL; Future  I ordered a lipid panel. 8. Dermatitis, seborrheic  -     REFERRAL TO DERMATOLOGY  I referred her to Dermatology. SUBJECTIVE/OBJECTIVE:  HPI  Patient presents today for a follow up. She is fasting today. She has noticed a brown spot on her face growing with another spot underneath it. It is rough and she puts lotion on it. She is followed by Dermatology. She doubled up on her blood pressure medications. She is not taking cholesterol medication anymore. She went for surgery for a torn meniscus. She saw Psychiatry with Dr. Radha Osuna. She has gained 50 lbs from taking lexapro.  She stopped taking Abilify  30 mg and lexapro and went through withrdrwals. She is taking trintellix 10 mg and is not as overly sensitive. She does not want to take the second medication. She is working from home and still suffers from PTSD. She is followed by  (Heptology) and has lost 5 pounds and is taking an antiviral HEP-C medication. She started Atkins diet on Jan 6. She has a sedentary lifestyle. She is not taking [place] and is now taking lunesta 2 mg and cannot fall asleep and can only fall asleep by using tylenol PM. She tried trasidone and that did not work either. She is concerned about the possible diagnosis of diabetes and inquired about estrogen. Her eyes are extremely dry. She has not seen an eye doctor. Patient Active Problem List   Diagnosis Code    ADD (attention deficit disorder) F98.8    Hypothyroidism E03.9    Hyperlipidemia E78.5    Chronic hepatitis C (HCC) B18.2    Osteoarthritis M19.90    Depression with anxiety F41.8    H/O knee surgery Z98.890    Osteochondritis dissecans of left knee M93.262    Functional diarrhea K59.1    Insomnia G47.00    History of cholecystectomy Z90.49        Current Outpatient Medications on File Prior to Visit   Medication Sig Dispense Refill    Trintellix 10 mg tablet Take 10 mg by mouth daily. eszopiclone (LUNESTA) 2 mg tablet TAKE 1 TABLET BY MOUTH AT NIGHT      sofosbuvir-velpatasvir (Epclusa) 400-100 mg tablet Take 1 Tablet by mouth daily. 28 Tablet 2    levothyroxine (SYNTHROID) 75 mcg tablet TAKE 1 TABLET BY MOUTH DAILY BEFORE BREAKFAST 90 Tablet 0    omeprazole (PRILOSEC) 40 mg capsule TAKE 1 CAPSULE BY MOUTH EVERY DAY 90 Capsule 0    dextroamphetamine-amphetamine (ADDERALL) 30 mg tablet Take 30 mg by mouth two (2) times a day.  ONLY TAKES WHEN WORKING      [DISCONTINUED] bisoprolol-hydroCHLOROthiazide (ZIAC) 5-6.25 mg per tablet TAKE 1 TABLET BY MOUTH DAILY 90 Tablet 0    [DISCONTINUED] zolpidem (AMBIEN) 10 mg tablet TAKE 1 TABLET BY MOUTH AT NIGHT      colestipoL (COLESTID) 1 gram tablet TAKE 1 TABLET BY MOUTH TWICE DAILY 60 Tablet 0     No current facility-administered medications on file prior to visit. Allergies   Allergen Reactions    Tylenol-Codeine #3 [Acetaminophen-Codeine] Unknown (comments)     Takes hydrocodone at home.  Has used oxycodone at well       Past Medical History:   Diagnosis Date    Arthritis     Colitis     Colitis, ulcerative ([facility] [address]     Family history of skin cancer     Functional diarrhea 2021    GERD (gastroesophageal reflux disease)     Ill-defined condition     ADHD    Ill-defined condition     Hep C    Liver disease     HEPATITIS C- NEVER TREATED    Nausea & vomiting     Psychiatric disorder     ADD    Skin cancer     BCC    Symptomatic cholelithiasis 2020    Thyroid disease     Ulcerative colitis ([facility] [address]        Past Surgical History:   Procedure Laterality Date    HX CHOLECYSTECTOMY  2020    HX KNEE ARTHROSCOPY      x3- last done 2015 to harvest cartilage    HX ORTHOPAEDIC Left 2016    articular cartilage transplant    HX TONSILLECTOMY  1988       Family History   Problem Relation Age of Onset    Cancer Mother         melanoma    Hypertension Father     Cancer Father         melanoma    OSTEOARTHRITIS Father     No Known Problems Sister     No Known Problems Son     No Known Problems Daughter     No Known Problems Daughter     Anesth Problems Neg Hx        Social History     Socioeconomic History    Marital status:      Spouse name: Not on file    Number of children: Not on file    Years of education: Not on file    Highest education level: Not on file   Occupational History    Not on file   Tobacco Use    Smoking status: Former     Types: Cigarettes     Quit date:      Years since quittin.1    Smokeless tobacco: Former    Tobacco comments:     SMOKES 1 CIG PER MONTH   Vaping Use    Vaping Use: Never used   Substance and Sexual Activity    Alcohol use: No    Drug use: No    Sexual activity: Never   Other Topics Concern    Not on file   Social History Narrative    Not on file     Social Determinants of Health     Financial Resource Strain: Not on file   Food Insecurity: Not on file   Transportation Needs: Not on file   Physical Activity: Not on file   Stress: Not on file   Social Connections: Not on file   Intimate Partner Violence: Not on file   Housing Stability: Not on file       Orders Only on 01/06/2023   Component Date Value Ref Range Status    HEP B SURFACE AB, QUAL 01/06/2023 Non Reactive   Final    Comment:               Non Reactive: Inconsistent with immunity,                              less than 10 mIU/mL                Reactive:     Consistent with immunity,                              greater than 9.9 mIU/mL      Hep B surface Ag screen 01/06/2023 Negative  Negative Final    Hep B Core Ab, total 01/06/2023 Negative  Negative Final    HCV RNA, Quantitation 01/06/2023 23,100  IU/mL Final    Comment:                 HCV RNA detected  HCV RNA viral loads >/= 25 IU/mL indicate current HCV infection. HCV RNA, log10 01/06/2023 4.364  log10 IU/mL Final    TEST INFORMATION 01/06/2023 Comment   Final    The quantitative range of this assay is 15 IU/mL to 100 million IU/mL. Review of Systems   Constitutional:  Negative for activity change, fatigue and unexpected weight change. HENT:  Negative for congestion, hearing loss, rhinorrhea and sore throat. Eyes:  Negative for discharge. Dry eyes   Respiratory:  Negative for cough, chest tightness and shortness of breath. Cardiovascular:  Negative for leg swelling. Gastrointestinal:  Negative for abdominal pain, constipation and diarrhea. Genitourinary:  Negative for dysuria, flank pain, frequency and urgency. Musculoskeletal:  Negative for arthralgias, back pain and myalgias. Skin:  Positive for rash. Negative for color change.    Neurological:  Negative for dizziness, light-headedness and headaches. Psychiatric/Behavioral:  Positive for dysphoric mood and sleep disturbance. The patient is not nervous/anxious. Visit Vitals  /83 (BP 1 Location: Right arm)   Pulse 80   Temp 97.5 °F (36.4 °C)   Resp 17   Ht 5' 7\" (1.702 m)   Wt 265 lb 12.8 oz (120.6 kg)   LMP 04/13/2015   SpO2 96%   BMI 41.63 kg/m²       Physical Exam  Vitals and nursing note reviewed. Constitutional:       General: She is not in acute distress. Appearance: Normal appearance. She is morbidly obese. She is not diaphoretic. HENT:      Right Ear: External ear normal.      Left Ear: External ear normal.   Eyes:      General: No scleral icterus. Right eye: No discharge. Left eye: No discharge. Extraocular Movements: Extraocular movements intact. Conjunctiva/sclera: Conjunctivae normal.   Cardiovascular:      Rate and Rhythm: Normal rate and regular rhythm. Pulmonary:      Effort: Pulmonary effort is normal.      Breath sounds: Normal breath sounds. No wheezing. Abdominal:      General: Bowel sounds are normal.      Palpations: Abdomen is soft. Tenderness: There is no abdominal tenderness. Musculoskeletal:      Cervical back: Normal range of motion and neck supple. Lymphadenopathy:      Cervical: No cervical adenopathy. Neurological:      Mental Status: She is alert and oriented to person, place, and time. Psychiatric:         Mood and Affect: Mood and affect normal.         I, Dago Alegre MD, personally performed the services described in this documentation as scribed by Placido Orourke in my presence, and it is both accurate and complete. An electronic signature was used to authenticate this note.   -- Placido Orourke

## 2023-01-31 NOTE — PROGRESS NOTES
Chief Complaint   Patient presents with    Follow-up     BP and Lab    Dry Eye    Medication Evaluation       Visit Vitals  /83 (BP 1 Location: Right arm)   Pulse 80   Temp 97.5 °F (36.4 °C)   Resp 17   Ht 5' 7\" (1.702 m)   Wt 265 lb 12.8 oz (120.6 kg)   LMP 04/13/2015   SpO2 96%   BMI 41.63 kg/m²       1. Have you been to the ER, urgent care clinic since your last visit? Hospitalized since your last visit? No    2. Have you seen or consulted any other health care providers outside of the [address] [place] [address] since your last visit? Include any pap smears or colon screening.  Yes Dr Jerardo Duarte- Psychiatry

## 2023-02-01 LAB
ALBUMIN SERPL-MCNC: 4.2 G/DL (ref 3.5–5)
ALBUMIN/GLOB SERPL: 1.3 (ref 1.1–2.2)
ALP SERPL-CCNC: 88 U/L (ref 45–117)
ALT SERPL-CCNC: 46 U/L (ref 12–78)
ANION GAP SERPL CALC-SCNC: 6 MMOL/L (ref 5–15)
AST SERPL-CCNC: 28 U/L (ref 15–37)
BILIRUB SERPL-MCNC: 0.6 MG/DL (ref 0.2–1)
BUN SERPL-MCNC: 24 MG/DL (ref 6–20)
BUN/CREAT SERPL: 34 (ref 12–20)
CALCIUM SERPL-MCNC: 9.5 MG/DL (ref 8.5–10.1)
CHLORIDE SERPL-SCNC: 101 MMOL/L (ref 97–108)
CHOLEST SERPL-MCNC: 205 MG/DL
CO2 SERPL-SCNC: 29 MMOL/L (ref 21–32)
CREAT SERPL-MCNC: 0.7 MG/DL (ref 0.55–1.02)
ERYTHROCYTE [DISTWIDTH] IN BLOOD BY AUTOMATED COUNT: 12.4 % (ref 11.5–14.5)
GLOBULIN SER CALC-MCNC: 3.2 G/DL (ref 2–4)
GLUCOSE SERPL-MCNC: 103 MG/DL (ref 65–100)
HCT VFR BLD AUTO: 43.1 % (ref 35–47)
HDLC SERPL-MCNC: 52 MG/DL
HDLC SERPL: 3.9 (ref 0–5)
HGB BLD-MCNC: 14.2 G/DL (ref 11.5–16)
LDLC SERPL CALC-MCNC: 135.4 MG/DL (ref 0–100)
MCH RBC QN AUTO: 30.1 PG (ref 26–34)
MCHC RBC AUTO-ENTMCNC: 32.9 G/DL (ref 30–36.5)
MCV RBC AUTO: 91.3 FL (ref 80–99)
NRBC # BLD: 0 K/UL (ref 0–0.01)
NRBC BLD-RTO: 0 PER 100 WBC
PLATELET # BLD AUTO: 288 K/UL (ref 150–400)
PMV BLD AUTO: 9.9 FL (ref 8.9–12.9)
POTASSIUM SERPL-SCNC: 4.2 MMOL/L (ref 3.5–5.1)
PROT SERPL-MCNC: 7.4 G/DL (ref 6.4–8.2)
RBC # BLD AUTO: 4.72 M/UL (ref 3.8–5.2)
SODIUM SERPL-SCNC: 136 MMOL/L (ref 136–145)
TRIGL SERPL-MCNC: 88 MG/DL (ref ?–150)
TSH SERPL DL<=0.05 MIU/L-ACNC: 8.62 UIU/ML (ref 0.36–3.74)
VLDLC SERPL CALC-MCNC: 17.6 MG/DL
WBC # BLD AUTO: 6.6 K/UL (ref 3.6–11)

## 2023-02-02 DIAGNOSIS — E03.9 ACQUIRED HYPOTHYROIDISM: Primary | ICD-10-CM

## 2023-02-02 RX ORDER — LEVOTHYROXINE SODIUM 100 UG/1
100 TABLET ORAL
Qty: 90 TABLET | Refills: 0 | Status: SHIPPED | OUTPATIENT
Start: 2023-02-02 | End: 2023-05-03

## 2023-02-03 NOTE — PROGRESS NOTES
Results reviewed. Release via [facility], it was nice seeing you in the office. Your blood report is back and TSH came back high. I will send a new prescription to the pharmacy. Please start taking it. Your cholesterol came back high as well. When TSH goes up cholesterol can go along with it. YOu can continue Colestid. I  would recommend low carb diet ( cutting down on Pasta, Rice and white bread) avoiding fried food. Also, cardio exercise 3-4 times a week or at least 1-2 miles walk daily.

## 2023-05-08 RX ORDER — LEVOTHYROXINE SODIUM 0.07 MG/1
TABLET ORAL
Qty: 90 TABLET | Refills: 0 | Status: SHIPPED | OUTPATIENT
Start: 2023-05-08

## 2023-07-14 RX ORDER — LEVOTHYROXINE SODIUM 0.07 MG/1
TABLET ORAL
Qty: 90 TABLET | Refills: 0 | Status: SHIPPED | OUTPATIENT
Start: 2023-07-14

## 2023-07-14 RX ORDER — LEVOTHYROXINE SODIUM 0.1 MG/1
TABLET ORAL
Qty: 90 TABLET | Refills: 0 | Status: SHIPPED | OUTPATIENT
Start: 2023-07-14

## 2023-07-14 NOTE — TELEPHONE ENCOUNTER
Requested Prescriptions     Pending Prescriptions Disp Refills    levothyroxine (SYNTHROID) 75 MCG tablet [Pharmacy Med Name: LEVOTHYROXINE 0.075MG (75MCG) TABS] 90 tablet 0     Sig: TAKE 1 TABLET BY MOUTH DAILY BEFORE BREAKFAST    levothyroxine (SYNTHROID) 100 MCG tablet [Pharmacy Med Name: LEVOTHYROXINE 0.100MG (100MCG) TAB] 90 tablet      Sig: TAKE 1 TABLET BY MOUTH DAILY BEFORE BREAKFAST

## 2023-07-20 RX ORDER — BISOPROLOL FUMARATE AND HYDROCHLOROTHIAZIDE 5; 6.25 MG/1; MG/1
TABLET ORAL
Qty: 90 TABLET | Refills: 0 | Status: SHIPPED | OUTPATIENT
Start: 2023-07-20

## 2023-07-20 RX ORDER — OMEPRAZOLE 40 MG/1
CAPSULE, DELAYED RELEASE ORAL
Qty: 90 CAPSULE | Refills: 0 | Status: SHIPPED | OUTPATIENT
Start: 2023-07-20

## 2023-10-23 RX ORDER — BISOPROLOL FUMARATE AND HYDROCHLOROTHIAZIDE 5; 6.25 MG/1; MG/1
TABLET ORAL
Qty: 90 TABLET | Refills: 0 | Status: SHIPPED | OUTPATIENT
Start: 2023-10-23

## 2023-10-25 DIAGNOSIS — E03.9 HYPOTHYROIDISM, UNSPECIFIED TYPE: Primary | ICD-10-CM

## 2023-10-25 RX ORDER — LEVOTHYROXINE SODIUM 0.1 MG/1
TABLET ORAL
Qty: 90 TABLET | Refills: 0 | Status: SHIPPED | OUTPATIENT
Start: 2023-10-25

## 2023-10-25 RX ORDER — OMEPRAZOLE 40 MG/1
CAPSULE, DELAYED RELEASE ORAL
Qty: 90 CAPSULE | Refills: 0 | Status: SHIPPED | OUTPATIENT
Start: 2023-10-25

## 2024-01-11 RX ORDER — BISOPROLOL FUMARATE AND HYDROCHLOROTHIAZIDE 10; 6.25 MG/1; MG/1
1 TABLET ORAL DAILY
Qty: 90 TABLET | OUTPATIENT
Start: 2024-01-11

## 2024-02-07 ENCOUNTER — OFFICE VISIT (OUTPATIENT)
Dept: PRIMARY CARE CLINIC | Facility: CLINIC | Age: 59
End: 2024-02-07
Payer: COMMERCIAL

## 2024-02-07 VITALS
RESPIRATION RATE: 20 BRPM | BODY MASS INDEX: 43.63 KG/M2 | HEIGHT: 67 IN | WEIGHT: 278 LBS | OXYGEN SATURATION: 96 % | HEART RATE: 82 BPM | TEMPERATURE: 97.1 F | SYSTOLIC BLOOD PRESSURE: 123 MMHG | DIASTOLIC BLOOD PRESSURE: 82 MMHG

## 2024-02-07 DIAGNOSIS — M25.641 STIFFNESS OF JOINTS OF BOTH HANDS: ICD-10-CM

## 2024-02-07 DIAGNOSIS — M25.642 STIFFNESS OF JOINTS OF BOTH HANDS: ICD-10-CM

## 2024-02-07 DIAGNOSIS — Z11.59 NEED FOR HEPATITIS B SCREENING TEST: ICD-10-CM

## 2024-02-07 DIAGNOSIS — Z12.11 COLON CANCER SCREENING: ICD-10-CM

## 2024-02-07 DIAGNOSIS — I10 PRIMARY HYPERTENSION: ICD-10-CM

## 2024-02-07 DIAGNOSIS — E03.9 ACQUIRED HYPOTHYROIDISM: ICD-10-CM

## 2024-02-07 DIAGNOSIS — R63.1 INCREASED THIRST: ICD-10-CM

## 2024-02-07 DIAGNOSIS — F32.9 REACTIVE DEPRESSION: ICD-10-CM

## 2024-02-07 DIAGNOSIS — R35.0 URINE FREQUENCY: Primary | ICD-10-CM

## 2024-02-07 DIAGNOSIS — R73.02 IGT (IMPAIRED GLUCOSE TOLERANCE): ICD-10-CM

## 2024-02-07 DIAGNOSIS — R63.5 UNINTENDED WEIGHT GAIN: ICD-10-CM

## 2024-02-07 LAB
ALBUMIN SERPL-MCNC: 3.4 G/DL (ref 3.5–5)
ALBUMIN/GLOB SERPL: 1.1 (ref 1.1–2.2)
ALP SERPL-CCNC: 115 U/L (ref 45–117)
ALT SERPL-CCNC: 27 U/L (ref 12–78)
ANION GAP SERPL CALC-SCNC: 2 MMOL/L (ref 5–15)
AST SERPL-CCNC: 23 U/L (ref 15–37)
BILIRUB SERPL-MCNC: 0.4 MG/DL (ref 0.2–1)
BILIRUBIN, URINE, POC: NEGATIVE
BLOOD URINE, POC: NEGATIVE
BUN SERPL-MCNC: 11 MG/DL (ref 6–20)
BUN/CREAT SERPL: 18 (ref 12–20)
CALCIUM SERPL-MCNC: 8.5 MG/DL (ref 8.5–10.1)
CHLORIDE SERPL-SCNC: 103 MMOL/L (ref 97–108)
CHOLEST SERPL-MCNC: 196 MG/DL
CO2 SERPL-SCNC: 30 MMOL/L (ref 21–32)
CREAT SERPL-MCNC: 0.61 MG/DL (ref 0.55–1.02)
ERYTHROCYTE [DISTWIDTH] IN BLOOD BY AUTOMATED COUNT: 15 % (ref 11.5–14.5)
EST. AVERAGE GLUCOSE BLD GHB EST-MCNC: 114 MG/DL
GLOBULIN SER CALC-MCNC: 3.2 G/DL (ref 2–4)
GLUCOSE SERPL-MCNC: 116 MG/DL (ref 65–100)
GLUCOSE URINE, POC: NEGATIVE
HBA1C MFR BLD: 5.6 % (ref 4–5.6)
HCT VFR BLD AUTO: 38.9 % (ref 35–47)
HDLC SERPL-MCNC: 45 MG/DL
HDLC SERPL: 4.4 (ref 0–5)
HGB BLD-MCNC: 12.5 G/DL (ref 11.5–16)
KETONES, URINE, POC: NEGATIVE
LDLC SERPL CALC-MCNC: 115 MG/DL (ref 0–100)
LEUKOCYTE ESTERASE, URINE, POC: NEGATIVE
MCH RBC QN AUTO: 28.3 PG (ref 26–34)
MCHC RBC AUTO-ENTMCNC: 32.1 G/DL (ref 30–36.5)
MCV RBC AUTO: 88 FL (ref 80–99)
NITRITE, URINE, POC: NEGATIVE
NRBC # BLD: 0 K/UL (ref 0–0.01)
NRBC BLD-RTO: 0 PER 100 WBC
PH, URINE, POC: 7 (ref 4.6–8)
PLATELET # BLD AUTO: 272 K/UL (ref 150–400)
PMV BLD AUTO: 9.4 FL (ref 8.9–12.9)
POTASSIUM SERPL-SCNC: 4.2 MMOL/L (ref 3.5–5.1)
PROT SERPL-MCNC: 6.6 G/DL (ref 6.4–8.2)
PROTEIN,URINE, POC: NEGATIVE
RBC # BLD AUTO: 4.42 M/UL (ref 3.8–5.2)
SODIUM SERPL-SCNC: 135 MMOL/L (ref 136–145)
SPECIFIC GRAVITY, URINE, POC: 1.01 (ref 1–1.03)
T4 FREE SERPL-MCNC: 0.8 NG/DL (ref 0.8–1.5)
TRIGL SERPL-MCNC: 180 MG/DL
TSH SERPL DL<=0.05 MIU/L-ACNC: 9.16 UIU/ML (ref 0.36–3.74)
URINALYSIS CLARITY, POC: NORMAL
URINALYSIS COLOR, POC: YELLOW
UROBILINOGEN, POC: NORMAL
VLDLC SERPL CALC-MCNC: 36 MG/DL
WBC # BLD AUTO: 8.4 K/UL (ref 3.6–11)

## 2024-02-07 PROCEDURE — 99214 OFFICE O/P EST MOD 30 MIN: CPT | Performed by: INTERNAL MEDICINE

## 2024-02-07 PROCEDURE — 3079F DIAST BP 80-89 MM HG: CPT | Performed by: INTERNAL MEDICINE

## 2024-02-07 PROCEDURE — 81001 URINALYSIS AUTO W/SCOPE: CPT | Performed by: INTERNAL MEDICINE

## 2024-02-07 PROCEDURE — 3074F SYST BP LT 130 MM HG: CPT | Performed by: INTERNAL MEDICINE

## 2024-02-07 RX ORDER — ESCITALOPRAM OXALATE 20 MG/1
20 TABLET ORAL DAILY
COMMUNITY

## 2024-02-07 SDOH — ECONOMIC STABILITY: FOOD INSECURITY: WITHIN THE PAST 12 MONTHS, THE FOOD YOU BOUGHT JUST DIDN'T LAST AND YOU DIDN'T HAVE MONEY TO GET MORE.: NEVER TRUE

## 2024-02-07 SDOH — ECONOMIC STABILITY: INCOME INSECURITY: HOW HARD IS IT FOR YOU TO PAY FOR THE VERY BASICS LIKE FOOD, HOUSING, MEDICAL CARE, AND HEATING?: NOT HARD AT ALL

## 2024-02-07 SDOH — ECONOMIC STABILITY: FOOD INSECURITY: WITHIN THE PAST 12 MONTHS, YOU WORRIED THAT YOUR FOOD WOULD RUN OUT BEFORE YOU GOT MONEY TO BUY MORE.: NEVER TRUE

## 2024-02-07 SDOH — ECONOMIC STABILITY: HOUSING INSECURITY
IN THE LAST 12 MONTHS, WAS THERE A TIME WHEN YOU DID NOT HAVE A STEADY PLACE TO SLEEP OR SLEPT IN A SHELTER (INCLUDING NOW)?: NO

## 2024-02-07 ASSESSMENT — PATIENT HEALTH QUESTIONNAIRE - PHQ9
SUM OF ALL RESPONSES TO PHQ QUESTIONS 1-9: 0
SUM OF ALL RESPONSES TO PHQ QUESTIONS 1-9: 0
SUM OF ALL RESPONSES TO PHQ9 QUESTIONS 1 & 2: 0
1. LITTLE INTEREST OR PLEASURE IN DOING THINGS: 0
SUM OF ALL RESPONSES TO PHQ QUESTIONS 1-9: 0
SUM OF ALL RESPONSES TO PHQ QUESTIONS 1-9: 0
2. FEELING DOWN, DEPRESSED OR HOPELESS: 0

## 2024-02-07 ASSESSMENT — ENCOUNTER SYMPTOMS
BACK PAIN: 0
ABDOMINAL PAIN: 0
DIARRHEA: 0
RHINORRHEA: 0
COLOR CHANGE: 0
COUGH: 0
CHEST TIGHTNESS: 0
SORE THROAT: 0
CONSTIPATION: 0
EYE DISCHARGE: 0

## 2024-02-08 DIAGNOSIS — E03.9 ACQUIRED HYPOTHYROIDISM: Primary | ICD-10-CM

## 2024-02-08 LAB
HBV SURFACE AB SER QL: NONREACTIVE
HBV SURFACE AB SER-ACNC: <3.1 MIU/ML

## 2024-02-08 RX ORDER — LEVOTHYROXINE SODIUM 0.12 MG/1
125 TABLET ORAL DAILY
Qty: 90 TABLET | Refills: 0 | Status: SHIPPED | OUTPATIENT
Start: 2024-02-08 | End: 2024-05-08

## 2024-02-09 DIAGNOSIS — R21 RASH AND NONSPECIFIC SKIN ERUPTION: Primary | ICD-10-CM

## 2024-02-09 LAB
CCP IGA+IGG SERPL IA-ACNC: 5 UNITS (ref 0–19)
RHEUMATOID FACT SERPL-ACNC: <10 IU/ML

## 2024-02-16 RX ORDER — OMEPRAZOLE 40 MG/1
CAPSULE, DELAYED RELEASE ORAL
Qty: 90 CAPSULE | Refills: 0 | Status: SHIPPED | OUTPATIENT
Start: 2024-02-16

## 2024-03-08 RX ORDER — BISOPROLOL FUMARATE AND HYDROCHLOROTHIAZIDE 5; 6.25 MG/1; MG/1
TABLET ORAL
Qty: 90 TABLET | Refills: 0 | OUTPATIENT
Start: 2024-03-08

## 2024-04-30 ENCOUNTER — COMMUNITY OUTREACH (OUTPATIENT)
Dept: PRIMARY CARE CLINIC | Facility: CLINIC | Age: 59
End: 2024-04-30

## 2024-05-17 DIAGNOSIS — K21.9 GASTROESOPHAGEAL REFLUX DISEASE, UNSPECIFIED WHETHER ESOPHAGITIS PRESENT: Primary | ICD-10-CM

## 2024-05-17 RX ORDER — OMEPRAZOLE 40 MG/1
CAPSULE, DELAYED RELEASE ORAL
Qty: 90 CAPSULE | Refills: 0 | Status: SHIPPED | OUTPATIENT
Start: 2024-05-17

## 2024-05-19 RX ORDER — OMEPRAZOLE 40 MG/1
CAPSULE, DELAYED RELEASE ORAL
Qty: 90 CAPSULE | Refills: 0 | OUTPATIENT
Start: 2024-05-19

## 2024-08-15 DIAGNOSIS — K21.9 GASTROESOPHAGEAL REFLUX DISEASE, UNSPECIFIED WHETHER ESOPHAGITIS PRESENT: ICD-10-CM

## 2024-08-15 RX ORDER — OMEPRAZOLE 40 MG/1
CAPSULE, DELAYED RELEASE ORAL
Qty: 90 CAPSULE | Refills: 0 | Status: SHIPPED | OUTPATIENT
Start: 2024-08-15

## 2024-11-26 ENCOUNTER — OFFICE VISIT (OUTPATIENT)
Dept: PRIMARY CARE CLINIC | Facility: CLINIC | Age: 59
End: 2024-11-26
Payer: COMMERCIAL

## 2024-11-26 VITALS
SYSTOLIC BLOOD PRESSURE: 119 MMHG | OXYGEN SATURATION: 100 % | RESPIRATION RATE: 18 BRPM | WEIGHT: 245 LBS | HEIGHT: 67 IN | BODY MASS INDEX: 38.45 KG/M2 | TEMPERATURE: 97.4 F | HEART RATE: 100 BPM | DIASTOLIC BLOOD PRESSURE: 78 MMHG

## 2024-11-26 DIAGNOSIS — R53.83 OTHER FATIGUE: ICD-10-CM

## 2024-11-26 DIAGNOSIS — Z12.31 ENCOUNTER FOR SCREENING MAMMOGRAM FOR MALIGNANT NEOPLASM OF BREAST: ICD-10-CM

## 2024-11-26 DIAGNOSIS — E53.8 B12 DEFICIENCY: ICD-10-CM

## 2024-11-26 DIAGNOSIS — E03.9 ACQUIRED HYPOTHYROIDISM: Primary | ICD-10-CM

## 2024-11-26 DIAGNOSIS — Z23 NEED FOR HEPATITIS B BOOSTER VACCINATION: ICD-10-CM

## 2024-11-26 DIAGNOSIS — E78.2 MIXED HYPERLIPIDEMIA: ICD-10-CM

## 2024-11-26 DIAGNOSIS — R73.02 IGT (IMPAIRED GLUCOSE TOLERANCE): ICD-10-CM

## 2024-11-26 DIAGNOSIS — M19.90 ARTHRITIS: ICD-10-CM

## 2024-11-26 DIAGNOSIS — E55.9 VITAMIN D DEFICIENCY: ICD-10-CM

## 2024-11-26 DIAGNOSIS — L30.8 OTHER ECZEMA: ICD-10-CM

## 2024-11-26 DIAGNOSIS — F41.8 DEPRESSION WITH ANXIETY: ICD-10-CM

## 2024-11-26 DIAGNOSIS — E66.01 SEVERE OBESITY: ICD-10-CM

## 2024-11-26 DIAGNOSIS — E03.9 ACQUIRED HYPOTHYROIDISM: ICD-10-CM

## 2024-11-26 PROCEDURE — 3017F COLORECTAL CA SCREEN DOC REV: CPT | Performed by: INTERNAL MEDICINE

## 2024-11-26 PROCEDURE — G8417 CALC BMI ABV UP PARAM F/U: HCPCS | Performed by: INTERNAL MEDICINE

## 2024-11-26 PROCEDURE — G8427 DOCREV CUR MEDS BY ELIG CLIN: HCPCS | Performed by: INTERNAL MEDICINE

## 2024-11-26 PROCEDURE — 90739 HEPB VACC 2/4 DOSE ADULT IM: CPT | Performed by: INTERNAL MEDICINE

## 2024-11-26 PROCEDURE — 1036F TOBACCO NON-USER: CPT | Performed by: INTERNAL MEDICINE

## 2024-11-26 PROCEDURE — 99214 OFFICE O/P EST MOD 30 MIN: CPT | Performed by: INTERNAL MEDICINE

## 2024-11-26 PROCEDURE — G8484 FLU IMMUNIZE NO ADMIN: HCPCS | Performed by: INTERNAL MEDICINE

## 2024-11-26 PROCEDURE — 90471 IMMUNIZATION ADMIN: CPT | Performed by: INTERNAL MEDICINE

## 2024-11-26 RX ORDER — PREGABALIN 150 MG/1
150 CAPSULE ORAL 2 TIMES DAILY
COMMUNITY
Start: 2024-11-04

## 2024-11-26 RX ORDER — TRIAMCINOLONE ACETONIDE 0.25 MG/G
OINTMENT TOPICAL
Qty: 15 G | Refills: 1 | Status: SHIPPED | OUTPATIENT
Start: 2024-11-26 | End: 2024-12-03

## 2024-11-26 RX ORDER — SEMAGLUTIDE 1.7 MG/.75ML
1.7 INJECTION, SOLUTION SUBCUTANEOUS
COMMUNITY
Start: 2024-09-28 | End: 2024-11-26 | Stop reason: ALTCHOICE

## 2024-11-26 RX ORDER — SEMAGLUTIDE 2.4 MG/.75ML
2.4 INJECTION, SOLUTION SUBCUTANEOUS
COMMUNITY
Start: 2024-10-29

## 2024-11-26 ASSESSMENT — PATIENT HEALTH QUESTIONNAIRE - PHQ9
SUM OF ALL RESPONSES TO PHQ9 QUESTIONS 1 & 2: 0
SUM OF ALL RESPONSES TO PHQ QUESTIONS 1-9: 0
SUM OF ALL RESPONSES TO PHQ QUESTIONS 1-9: 0
1. LITTLE INTEREST OR PLEASURE IN DOING THINGS: NOT AT ALL
SUM OF ALL RESPONSES TO PHQ QUESTIONS 1-9: 0
SUM OF ALL RESPONSES TO PHQ QUESTIONS 1-9: 0
2. FEELING DOWN, DEPRESSED OR HOPELESS: NOT AT ALL

## 2024-11-26 ASSESSMENT — ENCOUNTER SYMPTOMS
ABDOMINAL PAIN: 0
DIARRHEA: 0
SORE THROAT: 0
NAUSEA: 1
CHEST TIGHTNESS: 0
EYE DISCHARGE: 0
RHINORRHEA: 0
SHORTNESS OF BREATH: 0
COUGH: 0
COLOR CHANGE: 0
BACK PAIN: 0
CONSTIPATION: 0

## 2024-11-26 NOTE — PROGRESS NOTES
Patient provided with most updated VIS prior to administration. Opportunity given for questions and concerns provided. No concerns at this time    Immunizations administered to patient 11/26/2024 by Taylor Arellano LPN with consent.Patient tolerated procedure well. No reactions noted.  
\"Have you been to the ER, urgent care clinic since your last visit?  Hospitalized since your last visit?\"    NO    “Have you seen or consulted any other health care providers outside our system since your last visit?”    NO      Have you had a mammogram?”   Date of last Mammogram: 7/23/2019        “Have you had a pap smear?”    Doesn't know.       “Have you had a colorectal cancer screening such as a colonoscopy/FIT/Cologuard?    No colonoscopy on file  No cologuard on file  Date of last FIT: 7/22/2022   No flexible sigmoidoscopy on file       Chief Complaint   Patient presents with    Rash     All over. Itchy. Did go to [place] but did not get any answers.     Check-Up       Pt is ok with scribe.   
measure Hgb A1C levels. Waiting for results.     12. Other eczema  -     triamcinolone (KENALOG) 0.025 % ointment; Apply topically 2 times daily., Disp-15 g, R-1, Normal. sent to pharmacy.  I prescribed Kenalog 0.025% ointment to be used on her rash BID. I advised patient to monitor symptoms for improvement.          Subjective   SUBJECTIVE/OBJECTIVE:  Rash  Associated symptoms include a fever. Pertinent negatives include no congestion, cough, diarrhea, fatigue, rhinorrhea, shortness of breath or sore throat.     Patient presents today for an office visit due to a rash. She is fasting for labs.     She has been taking Wegovy 2.4 mg/ 0.75 mL. She has lost around 20 pounds. She does note that she is experiencing severe nausea with the medication. She says she has been eating healthier and less calories.     She complains of severe fatigue. Sh says she feels like she's sick.    BP is well-controlled in the office today at 119/78. She no longer takes Ziac.     She takes Adderall 30 mg daily.    She takes Lexapro 20 mg daily. She is followed by psychiatry and sees a therapist weekly.     She takes Synthroid 125 mcg daily.     She take omeprazole 40 mg daliy.     She takes Lyrica 150 mg daily.       Patient Active Problem List   Diagnosis    Depression with anxiety    Osteochondritis dissecans of left knee    H/O knee surgery    Hypothyroidism    ADD (attention deficit disorder)    Hyperlipidemia    Functional diarrhea    Osteoarthritis    Insomnia    History of cholecystectomy    Chronic hepatitis C (HCC)        Current Outpatient Medications on File Prior to Visit   Medication Sig Dispense Refill    WEGOVY 1.7 MG/0.75ML SOAJ SC injection Inject 1.7 mg into the skin every 7 days      pregabalin (LYRICA) 150 MG capsule Take 1 capsule by mouth 2 times daily.      WEGOVY 2.4 MG/0.75ML SOAJ SC injection Inject 2.4 mg into the skin every 7 days      omeprazole (PRILOSEC) 40 MG delayed release capsule TAKE 1 CAPSULE BY MOUTH

## 2024-11-27 LAB
25(OH)D3 SERPL-MCNC: 22.5 NG/ML (ref 30–100)
ALBUMIN SERPL-MCNC: 4 G/DL (ref 3.5–5)
ALBUMIN/GLOB SERPL: 1.2 (ref 1.1–2.2)
ALP SERPL-CCNC: 89 U/L (ref 45–117)
ALT SERPL-CCNC: 27 U/L (ref 12–78)
ANION GAP SERPL CALC-SCNC: 5 MMOL/L (ref 2–12)
AST SERPL-CCNC: 25 U/L (ref 15–37)
BILIRUB SERPL-MCNC: 0.6 MG/DL (ref 0.2–1)
BUN SERPL-MCNC: 18 MG/DL (ref 6–20)
BUN/CREAT SERPL: 28 (ref 12–20)
CALCIUM SERPL-MCNC: 9.5 MG/DL (ref 8.5–10.1)
CHLORIDE SERPL-SCNC: 104 MMOL/L (ref 97–108)
CHOLEST SERPL-MCNC: 188 MG/DL
CO2 SERPL-SCNC: 29 MMOL/L (ref 21–32)
CREAT SERPL-MCNC: 0.64 MG/DL (ref 0.55–1.02)
ERYTHROCYTE [DISTWIDTH] IN BLOOD BY AUTOMATED COUNT: 12.5 % (ref 11.5–14.5)
EST. AVERAGE GLUCOSE BLD GHB EST-MCNC: 97 MG/DL
GLOBULIN SER CALC-MCNC: 3.3 G/DL (ref 2–4)
GLUCOSE SERPL-MCNC: 103 MG/DL (ref 65–100)
HBA1C MFR BLD: 5 % (ref 4–5.6)
HCT VFR BLD AUTO: 41.1 % (ref 35–47)
HDLC SERPL-MCNC: 50 MG/DL
HDLC SERPL: 3.8 (ref 0–5)
HGB BLD-MCNC: 14 G/DL (ref 11.5–16)
LDLC SERPL CALC-MCNC: 111.4 MG/DL (ref 0–100)
MCH RBC QN AUTO: 30.7 PG (ref 26–34)
MCHC RBC AUTO-ENTMCNC: 34.1 G/DL (ref 30–36.5)
MCV RBC AUTO: 90.1 FL (ref 80–99)
NRBC # BLD: 0 K/UL (ref 0–0.01)
NRBC BLD-RTO: 0 PER 100 WBC
PLATELET # BLD AUTO: 225 K/UL (ref 150–400)
PMV BLD AUTO: 9.8 FL (ref 8.9–12.9)
POTASSIUM SERPL-SCNC: 3.9 MMOL/L (ref 3.5–5.1)
PROT SERPL-MCNC: 7.3 G/DL (ref 6.4–8.2)
RBC # BLD AUTO: 4.56 M/UL (ref 3.8–5.2)
SODIUM SERPL-SCNC: 138 MMOL/L (ref 136–145)
T4 FREE SERPL-MCNC: 0.9 NG/DL (ref 0.8–1.5)
TRIGL SERPL-MCNC: 133 MG/DL
TSH SERPL DL<=0.05 MIU/L-ACNC: 9.03 UIU/ML (ref 0.36–3.74)
VIT B12 SERPL-MCNC: 805 PG/ML (ref 193–986)
VLDLC SERPL CALC-MCNC: 26.6 MG/DL
WBC # BLD AUTO: 7.6 K/UL (ref 3.6–11)

## 2024-12-01 DIAGNOSIS — E03.9 ACQUIRED HYPOTHYROIDISM: Primary | ICD-10-CM

## 2024-12-01 RX ORDER — LEVOTHYROXINE SODIUM 150 UG/1
150 TABLET ORAL DAILY
Qty: 90 TABLET | Refills: 0 | Status: SHIPPED | OUTPATIENT
Start: 2024-12-01

## 2024-12-05 DIAGNOSIS — K21.9 GASTROESOPHAGEAL REFLUX DISEASE, UNSPECIFIED WHETHER ESOPHAGITIS PRESENT: ICD-10-CM

## 2024-12-06 RX ORDER — OMEPRAZOLE 40 MG/1
40 CAPSULE, DELAYED RELEASE ORAL DAILY
Qty: 90 CAPSULE | Refills: 0 | Status: SHIPPED | OUTPATIENT
Start: 2024-12-06 | End: 2025-03-06

## 2024-12-06 NOTE — TELEPHONE ENCOUNTER
Requested Prescriptions     Pending Prescriptions Disp Refills    omeprazole (PRILOSEC) 40 MG delayed release capsule [Pharmacy Med Name: OMEPRAZOLE 40MG CAPSULES] 90 capsule 0     Sig: Take 1 capsule by mouth daily        Last Visit 11/26/24  Last Refill 8/15/24

## 2025-02-28 DIAGNOSIS — K21.9 GASTROESOPHAGEAL REFLUX DISEASE, UNSPECIFIED WHETHER ESOPHAGITIS PRESENT: ICD-10-CM

## 2025-02-28 RX ORDER — OMEPRAZOLE 40 MG/1
40 CAPSULE, DELAYED RELEASE ORAL DAILY
Qty: 90 CAPSULE | Refills: 0 | Status: SHIPPED | OUTPATIENT
Start: 2025-02-28 | End: 2025-05-29

## 2025-03-31 DIAGNOSIS — E03.9 ACQUIRED HYPOTHYROIDISM: ICD-10-CM

## 2025-03-31 RX ORDER — LEVOTHYROXINE SODIUM 150 UG/1
150 TABLET ORAL DAILY
Qty: 90 TABLET | Refills: 0 | Status: SHIPPED | OUTPATIENT
Start: 2025-03-31

## 2025-04-02 DIAGNOSIS — L30.8 OTHER ECZEMA: Primary | ICD-10-CM

## 2025-04-02 RX ORDER — TRIAMCINOLONE ACETONIDE 0.25 MG/G
OINTMENT TOPICAL
Qty: 15 G | Refills: 1 | Status: SHIPPED | OUTPATIENT
Start: 2025-04-02 | End: 2025-04-09

## 2025-05-29 DIAGNOSIS — K21.9 GASTROESOPHAGEAL REFLUX DISEASE, UNSPECIFIED WHETHER ESOPHAGITIS PRESENT: ICD-10-CM

## 2025-05-29 RX ORDER — OMEPRAZOLE 40 MG/1
40 CAPSULE, DELAYED RELEASE ORAL DAILY
Qty: 90 CAPSULE | Refills: 0 | Status: SHIPPED | OUTPATIENT
Start: 2025-05-29 | End: 2025-08-27

## 2025-06-27 ENCOUNTER — OFFICE VISIT (OUTPATIENT)
Dept: PRIMARY CARE CLINIC | Facility: CLINIC | Age: 60
End: 2025-06-27
Payer: COMMERCIAL

## 2025-06-27 ENCOUNTER — TELEPHONE (OUTPATIENT)
Dept: PRIMARY CARE CLINIC | Facility: CLINIC | Age: 60
End: 2025-06-27

## 2025-06-27 VITALS
BODY MASS INDEX: 35.69 KG/M2 | DIASTOLIC BLOOD PRESSURE: 73 MMHG | TEMPERATURE: 97.3 F | WEIGHT: 227.4 LBS | RESPIRATION RATE: 16 BRPM | HEART RATE: 85 BPM | SYSTOLIC BLOOD PRESSURE: 107 MMHG | HEIGHT: 67 IN | OXYGEN SATURATION: 93 %

## 2025-06-27 DIAGNOSIS — N30.00 ACUTE CYSTITIS WITHOUT HEMATURIA: ICD-10-CM

## 2025-06-27 DIAGNOSIS — R30.0 DYSURIA: Primary | ICD-10-CM

## 2025-06-27 LAB
BILIRUBIN, URINE, POC: ABNORMAL
BLOOD URINE, POC: NEGATIVE
GLUCOSE URINE, POC: 500
KETONES, URINE, POC: ABNORMAL
LEUKOCYTE ESTERASE, URINE, POC: ABNORMAL
NITRITE, URINE, POC: POSITIVE
PH, URINE, POC: 5.5 (ref 4.6–8)
PROTEIN,URINE, POC: 300
SPECIFIC GRAVITY, URINE, POC: 1 (ref 1–1.03)
URINALYSIS CLARITY, POC: ABNORMAL
URINALYSIS COLOR, POC: ABNORMAL
UROBILINOGEN, POC: ABNORMAL MG/DL

## 2025-06-27 PROCEDURE — 81001 URINALYSIS AUTO W/SCOPE: CPT

## 2025-06-27 PROCEDURE — G8427 DOCREV CUR MEDS BY ELIG CLIN: HCPCS

## 2025-06-27 PROCEDURE — G8417 CALC BMI ABV UP PARAM F/U: HCPCS

## 2025-06-27 PROCEDURE — 1036F TOBACCO NON-USER: CPT

## 2025-06-27 PROCEDURE — 99213 OFFICE O/P EST LOW 20 MIN: CPT

## 2025-06-27 PROCEDURE — 3017F COLORECTAL CA SCREEN DOC REV: CPT

## 2025-06-27 RX ORDER — SULFAMETHOXAZOLE AND TRIMETHOPRIM 800; 160 MG/1; MG/1
1 TABLET ORAL 2 TIMES DAILY
Qty: 10 TABLET | Refills: 0 | Status: SHIPPED | OUTPATIENT
Start: 2025-06-27 | End: 2025-07-02

## 2025-06-27 SDOH — ECONOMIC STABILITY: FOOD INSECURITY: WITHIN THE PAST 12 MONTHS, YOU WORRIED THAT YOUR FOOD WOULD RUN OUT BEFORE YOU GOT MONEY TO BUY MORE.: NEVER TRUE

## 2025-06-27 SDOH — ECONOMIC STABILITY: FOOD INSECURITY: WITHIN THE PAST 12 MONTHS, THE FOOD YOU BOUGHT JUST DIDN'T LAST AND YOU DIDN'T HAVE MONEY TO GET MORE.: NEVER TRUE

## 2025-06-27 ASSESSMENT — ENCOUNTER SYMPTOMS
NAUSEA: 0
COUGH: 0
ABDOMINAL PAIN: 0
BACK PAIN: 1
DIARRHEA: 0
VOMITING: 0
WHEEZING: 0
SHORTNESS OF BREATH: 0
BLOOD IN STOOL: 0
CONSTIPATION: 0

## 2025-06-27 ASSESSMENT — PATIENT HEALTH QUESTIONNAIRE - PHQ9
8. MOVING OR SPEAKING SO SLOWLY THAT OTHER PEOPLE COULD HAVE NOTICED. OR THE OPPOSITE, BEING SO FIGETY OR RESTLESS THAT YOU HAVE BEEN MOVING AROUND A LOT MORE THAN USUAL: NOT AT ALL
SUM OF ALL RESPONSES TO PHQ QUESTIONS 1-9: 3
7. TROUBLE CONCENTRATING ON THINGS, SUCH AS READING THE NEWSPAPER OR WATCHING TELEVISION: NOT AT ALL
4. FEELING TIRED OR HAVING LITTLE ENERGY: NOT AT ALL
SUM OF ALL RESPONSES TO PHQ QUESTIONS 1-9: 3
6. FEELING BAD ABOUT YOURSELF - OR THAT YOU ARE A FAILURE OR HAVE LET YOURSELF OR YOUR FAMILY DOWN: NOT AT ALL
2. FEELING DOWN, DEPRESSED OR HOPELESS: NEARLY EVERY DAY
5. POOR APPETITE OR OVEREATING: NOT AT ALL
SUM OF ALL RESPONSES TO PHQ QUESTIONS 1-9: 3
SUM OF ALL RESPONSES TO PHQ QUESTIONS 1-9: 3
9. THOUGHTS THAT YOU WOULD BE BETTER OFF DEAD, OR OF HURTING YOURSELF: NOT AT ALL
10. IF YOU CHECKED OFF ANY PROBLEMS, HOW DIFFICULT HAVE THESE PROBLEMS MADE IT FOR YOU TO DO YOUR WORK, TAKE CARE OF THINGS AT HOME, OR GET ALONG WITH OTHER PEOPLE: NOT DIFFICULT AT ALL
1. LITTLE INTEREST OR PLEASURE IN DOING THINGS: NOT AT ALL
3. TROUBLE FALLING OR STAYING ASLEEP: NOT AT ALL

## 2025-06-27 NOTE — PROGRESS NOTES
Have you been to the ER, urgent care clinic since your last visit?  Hospitalized since your last visit?   no    Have you seen or consulted any other health care providers outside our system since your last visit?   Psychiatry   Therapy    Have you had a mammogram?”   Needed     Date of last Mammogram: 7/23/2019      “Have you had a pap smear?”    needed    No cervical cancer screening on file       “Have you had a colorectal cancer screening such as a colonoscopy/FIT/Cologuard?    no    No colonoscopy on file  No cologuard on file  Date of last FIT: 7/22/2022   No flexible sigmoidoscopy on file

## 2025-06-27 NOTE — TELEPHONE ENCOUNTER
Patient is requesting to speak to a nurse because she is experiencing UTI symptoms and asked may Dr. Tamez send her some antibiotics

## 2025-06-27 NOTE — PROGRESS NOTES
[facility]   [address], [address]  [place] [address]  P: [phone]  F: [phone]    SUBJECTIVE     HPI:     Celsa Gandhi is a 60 y.o. female who is seen in the clinic for   Chief Complaint   Patient presents with    Back Pain    Abdominal Pain    Urinary Pain        The patient presents to the office today c/o of dysuria, back pain    Notes frequency over the past few days and some urinary leakage. Yesterday she developed lower back pain. Then had a sensation of fullness in her lower abdomen. Urine has been cloudy, sometimes orange (without azo). Took Azo last night, followed by significant dysuria. Last night had a temperature of 103F. Took Tylenol last night and temperature returned to normal. She took a home UTI test which was positive.     Patient Active Problem List   Diagnosis    Depression with anxiety    Osteochondritis dissecans of left knee    H/O knee surgery    Hypothyroidism    ADD (attention deficit disorder)    Hyperlipidemia    Functional diarrhea    Osteoarthritis    Insomnia    History of cholecystectomy    Chronic hepatitis C (HCC)        Past Medical History:   Diagnosis Date    Arthritis     Colitis     Colitis, ulcerative (HCC)     Family history of skin cancer     Functional diarrhea 6/16/2021    GERD (gastroesophageal reflux disease)     Ill-defined condition     Hep C    Ill-defined condition     ADHD    Liver disease 2004    HEPATITIS C- NEVER TREATED    Nausea & vomiting     Psychiatric disorder     ADD    Skin cancer     BCC    Symptomatic cholelithiasis 7/27/2020    Thyroid disease     Ulcerative colitis (HCC)      Current Outpatient Medications   Medication Sig Dispense Refill    sulfamethoxazole-trimethoprim (BACTRIM DS;SEPTRA DS) 800-160 MG per tablet Take 1 tablet by mouth 2 times daily for 5 days 10 tablet 0    omeprazole (PRILOSEC) 40 MG delayed release capsule TAKE 1 CAPSULE BY MOUTH DAILY 90 capsule 0    levothyroxine (SYNTHROID) 150 MCG tablet

## 2025-06-28 DIAGNOSIS — N30.00 ACUTE CYSTITIS WITHOUT HEMATURIA: ICD-10-CM

## 2025-06-30 ENCOUNTER — PATIENT MESSAGE (OUTPATIENT)
Dept: PRIMARY CARE CLINIC | Facility: CLINIC | Age: 60
End: 2025-06-30

## 2025-06-30 ENCOUNTER — RESULTS FOLLOW-UP (OUTPATIENT)
Dept: PRIMARY CARE CLINIC | Facility: CLINIC | Age: 60
End: 2025-06-30

## 2025-06-30 LAB
BACTERIA SPEC CULT: ABNORMAL
CC UR VC: ABNORMAL
SERVICE CMNT-IMP: ABNORMAL

## 2025-09-05 DIAGNOSIS — K21.9 GASTROESOPHAGEAL REFLUX DISEASE, UNSPECIFIED WHETHER ESOPHAGITIS PRESENT: ICD-10-CM

## 2025-09-05 RX ORDER — OMEPRAZOLE 40 MG/1
40 CAPSULE, DELAYED RELEASE ORAL DAILY
Qty: 90 CAPSULE | Refills: 0 | Status: SHIPPED | OUTPATIENT
Start: 2025-09-05 | End: 2025-12-04

## (undated) DEVICE — DRAPE,UTILTY,TAPE,15X26, 4EA/PK: Brand: MEDLINE

## (undated) DEVICE — SUTURE MCRYL SZ 4-0 L27IN ABSRB UD L19MM PS-2 1/2 CIR PRIM Y426H

## (undated) DEVICE — SURGICAL PROCEDURE KIT GEN LAPAROSCOPY LF

## (undated) DEVICE — DERMABOND SKIN ADH 0.7ML -- DERMABOND ADVANCED 12/BX

## (undated) DEVICE — BAG SPEC REM 224ML W4XL6IN DIA10MM 1 HND GYN DISP ENDOPCH

## (undated) DEVICE — TROCAR: Brand: KII® OPTICAL ACCESS SYSTEM

## (undated) DEVICE — CLICKLINE SCISSORS INSERT: Brand: CLICKLINE

## (undated) DEVICE — GARMENT,MEDLINE,DVT,INT,CALF,MED, GEN2: Brand: MEDLINE

## (undated) DEVICE — 4-PORT MANIFOLD: Brand: NEPTUNE 2

## (undated) DEVICE — PREP SKN CHLRAPRP APL 26ML STR --

## (undated) DEVICE — STERILE POLYISOPRENE POWDER-FREE SURGICAL GLOVES WITH EMOLLIENT COATING: Brand: PROTEXIS

## (undated) DEVICE — APPLIER CLP L SHFT DIA12MM 20 ROT MULT LIGACLP

## (undated) DEVICE — FILTER SMK EVAC FLO CLR MEGADYNE

## (undated) DEVICE — SOLUTION IRRIGATION NACL 0.9% 1000 ML FLX CONTAINER

## (undated) DEVICE — ELECTRODE ES 36CM LAP FLAT L HK COAT DISP CLEANCOAT

## (undated) DEVICE — REM POLYHESIVE ADULT PATIENT RETURN ELECTRODE: Brand: VALLEYLAB

## (undated) DEVICE — Device

## (undated) DEVICE — TROCAR: Brand: KII® SLEEVE

## (undated) DEVICE — SYR 20ML LL STRL LF --

## (undated) DEVICE — TROCAR SITE CLOSURE DEVICE: Brand: ENDO CLOSE

## (undated) DEVICE — LAPAROSCOPIC TROCAR SLEEVE/SINGLE USE: Brand: KII® OPTICAL ACCESS SYSTEM

## (undated) DEVICE — INFECTION CONTROL KIT SYS

## (undated) DEVICE — DISSECTOR RMFG CURVED 5MM --

## (undated) DEVICE — STRAP,POSITIONING,KNEE/BODY,FOAM,4X60": Brand: MEDLINE

## (undated) DEVICE — SUTURE SZ 0 27IN 5/8 CIR UR-6  TAPER PT VIOLET ABSRB VICRYL J603H

## (undated) DEVICE — NEEDLE HYPO 25GA L1.5IN BVL ORIENTED ECLIPSE